# Patient Record
Sex: MALE | Race: BLACK OR AFRICAN AMERICAN | Employment: OTHER | URBAN - METROPOLITAN AREA
[De-identification: names, ages, dates, MRNs, and addresses within clinical notes are randomized per-mention and may not be internally consistent; named-entity substitution may affect disease eponyms.]

---

## 2017-05-11 ENCOUNTER — HOSPITAL ENCOUNTER (OUTPATIENT)
Age: 67
Discharge: HOME | End: 2017-05-11
Attending: INTERNAL MEDICINE
Payer: OTHER GOVERNMENT

## 2017-05-11 DIAGNOSIS — M25.562 PAIN IN LEFT KNEE: ICD-10-CM

## 2017-05-11 PROCEDURE — 73721 MRI JNT OF LWR EXTRE W/O DYE: CPT

## 2017-06-05 ENCOUNTER — HOSPITAL ENCOUNTER (OUTPATIENT)
Dept: PHYSICAL THERAPY | Age: 67
Discharge: HOME | End: 2017-06-05
Payer: OTHER GOVERNMENT

## 2017-06-05 PROCEDURE — 97163 PT EVAL HIGH COMPLEX 45 MIN: CPT

## 2017-06-05 PROCEDURE — 97530 THERAPEUTIC ACTIVITIES: CPT

## 2017-06-05 NOTE — PROGRESS NOTES
PT DAILY TREATMENT NOTE/LUMBAR EVAL 3-16    Patient Name: Angelique Salazar  Date:2017  : 1950  [x]  Patient  Verified  Payor:  / Plan: [facility]  RETIREES AND DEPENDENTS / Product Type:  /    In time:435  Out time:530  Total Treatment Time (min): 55  Total Timed Codes (min): 55  1:1 Treatment Time ( [address] only): 54   Visit #: 1 of 12    Treatment Area: Spondylolisthesis, lumbar region [M43.16]  SUBJECTIVE  Pain Level (0-10 scale): 7  [x]constant []intermittent []improving []worsening []no change since onset    Any medication changes, allergies to medications, adverse drug reactions, diagnosis change, or new procedure performed?: [x] No    [] Yes (see summary sheet for update)  Subjective functional status/changes: iChronic LB and LLE pain > 6 months, almost constant sharp pain going into left LE prema with walking and standing. Less symptoms in sitting position with minimal LE pain. Also reporting intermittent numbness/tingling from knee down to foot. Positive for nocturnal pain prema in left LE. Has been on Gabapentin since CS fusion 2010. Symptoms in LE are associated with subjective weakness around knee and lower leg. Reports dragging of right foot for years for > 5 years. Hx of fall in the yard in 2010 causing right hip contusion and onset of right LE dragging. Recent MRI of left knee shows small meniscal tear and arthritic changes. PLOF: Computer work.  Full function with occasional LBP, no sharp left LE pain  Limitations to PLOF: limited tolerance to ADL prema cutting grass, prolonged standing/walking  Mechanism of Injury: unsure about cause of injury  Current symptoms/Complaints: LB, left LE pain/weakness/paresthesia  Previous Treatment/Compliance: good, 2013 for LBP  PMHx/Surgical Hx: CS fusion   Work Hx: computer work  Living Situation: 2 level home, bedroom downstairs  Pt Goals: Get rid of the pain  Barriers: []pain []financial []time []transportation []other  Motivation: good  Substance use: []Alcohol []Tobacco []other:   FABQ Score: []low []elevate  Cognition: A & O x 3    Other:    OBJECTIVE/EXAMINATION  Domestic Life: WNL  Activity/Recreational Limitations: unable to do, played golf until 2010  Mobility: limited  Self Care:  WNL        Modality rationale: Pain control   Min Type Additional Details    [] Estim:  []Unatt       []IFC  []Premod                        []Other:  []w/ice   []w/heat  Position:  Location:    [] Estim: []Att    []TENS instruct  []NMES                    []Other:  []w/US   []w/ice   []w/heat  Position:  Location:    []  Traction: [] Cervical       []Lumbar                       [] Prone          []Supine                       []Intermittent   []Continuous Lbs:  [] before manual  [] after manual    []  Ultrasound: []Continuous   [] Pulsed                           []1MHz   []3MHz Location:  W/cm2:    []  Iontophoresis with dexamethasone         Location: [] Take home patch   [] In clinic    []  Ice     []  heat  []  Ice massage  []  Laser   []  Anodyne Position:  Location:    []  Laser with stim  []  Other: Position:  Location:    []  Vasopneumatic Device Pressure:       [] lo [] med [] hi   Temperature: [] lo [] med [] hi   [] Skin assessment post-treatment:  []intact []redness- no adverse reaction    []redness - adverse reaction:     15 min [x]Eval                  []Re-Eval           40 min Therapeutic Activity:  [x]  See flow sheet :instruction in HEP, POC, postural alignment activities, stretching and initiation of TERRELL for ES inhibition   Rationale: improved postural alignment                   With   [x] TE   [x] TA   [] neuro   [] other: Patient Education: [x] Review HEP    [] Progressed/Changed HEP based on:   [] positioning   [] body mechanics   [] transfers   [] heat/ice application    [] other:      Other Objective/Functional Measures: as per evaluation    Physical Therapy Evaluation - Lumbar Spine ([facility])    SUBJECTIVE  Chief Complaint:LB and left LE pain    Mechanism of injury:unsure, previous fall in yard in 2010    Symptoms:  Aggravated by:   [] Bending [] Sitting [x] Standing [x] Walking   [] Moving [] Cough [] Sneeze [] Valsalva   [x] AM  [] PM  Lying:  [x] sup   [] pro   [] sidelying   [] Other:stiffness     Eased by:    [] Bending [] Sitting [] Standing [] Walking   [] Moving [] AM  [] PM  Lying: [] sup  [] pro  [x] sidelying   [] Other:using stretching strap     General Health:  Red Flags Indicated? [] Yes    [x] No  [] Yes [] No Recent weight change (If yes, due to dieting?  [] Yes  [] No)   [] Yes [] No Weakness in legs during walking  [] Yes [] No Unremitting pain at night  [] Yes [] No Abdominal pain or problems  [] Yes [] No Rectal bleeding  [] Yes [] No Feet more cold or painful in cold weather  [] Yes [] No Menstrual irregularities  [] Yes [] No Blood or pain with urination  [] Yes [] No Dysfunction of bowel or bladder  [] Yes [] No Recent illness within past 3 weeks (i.e, cold, flu)  [] Yes [] No Numbness/tingling in buttock/genitalia region    Past History/Treatments:     Diagnostic Tests: [] Lab work [] X-rays    [] CT [x] MRI     [] Other:  Results:neg LB, pos left knee     Functional Status  Prior level of function:full function with intermittent LBP  Present functional limitations:limited standing/walking tolerance  What position do you sleep in?:SL     OBJECTIVE  Posture:scoliosis, right lower posterior rib cage prominent  Lateral Shift: [] R    [x] L     [x] +  [] -  Kyphosis: [] Increased [x] Decreased   []  WNL  Lordosis:  [] Increased [x] Decreased   [] WNL  Pelvic symmetry: [] WNL    [x] Other:marked asymmetries throughout trunk, spine, right pelvis elevated    Gait:  [] Normal     [] Abnormal:    Active Movements: [] N/A   [] Too acute   [] Other:  ROM % AROM % PROM Comments:pain, area   Forward flexion 40-60 8\"     Extension 20-30 limited  pain   SB right 20-30 19\"  pain   SB left 20-30 18\"  pain   Rotation right 5-10 Rotation left 5-10        Repeated Movements   Effects on present pain: produces (KY), abolishes (A), increases (incr), decreases (decr), centralizes (C), peripheral (PH), no effect (NE)   Pre-Test Sx Flexion Repeated Flexion Extension Repeated Extension Repeated SBL Repeated SBR   Sitting          Standing          Lying      N/A N/A   Comments:  Side Glide:  Sustained passive positioning test:    Neuro Screen [x] WNL  Myotome/Dermatome/Reflexes:  Comments:    Dural Mobility:  SLR Sitting: [x] R    [x] L    [] +    [x] -  @ (degrees):           Supine: [x] R    [x] L    [] +    [x] -  @ (degrees): limited SLR left 40, right 30 due to immense tightness  Slump Test: [] R    [] L    [] +    [] -  @ (degrees):   Prone Knee Bend: [x] R    [] L    [x] +    [] - , left 90, right 60  Palpation  [] Min  [x] Mod  [] Severe    Location:5/S1L  [] Min  [] Mod  [] Severe    Location:  [] Min  [] Mod  [] Severe    Location:    Strength   L(0-5) R (0-5) N/T   Hip Flexion (L1,2)   []   Knee Extension (L3,4)   []   Ankle Dorsiflexion (L4)   []   Great Toe Extension (L5)   []   Ankle Plantarflexion (S1)   []   Knee Flexion (S1,2)   []   Upper Abdominals   []   Lower Abdominals   []   Paraspinals   []   Back Rotators   []   Gluteus Robbi 3 2 []   Other   []     Special Tests  Lumbar:  Lumb.  Compression: [] Pos  [] Neg               Lumbar Distraction:   [] Pos  [] Neg    Quadrant:  [] Pos  [] Neg   [] Flex  [] Ext    Sacroilliac:  Gaenslen's: [] R    [] L    [] +    [] -     Compression: [] +    [] -     Gapping:  [] +    [] -     Thigh Thrust: [] R    [] L    [] +    [] -     Leg Length: [] +    [] -   Position:    Crests:    ASIS:    PSIS:    Sacral Sulcus:    Mobility: Standing flex:     Sitting flex:     Supine to sit:     Prone knee bend:         Hip: Huel Jen:  [] R    [] L    [] +    [] -     Scour:  [] R    [] L    [] +    [] -     Piriformis: [x] R    [x] L    [x] +    [] -          Deficits: Edis's: [x] R    [x] L    [x] + [] -     [place]: [x] R    [x] L    [x] +    [] -     Hamstrings 90/90: right 40, left 50    Gastrocsoleus (to neutral): Right: Left:       Global Muscular Weakness:  Abdominals:4/5  Quadratus Lumborum:nt  Paraspinals:hypertonicity  Other:    Other tests/comments:       Pain Level (0-10 scale) post treatment: 0/10 in 90/90 position, 2/10 stance    ASSESSMENT/Changes in Function: relaxation in proper position    Patient will continue to benefit from skilled PT services to address ROM deficits, address strength deficits, analyze and address soft tissue restrictions, analyze and cue movement patterns, analyze and modify body mechanics/ergonomics and assess and modify postural abnormalities to attain remaining goals.      [x]  See Plan of Care  []  See progress note/recertification  []  See Discharge Summary         Progress towards goals / Updated goals:  Good understanding of initial HEP    PLAN  []  Upgrade activities as tolerated     [x]  Continue plan of care  []  Update interventions per flow sheet       []  Discharge due to:_  []  Other:_      Jordan Santiago PT 6/5/2017  4:25 PM

## 2017-06-05 NOTE — PROGRESS NOTES
[facility] in [facility]. Courtney Sanchez, [address] [place]  Phone: [phone]      Fax:  [phone] of Care/ Statement of Necessity for Physical Therapy Services    Patient name: Rosalind Woodall Start of Care: 2017   Referral source: Cary Clemens : 1950    Medical Diagnosis: Spondylolisthesis, lumbar region [M43.16]   Onset Date:chronic with recent exacerbation    Treatment Diagnosis: LBP, scoliosis, postural alignment deficit   Prior Hospitalization: see medical history Provider#: [id number]   Medications: Verified on Patient summary List    Comorbidities: CS fusion, right hip dysfunction   Prior Level of Function: Full function with intermittent pain      The Plan of Care and following information is based on the information from the initial evaluation. Assessment/ key information: 61 YOM with reports of recent progressive LB and  LE pain affecting his walking ability with pain ranging from 5-10/10 depending on activity. Objective findings include marked deficit in right hip, left shoulder and spinal mobility, flexibility deficits prema in hamstring musculature,spinal scoliosis and alignment deficits including elevated right pelvis/LLD right >left, muscle imbalances and strength deficit in proximal hip prema on right, altered gait pattern with increased LLE WB and slight dragging of right LE due to hip hypomobility. Patient is highly motivated and a good candidate for skilled PT. Evaluation Complexity History HIGH Complexity :3+ comorbidities / personal factors will impact the outcome/ POC ; Examination HIGH Complexity : 4+ Standardized tests and measures addressing body structure, function, activity limitation and / or participation in recreation  ;Presentation MEDIUM Complexity : Evolving with changing characteristics  ; Clinical Decision Making MEDIUM Complexity : FOTO score of 26-74  Overall Complexity Rating: MEDIUM  Problem List: pain affecting function, decrease ROM, decrease strength, impaired gait/ balance, decrease activity tolerance and decrease flexibility/ joint mobility   Treatment Plan may include any combination of the following: Therapeutic exercise, Therapeutic activities, Neuromuscular re-education, Physical agent/modality, Manual therapy and Patient education  Patient / Family readiness to learn indicated by: asking questions and trying to perform skills  Persons(s) to be included in education: patient (P)  Barriers to Learning/Limitations: None  Patient Goal (s): Be able to function better, less pain  Patient Self Reported Health Status: good  Rehabilitation Potential: good    Short Term Goals: To be accomplished in 3 weeks:   1. Improved postural alignment and awareness to allow self correction during ADL  Status at [facility]: postural training initiated    2. Patient reports reduction in pain to <or= 5/10 with ADL and ambulatory activities  Status at [facility]: pain ranging from 5-10/10    Long Term Goals: To be accomplished in 6 weeks:   1. Improved FOTO score to >or= 65/100 as evidence of improved function with less pain  Status at [facility]: FOTO 56/100    2. Improved proximal hip strength bilaterally to >or= 4-/5 for improved gait pattern and increased ease with dynamic ADL  Status at [facility]: Glut max MMT right 2/5, left 3/5    3. Improved SLR to >or= 60 deg bilaterally and Left =Right flexibility for improved trunk neutrality and increased ease with stooping and dynamic activities  Status at [facility]: SLR left 40, right 30 deg    4. Improved spinal mobility and ability to reach past mid shin for increased ease with all ADL  Status at [facility]: marked HS flexibility deficit and FFD 8 inches with spinal FLex in standing    5. Patient reports reduction in LB and LE pain to <or= 3/10 with ADL and community ambulation  Status at [facility]: pain ranging from 5-10/10    Frequency / Duration: Patient to be seen 2 times per week for 6 weeks.     Patient/ Caregiver education and instruction: Diagnosis, prognosis, activity modification and exercises   [x]  Plan of care has been reviewed with PTA          The severity rating is based on clinical judgment and the FOTO score. Certification Period: 6/5/17-8/4/17  Jessica Chin, PT 6/5/2017 4:24 PM  _____________________________________________________________________  I certify that the above Therapy Services are being furnished while the patient is under my care. I agree with the treatment plan and certify that this therapy is necessary. Physician's Signature:____________________  Date:__________Time:______    Please sign and return to   [facility] in [address]  [place] [address]     Phone: [phone]      Fax:  [phone]

## 2017-06-08 ENCOUNTER — HOSPITAL ENCOUNTER (OUTPATIENT)
Dept: PHYSICAL THERAPY | Age: 67
Discharge: HOME | End: 2017-06-08
Payer: OTHER GOVERNMENT

## 2017-06-08 PROCEDURE — 97110 THERAPEUTIC EXERCISES: CPT

## 2017-06-08 PROCEDURE — 97530 THERAPEUTIC ACTIVITIES: CPT

## 2017-06-08 NOTE — PROGRESS NOTES
PT DAILY TREATMENT NOTE     Patient Name: Luis Peng  Date:2017  : 1950  [x]  Patient  Verified  Payor:  / Plan: [facility]  RETIREES AND DEPENDENTS / Product Type: [employer] /    In time:355  Out time:435  Total Treatment Time (min): 40  Visit #: 2 of 12    Treatment Area: Spondylolisthesis, lumbar region [M43.16]    SUBJECTIVE  Pain Level (0-10 scale): left knee pain 6/10, LB stiffness with ambulation 6-7/10  Any medication changes, allergies to medications, adverse drug reactions, diagnosis change, or new procedure performed?: [x] No    [] Yes (see summary sheet for update)  Subjective functional status/changes:   [] No changes reported  When I am sitting down the pain is gone    OBJECTIVE    Modality rationale: Reduction of muscle guarding, pain   Min Type Additional Details    [] Estim:  []Unatt       []IFC  []Premod                        []Other:  []w/ice   []w/heat  Position:  Location:    [] Estim: []Att    []TENS instruct  []NMES                    []Other:  []w/US   []w/ice   []w/heat  Position:  Location:    []  Traction: [] Cervical       []Lumbar                       [] Prone          []Supine                       []Intermittent   []Continuous Lbs:  [] before manual  [] after manual    []  Ultrasound: []Continuous   [] Pulsed                           []1MHz   []3MHz W/cm2:  Location:    []  Iontophoresis with dexamethasone         Location: [] Take home patch   [] In clinic   10 []  Ice     [x]  heat  []  Ice massage  []  Laser   []  Anodyne Position:90/90  Location:LB    []  Laser with stim  []  Other:  Position:  Location:    []  Vasopneumatic Device Pressure:       [] lo [] med [] hi   Temperature: [] lo [] med [] hi   [] Skin assessment post-treatment:  []intact []redness- no adverse reaction    []redness - adverse reaction:      min []Eval                  []Re-Eval       25 min Therapeutic Exercise:  [x] See flow sheet :stretching, ROM   Rationale: increase ROM, increase strength, improve coordination and improve balance     15 min Therapeutic Activity:  [x]  See flow sheet :TERRELL for better postural alignment, more effective breathing pattern and trunk neutrality   Rationale: increase ROM and increase strength  to improve the patients ability to function with more neutral alignment      min Neuromuscular Re-education:  []  See flow sheet :        min Manual Therapy:                   With   [x] TE   [x] TA   [] neuro   [] other: Patient Education: [x] Review HEP    [] Progressed/Changed HEP based on:   [] positioning   [] body mechanics   [] transfers   [] heat/ice application    [] other:      Other Objective/Functional Measures:   Difficulty initially with balloon breathing  Needs frequent verbal cues for proper form with TE     Pain Level (0-10 scale) post treatment: 0 no pain during TE/TA    ASSESSMENT/Changes in Function: shallow breathing, overcompensation of CS     Patient will continue to benefit from skilled PT services to address functional mobility deficits, address ROM deficits, address strength deficits, analyze and address soft tissue restrictions, analyze and cue movement patterns and assess and modify postural abnormalities to attain remaining goals.      [x]  See Plan of Care  []  See progress note/recertification  []  See Discharge Summary         Progress towards goals / Updated goals:  Challenged with TE/TA    PLAN  []  Upgrade activities as tolerated     [x]  Continue plan of care  []  Update interventions per flow sheet       []  Discharge due to:_  []  Other:_      Raheem Sweet PT 6/8/2017  3:55 PM    Future Appointments  Date Time Provider Shilo Anderson   6/8/2017 4:00 PM GRAYSON VerasHPRHONDA THE [facility]   6/12/2017 4:30 PM Kanwal PONCEHPISHAAND THE [facility]   6/15/2017 4:30 PM Heather Gottlieb PTA MI[facility]PTD THE [facility]   6/19/2017 4:00 PM GRAYSON WaiteHPRHONDA THE [facility]   6/21/2017 4:00 PM Anette Altamease Goodpasture, PT [facility]   6/26/2017 4:00 PM GRAYSON WaiteHPRHONDA THE [facility]   6/28/2017 2:45 PM MD Jacky Aaron   6/29/2017 4:00 PM GRAYSON Bajwa THE [facility]

## 2017-06-12 ENCOUNTER — HOSPITAL ENCOUNTER (OUTPATIENT)
Dept: PHYSICAL THERAPY | Age: 67
Discharge: HOME | End: 2017-06-12
Payer: OTHER GOVERNMENT

## 2017-06-12 PROCEDURE — 97112 NEUROMUSCULAR REEDUCATION: CPT

## 2017-06-12 PROCEDURE — 97110 THERAPEUTIC EXERCISES: CPT

## 2017-06-12 PROCEDURE — 97530 THERAPEUTIC ACTIVITIES: CPT

## 2017-06-12 NOTE — PROGRESS NOTES
PT DAILY TREATMENT NOTE     Patient Name: Darrell Vilchis  Date:2017  : 1950  [x]  Patient  Verified  Payor:  / Plan: [facility]  RETIREES AND DEPENDENTS / Product Type: [employer] /    In time:425  Out time:515  Total Treatment Time (min): 50  Visit #: 3 of 12    Treatment Area: Spondylolisthesis, lumbar region [M43.16]    SUBJECTIVE  Pain Level (0-10 scale): LB 4/10  Any medication changes, allergies to medications, adverse drug reactions, diagnosis change, or new procedure performed?: [x] No    [] Yes (see summary sheet for update)  Subjective functional status/changes:   [] No changes reported  Less tightness and pain after last session    OBJECTIVE    Modality rationale: Reduction of muscle guarding, pain   Min Type Additional Details    [] Estim:  []Unatt       []IFC  []Premod                        []Other:  []w/ice   []w/heat  Position:  Location:    [] Estim: []Att    []TENS instruct  []NMES                    []Other:  []w/US   []w/ice   []w/heat  Position:  Location:    []  Traction: [] Cervical       []Lumbar                       [] Prone          []Supine                       []Intermittent   []Continuous Lbs:  [] before manual  [] after manual    []  Ultrasound: []Continuous   [] Pulsed                           []1MHz   []3MHz W/cm2:  Location:    []  Iontophoresis with dexamethasone         Location: [] Take home patch   [] In clinic   10 []  Ice     [x]  heat  []  Ice massage  []  Laser   []  Anodyne Position:90/90  Location:LB    []  Laser with stim  []  Other:  Position:  Location:    []  Vasopneumatic Device Pressure:       [] lo [] med [] hi   Temperature: [] lo [] med [] hi   [] Skin assessment post-treatment:  []intact []redness- no adverse reaction    []redness - adverse reaction:      min []Eval                  []Re-Eval       15 min Therapeutic Exercise:  [x] See flow sheet :stretching, ROM   Rationale: increase ROM, increase strength, improve coordination and improve balance     25 min Therapeutic Activity:  [x]  See flow sheet :TERRELL for better postural alignment, more effective breathing pattern and trunk neutrality, review of balloon breathing, focus on hip shift   Rationale: increase ROM and increase strength  to improve the patients ability to function with more neutral alignment      min Neuromuscular Re-education:  []  See flow sheet :        min Manual Therapy:                   With   [x] TE   [x] TA   [] neuro   [] other: Patient Education: [x] Review HEP    [] Progressed/Changed HEP based on:   [] positioning   [] body mechanics   [] transfers   [] heat/ice application    [] other:      Other Objective/Functional Measures:   Improved performance with TERRELL ex     Pain Level (0-10 scale) post treatment: 0 no pain during TE/TA    ASSESSMENT/Changes in Function: shallow breathing, overcompensation of CS     Patient will continue to benefit from skilled PT services to address functional mobility deficits, address ROM deficits, address strength deficits, analyze and address soft tissue restrictions, analyze and cue movement patterns and assess and modify postural abnormalities to attain remaining goals. [x]  See Plan of Care  []  See progress note/recertification  []  See Discharge Summary         Progress towards goals / Updated goals:  Challenged with TE/TA  Short Term Goals: To be accomplished in 3 weeks:  1. Improved postural alignment and awareness to allow self correction during ADL  Status at Eval: postural training initiated     2. Patient reports reduction in pain to <or= 5/10 with ADL and ambulatory activities  Status at Eval: pain ranging from 5-10/10     Long Term Goals: To be accomplished in 6 weeks:  1. Improved FOTO score to >or= 65/100 as evidence of improved function with less pain  Status at Eval: FOTO 56/100     2.  Improved proximal hip strength bilaterally to >or= 4-/5 for improved gait pattern and increased ease with dynamic ADL  Status at Eval: Glut max MMT right 2/5, left 3/5     3. Improved SLR to >or= 60 deg bilaterally and Left =Right flexibility for improved trunk neutrality and increased ease with stooping and dynamic activities  Status at Eval: SLR left 40, right 30 deg     4. Improved spinal mobility and ability to reach past mid shin for increased ease with all ADL  Status at Eval: marked HS flexibility deficit and FFD 8 inches with spinal FLex in standing     5.  Patient reports reduction in LB and LE pain to <or= 3/10 with ADL and community ambulation  Status at Eval: pain ranging from 5-10/10  PLAN  []  Upgrade activities as tolerated     [x]  Continue plan of care  []  Update interventions per flow sheet       []  Discharge due to:_  []  Other:_      Raheem Sweet PT 6/12/2017  3:55 PM    Future Appointments  Date Time Provider Shilo Anderson   6/15/2017 4:30 PM Heather Gottlieb PTA [facility]   6/19/2017 4:00 PM Elizabet Roe, GRAYSON [facility]   6/21/2017 4:00 PM Anette Altamease Goodpasture, PT MIHPTNAIMA [facility]   6/26/2017 4:00 PM Elizabet Roe, GRAYSON [facility]   6/28/2017 2:45 PM MD Jacky Hodge   6/29/2017 4:00 PM GRAYSON VerasHPRHONDA THE [facility]

## 2017-06-15 ENCOUNTER — HOSPITAL ENCOUNTER (OUTPATIENT)
Dept: PHYSICAL THERAPY | Age: 67
Discharge: HOME | End: 2017-06-15
Payer: OTHER GOVERNMENT

## 2017-06-15 NOTE — PROGRESS NOTES
PT DAILY TREATMENT NOTE - [facility]     Patient Name: Kyra Smallwood  Date:6/15/2017  : 1950  [x]  Patient  Verified  Payor: ONEIL / Plan: [facility]  RETIREES AND DEPENDENTS / Product Type: Larri Creed /    In time:420  Out time:500  Total Treatment Time (min): 40  Total Timed Codes (min): 40  1:1 Treatment Time ([address] only): na   Visit #: 4 of 12    Treatment Area: Spondylolisthesis, lumbar region [M43.16]    SUBJECTIVE  Pain Level (0-10 scale): 5-6  Any medication changes, allergies to medications, adverse drug reactions, diagnosis change, or new procedure performed?: [x] No    [] Yes (see summary sheet for update)  Subjective functional status/changes:     [x] No changes reported      OBJECTIVE    40 min Therapeutic Exercise:  [] See flow sheet :   Rationale: increase ROM, increase strength and improve coordination              With   [] TE   [] TA   [] neuro   [] other: Patient Education: [x] Review HEP    [] Progressed/Changed HEP based on:   [] positioning   [] body mechanics   [] transfers   [] heat/ice application    [] other:      Other Objective/Functional Measures:   none     Pain Level (0-10 scale) post treatment: 0    ASSESSMENT/Changes in Function:     Slightly limited with QP activities due to deficits in UE strength    Patient will continue to benefit from skilled PT services to modify and progress therapeutic interventions, address functional mobility deficits, address ROM deficits and address strength deficits to attain remaining goals. [x]  See Plan of Care  []  See progress note/recertification  []  See Discharge Summary         Progress towards goals / Updated goals:  Challenged with TE/TA  Short Term Goals: To be accomplished in 3 weeks:  1. Improved postural alignment and awareness to allow self correction during ADL  Status at [facility]: postural training initiated      2.  Patient reports reduction in pain to <or= 5/10 with ADL and ambulatory activities  Status at [facility]: pain ranging from 5-10/10      Long Term Goals: To be accomplished in 6 weeks:  1. Improved FOTO score to >or= 65/100 as evidence of improved function with less pain  Status at Eval: FOTO 56/100      2. Improved proximal hip strength bilaterally to >or= 4-/5 for improved gait pattern and increased ease with dynamic ADL  Status at Eval: Glut max MMT right 2/5, left 3/5      3. Improved SLR to >or= 60 deg bilaterally and Left =Right flexibility for improved trunk neutrality and increased ease with stooping and dynamic activities  Status at Eval: SLR left 40, right 30 deg      4. Improved spinal mobility and ability to reach past mid shin for increased ease with all ADL  Status at Eval: marked HS flexibility deficit and FFD 8 inches with spinal FLex in standing      5.  Patient reports reduction in LB and LE pain to <or= 3/10 with ADL and community ambulation  Status at Eval: pain ranging from 5-10/10    PLAN  [x]  Upgrade activities as tolerated     [x]  Continue plan of care  []  Update interventions per flow sheet       []  Discharge due to:_  []  Other:_      Moe Palacios, ADEN 6/15/2017  5:32 PM    Future Appointments  Date Time Provider Shilo Anderson   6/19/2017 4:00 PM GRAYSON Adams [facility]   6/21/2017 4:00 PM Kanwal CLARK [facility]   6/26/2017 4:00 PM GRAYSON Adams [facility]   6/28/2017 2:45 PM MD Jacky Dorado   6/29/2017 4:00 PM GRAYSON Chan THE [facility]

## 2017-06-19 ENCOUNTER — HOSPITAL ENCOUNTER (OUTPATIENT)
Dept: PHYSICAL THERAPY | Age: 67
Discharge: HOME | End: 2017-06-19
Payer: OTHER GOVERNMENT

## 2017-06-19 PROCEDURE — 97140 MANUAL THERAPY 1/> REGIONS: CPT

## 2017-06-19 PROCEDURE — 97112 NEUROMUSCULAR REEDUCATION: CPT

## 2017-06-19 NOTE — PROGRESS NOTES
PT DAILY TREATMENT NOTE - [facility]     Patient Name: Naomie Sloan  Date:2017  : 1950  [x]  Patient  Verified  Payor:  / Plan: [facility]  RETIREES AND DEPENDENTS / Product Type:  /    In time:4:00  Out time:5:00  Total Treatment Time (min): 60  Visit #: 5 of 12    Treatment Area: Spondylolisthesis, lumbar region [M43.16]    SUBJECTIVE  Pain Level (0-10 scale): 6/10  Any medication changes, allergies to medications, adverse drug reactions, diagnosis change, or new procedure performed?: [x] No    [] Yes (see summary sheet for update)  Subjective functional status/changes:   [x] No changes reported    OBJECTIVE    Modality rationale:    Min Type Additional Details    [] Estim:  []Unatt       []IFC  []Premod                        []Other:  []w/ice   []w/heat  Position:  Location:    [] Estim: []Att    []TENS instruct  []NMES                    []Other:  []w/US   []w/ice   []w/heat  Position:  Location:    []  Traction: [] Cervical       []Lumbar                       [] Prone          []Supine                       []Intermittent   []Continuous Lbs:  [] before manual  [] after manual    []  Ultrasound: []Continuous   [] Pulsed                           []1MHz   []3MHz W/cm2:  Location:    []  Iontophoresis with dexamethasone         Location: [] Take home patch   [] In clinic    []  Ice     []  heat  []  Ice massage  []  Laser   []  Anodyne Position:  Location:    []  Laser with stim  []  Other:  Position:  Location:    []  Vasopneumatic Device Pressure:       [] lo [] med [] hi   Temperature: [] lo [] med [] hi   [] Skin assessment post-treatment:  []intact []redness- no adverse reaction    []redness - adverse reaction:      min []Eval                  []Re-Eval        min Therapeutic Exercise:  [] See flow sheet :        min Therapeutic Activity:  []  See flow sheet :        45 min Neuromuscular Re-education:  [x]  See flow sheet :  Added bridges, glutes set, prone heel presses, transverse abdominus (TA) bracing with Swissball #1, #2, #3   Rationale: increase strength, improve coordination and increase proprioception  to improve the patients ability to tolerate positions and maintain innominate alignment. 15 min Manual Therapy:    METs to correct posteriorly rotated right innominate   Rationale: decrease pain and correct joint alignment and joint mechanics to tolerate positions and improve gait. min Gait Training:  ___ feet with ___ device on level surfaces with ___ level of assist   Rationale: With   [] TE   [] TA   [] neuro   [] other: Patient Education: [x] Review HEP    [] Progressed/Changed HEP based on:   [] positioning   [] body mechanics   [] transfers   [] heat/ice application    [] other:      Other Objective/Functional Measures:   right LE shorter in supine then right LE lengthens in sitting- posteriorly rotated right innominate  right standing flexion test    Pain Level (0-10 scale) post treatment: 3/10    ASSESSMENT/Changes in Function:    Posteriorly rotated right innominate was successfully corrected today using METs resulting in sensation of symmetric gait and reduction of hip pain. Pt able to maintain good TA contraction for several repetitions. Patient will continue to benefit from skilled PT services to modify and progress therapeutic interventions, address functional mobility deficits, address ROM deficits and address strength deficits to attain remaining goals. []  See Plan of Care  []  See progress note/recertification  []  See Discharge Summary         Progress towards goals / Updated goals:  Short Term Goals: To be accomplished in 3 weeks:  1. Improved postural alignment and awareness to allow self correction during ADL  Status at Eval: postural training initiated  Current status: not reassessed      2.  Patient reports reduction in pain to <or= 5/10 with ADL and ambulatory activities  Status at [facility]: pain ranging from 5-10/10  Current status: not reassessed      Long Term Goals: To be accomplished in 6 weeks:  1. Improved FOTO score to >or= 65/100 as evidence of improved function with less pain  Status at Eval: FOTO 56/100  Current status: not reassessed      2. Improved proximal hip strength bilaterally to >or= 4-/5 for improved gait pattern and increased ease with dynamic ADL  Status at Eval: Glut max MMT right 2/5, left 3/5  Current status: not reassessed      3. Improved SLR to >or= 60 deg bilaterally and Left =Right flexibility for improved trunk neutrality and increased ease with stooping and dynamic activities  Status at Eval: SLR left 40, right 30 deg  Current status: not reassessed      4. Improved spinal mobility and ability to reach past mid shin for increased ease with all ADL  Status at Eval: marked HS flexibility deficit and FFD 8 inches with spinal FLex in standing  Current status: not reassessed      5.  Patient reports reduction in LB and LE pain to <or= 3/10 with ADL and community ambulation  Status at Eval: pain ranging from 5-10/10  Current status: not reassessed    PLAN  []  Upgrade activities as tolerated     [x]  Continue plan of care  []  Update interventions per flow sheet       []  Discharge due to:_  []  Other:_      Ines Iyer PT 6/19/2017  4:06 PM    Future Appointments  Date Time Provider Shilo Anderson   6/21/2017 4:00 PM Caron Bergeron [place] AMBER [facility]   6/26/2017 4:00 PM GRAYSON Ovalles [facility]   6/28/2017 2:45 PM MD Jacky Ricardo   6/29/2017 4:00 PM GRAYSON Samayoa [facility]

## 2017-06-21 ENCOUNTER — HOSPITAL ENCOUNTER (OUTPATIENT)
Dept: PHYSICAL THERAPY | Age: 67
Discharge: HOME | End: 2017-06-21
Payer: OTHER GOVERNMENT

## 2017-06-21 PROCEDURE — 97112 NEUROMUSCULAR REEDUCATION: CPT

## 2017-06-21 PROCEDURE — 97140 MANUAL THERAPY 1/> REGIONS: CPT

## 2017-06-23 NOTE — PROGRESS NOTES
PT DAILY TREATMENT NOTE - [facility]     Patient Name: Cassius Valenzuela  Date:2017  : 1950  [x]  Patient  Verified  Payor:  / Plan: [facility]  RETIREES AND DEPENDENTS / Product Type:  /    In time:4:00  Out time:440  Total Treatment Time (min): 40  Visit #: 6 of 12    Treatment Area: Spondylolisthesis, lumbar region [M43.16]    SUBJECTIVE  Pain Level (0-10 scale): 6/10  Any medication changes, allergies to medications, adverse drug reactions, diagnosis change, or new procedure performed?: [x] No    [] Yes (see summary sheet for update)  Subjective functional status/changes:   [x] No changes reported  I am feeling better but of course there is still some pain    OBJECTIVE    Modality rationale:    Min Type Additional Details    [] Estim:  []Unatt       []IFC  []Premod                        []Other:  []w/ice   []w/heat  Position:  Location:    [] Estim: []Att    []TENS instruct  []NMES                    []Other:  []w/US   []w/ice   []w/heat  Position:  Location:    []  Traction: [] Cervical       []Lumbar                       [] Prone          []Supine                       []Intermittent   []Continuous Lbs:  [] before manual  [] after manual    []  Ultrasound: []Continuous   [] Pulsed                           []1MHz   []3MHz W/cm2:  Location:    []  Iontophoresis with dexamethasone         Location: [] Take home patch   [] In clinic    []  Ice     []  heat  []  Ice massage  []  Laser   []  Anodyne Position:  Location:    []  Laser with stim  []  Other:  Position:  Location:    []  Vasopneumatic Device Pressure:       [] lo [] med [] hi   Temperature: [] lo [] med [] hi   [] Skin assessment post-treatment:  []intact []redness- no adverse reaction    []redness - adverse reaction:      min []Eval                  []Re-Eval        min Therapeutic Exercise:  [] See flow sheet :        min Therapeutic Activity:  []  See flow sheet :        25 min Neuromuscular Re-education:  [x]  See flow sheet :  Postural alignment, TERRELL activities, core activities   Rationale: increase strength, improve coordination and increase proprioception  to improve the patients ability to tolerate positions and maintain innominate alignment. 15 min Manual Therapy:    Mobilization right hip, manual longitudinal distraction with belt   Rationale: decrease pain and correct joint alignment and joint mechanics to tolerate positions and improve gait. min Gait Training:  ___ feet with ___ device on level surfaces with ___ level of assist   Rationale: With   [] TE   [] TA   [] neuro   [] other: Patient Education: [x] Review HEP    [] Progressed/Changed HEP based on:   [] positioning   [] body mechanics   [] transfers   [] heat/ice application    [] other:      Other Objective/Functional Measures:   Right hip mobility improving    Pain Level (0-10 scale) post treatment: LS 0/10, left knee 4/10    ASSESSMENT/Changes in Function:    Posteriorly rotated right innominate was successfully corrected today using METs resulting in sensation of symmetric gait and reduction of hip pain. Pt able to maintain good TA contraction for several repetitions. Patient will continue to benefit from skilled PT services to modify and progress therapeutic interventions, address functional mobility deficits, address ROM deficits and address strength deficits to attain remaining goals. [x]  See Plan of Care  []  See progress note/recertification  []  See Discharge Summary         Progress towards goals / Updated goals:  Short Term Goals: To be accomplished in 3 weeks:  1. Improved postural alignment and awareness to allow self correction during ADL  Status at [facility]: postural training initiated  Current status: not reassessed      2.  Patient reports reduction in pain to <or= 5/10 with ADL and ambulatory activities  Status at [facility]: pain ranging from 5-10/10  Current status:overall less subjective pain, progressing      Long Term Goals: To be accomplished in 6 weeks:  1. Improved FOTO score to >or= 65/100 as evidence of improved function with less pain  Status at Eval: FOTO 56/100  Current status: 50/100, regressed      2. Improved proximal hip strength bilaterally to >or= 4-/5 for improved gait pattern and increased ease with dynamic ADL  Status at Eval: Glut max MMT right 2/5, left 3/5  Current status: not reassessed      3. Improved SLR to >or= 60 deg bilaterally and Left =Right flexibility for improved trunk neutrality and increased ease with stooping and dynamic activities  Status at Eval: SLR left 40, right 30 deg  Current status: not reassessed      4. Improved spinal mobility and ability to reach past mid shin for increased ease with all ADL  Status at Eval: marked HS flexibility deficit and FFD 8 inches with spinal FLex in standing  Current status: not reassessed      5.  Patient reports reduction in LB and LE pain to <or= 3/10 with ADL and community ambulation  Status at Eval: pain ranging from 5-10/10  Current status: not reassessed    PLAN  []  Upgrade activities as tolerated     [x]  Continue plan of care  []  Update interventions per flow sheet       []  Discharge due to:_  []  Other:_      Holli Greene PT 6/23/2017  4:06 PM    Future Appointments  Date Time Provider Shilo Anderson   6/26/2017 4:00 PM Simona Luke PT [facility]   6/28/2017 2:45 PM MD Jacky Aaron   6/29/2017 4:00 PM Holli Greene PT [facility]   7/5/2017 4:00 PM Holli Actraffy PT [facility]   7/6/2017 4:00 PM Holli Actraffy PT [facility]   7/12/2017 4:00 PM Holli Greene PT [facility]   7/13/2017 4:00 PM Holli Actraffy PT [facility]   7/18/2017 4:00 PM Simona Luke PT [facility]   7/20/2017 4:00 PM Kanwal Sim [facility]   7/25/2017 4:00 PM GRAYSON Contreras [facility]   7/27/2017 4:00 PM Kanwal Sim, PT [facility]

## 2017-06-26 ENCOUNTER — HOSPITAL ENCOUNTER (OUTPATIENT)
Dept: PHYSICAL THERAPY | Age: 67
Discharge: HOME | End: 2017-06-26
Payer: OTHER GOVERNMENT

## 2017-06-26 PROCEDURE — 97164 PT RE-EVAL EST PLAN CARE: CPT

## 2017-06-26 PROCEDURE — 97112 NEUROMUSCULAR REEDUCATION: CPT

## 2017-06-26 PROCEDURE — 97140 MANUAL THERAPY 1/> REGIONS: CPT

## 2017-06-26 NOTE — PROGRESS NOTES
PT DAILY TREATMENT NOTE     Patient Name: Doris Patino  Date:2017  : 1950  [x]  Patient  Verified  Payor:  / Plan: [facility]  RETIREES AND DEPENDENTS / Product Type: Annrommel Jj /    In time:3:54  Out time:4:43  Total Treatment Time (min): 49  Visit #: 7 of 12    Treatment Area: Spondylolisthesis, lumbar region [M43.16]    SUBJECTIVE  Pain Level (0-10 scale): 5/10  Left LE, 2/10 back  Any medication changes, allergies to medications, adverse drug reactions, diagnosis change, or new procedure performed?: [x] No    [] Yes (see summary sheet for update)  Subjective functional status/changes:   [] No changes reported  \"Pain 7/10 at worst usually in the morning. \"     OBJECTIVE    Modality rationale:    Min Type Additional Details    [] Estim:  []Unatt       []IFC  []Premod                        []Other:  []w/ice   []w/heat  Position:  Location:    [] Estim: []Att    []TENS instruct  []NMES                    []Other:  []w/US   []w/ice   []w/heat  Position:  Location:    []  Traction: [] Cervical       []Lumbar                       [] Prone          []Supine                       []Intermittent   []Continuous Lbs:  [] before manual  [] after manual    []  Ultrasound: []Continuous   [] Pulsed                           []1MHz   []3MHz W/cm2:  Location:    []  Iontophoresis with dexamethasone         Location: [] Take home patch   [] In clinic    []  Ice     []  heat  []  Ice massage  []  Laser   []  Anodyne Position:  Location:    []  Laser with stim  []  Other:  Position:  Location:    []  Vasopneumatic Device Pressure:       [] lo [] med [] hi   Temperature: [] lo [] med [] hi   [] Skin assessment post-treatment:  []intact []redness- no adverse reaction    []redness - adverse reaction:     15 min []Eval                  [x]Re-Eval        min Therapeutic Exercise:  [] See flow sheet :        min Therapeutic Activity:  []  See flow sheet :        19 min Neuromuscular Re-education:  [] See flow sheet :   Rationale: increase strength, improve coordination and increase proprioception  to improve the patients ability to tolerate positions and maintain innominate alignment. 15 min Manual Therapy:    METs to coirrect anteriorly rotated right innominate and right innominate upslip   Rationale: decrease pain and correct joint alignment and joint mechanics to tolerate positions and improve gait. min Gait Training:  ___ feet with ___ device on level surfaces with ___ level of assist   Rationale: With   [] TE   [] TA   [] neuro   [] other: Patient Education: [x] Review HEP    [] Progressed/Changed HEP based on:   [] positioning   [] body mechanics   [] transfers   [] heat/ice application    [] other:      Other Objective/Functional Measures:   right LE shorter by 1 mm then shortens by 3-4 mm in sitting- anteriorly rotated right innominate  right standing flexion  right LE shorter in supine and sitting after METs to correct anteriorly rotated right innominate- right innominate upslip  Strength hip flexors: 4/5 right, 4+/5 left  Hip extensors: 5/5 bilaterally  Hip adductors: 5/5 bilaterally  Hip abductors: 4+/5 bilaterally  Knee extensors: 4+/5 right, 5/5 left  Knee flexors: 4+/5 right, 5/5 left  Ankle DF: 4/5 right, 4-/5 left  SLR: 61 degrees right, 71 degrees left  Hamstring flexibility: 30 degrees right, (improved 10 degrees) 32 degrees left (improved 18 degrees)    Pain Level (0-10 scale) post treatment: 5/10 left LE, 0/10 back    ASSESSMENT/Changes in Function:    Pt reports improvement in function, flexibility, and ease of movement, but left LE pain continues to be persistent. Pt displayed anteriorly rotated right innominate-and right innominate upslip that were correctable using METs, but pt displays unstable innominates and required a subsequent correction today. Both corrections resulted in an immediate reduction of LBP and a sensation of symmetry of gait.   Pt displayed improved hamstring flexibility and improving strength of bilateral LEs (LEs' strength grossly 4/5 to 5/5 right LE and 4-/5 t 5/5 left LE). Pt would benefit from additional skilled PT to address pelvis instability and improve strength of back and LEs. Patient will continue to benefit from skilled PT services to modify and progress therapeutic interventions, address functional mobility deficits, address ROM deficits and address strength deficits to attain remaining goals. []  See Plan of Care  []  See progress note/recertification  []  See Discharge Summary         Progress towards goals / Updated goals:  Short Term Goals: To be accomplished in 3 weeks:  1. Improved postural alignment and awareness to allow self correction during ADL  Status at [facility]: postural training initiated  Current status: progressing (pt requires verbal cues to stand up straight during ambulation) 6/26/17      2. Patient reports reduction in pain to <or= 5/10 with ADL and ambulatory activities  Status at [facility]: pain ranging from 5-10/10  Current status: progressing (pain 7/10 at worst) 6/26/17      Long Term Goals: To be accomplished in 6 weeks:  1. Improved FOTO score to >or= 65/100 as evidence of improved function with less pain  Status at [facility]: FOTO 56/100  Current status: not met (FOTO: 50/100) 6/21/17      2. Improved proximal hip strength bilaterally to >or= 4-/5 for improved gait pattern and increased ease with dynamic ADL  Status at [facility]: Glut max MMT right 2/5, left 3/5  Current status: met (Strength hip flexors: 4/5 right, 4+/5 left, Hip extensors (sitting): 5/5 bilaterally, Hip adductors: 5/5 bilaterally, Hip abductors: 4+/5 bilaterally, Knee extensors: 4+/5 right, 5/5 left, Knee flexors: 4+/5 right, 5/5 left, Ankle DF: 4/5 right, 4-/5 left) 6/26/17      3.  Improved SLR to >or= 60 deg bilaterally and Left =Right flexibility for improved trunk neutrality and increased ease with stooping and dynamic activities  Status at [facility]: SLR left 40, right 30 deg  Current status: met (SLR: 61 degrees right, 71 degrees left) 6/26/17      4. Improved spinal mobility and ability to reach past mid shin for increased ease with all ADL  Status at Eval: marked HS flexibility deficit and FFD 8 inches with spinal FLex in standing  Current status: met (finger tip to floor reach 14 cm) 6/26/17      5.  Patient reports reduction in LB and LE pain to <or= 3/10 with ADL and community ambulation  Status at Eval: pain ranging from 5-10/10  Current status: progressing (pain 7/10 at worst) 6/26/17    PLAN  []  Upgrade activities as tolerated     [x]  Continue plan of care  []  Update interventions per flow sheet       []  Discharge due to:_  []  Other:_      Graydon Lesches, PT 6/26/2017  3:52 PM    Future Appointments  Date Time Provider Shilo Anderson   6/26/2017 4:00 PM Graydon Lesches, PT [facility]   6/28/2017 2:45 PM MD Jacky Roy   6/29/2017 4:00 PM Flory Stable, PT [facility]   7/5/2017 4:00 PM [facility] Stable, [facility]   7/6/2017 4:00 PM [facility] Stable, PT [facility] [place] [facility]   7/12/2017 4:00 PM [facility] Stable, PT [facility]   7/13/2017 4:00 PM [facility] Stable, PT [facility]   7/18/2017 4:00 PM Graydon Lesches, PT [facility]   7/20/2017 4:00 PM Kanwal Whittington, PT [facility]   7/25/2017 4:00 PM Graydon Lesches[facility]   7/27/2017 4:00 PM Kanwal Whittington, PT [facility]

## 2017-06-26 NOTE — PROGRESS NOTES
[facility] in [address] [place], [facility] [address]  Phone: [phone]      Fax:  [phone]    Physical Therapy Progress Note  Patient name: Glenys Wheeler Start of Care: 17   Referral source: Elvira Machado : 1950   Medical/Treatment Diagnosis: Spondylolisthesis, lumbar region [M43.16] Onset Date: chronic with recent exacerbation   Prior Hospitalization: see medical history Provider#: [id number]   Medications: Verified on Patient Summary List    Comorbidities: CS fusion, right hip dysfunction   Prior Level of Function: Full function with intermittent pain  Visits from Start of Care: 7     Missed Visits: 0    Established Goals:         Excellent           Good         Limited           None  [x] Increased ROM   []  [x]  []  []  [x] Increased Strength  []  [x]  [x]  []  [x] Increased Mobility  []  [x]  [x]  []   [x] Decreased Pain   []  [x]  [x]  []  [] Decreased Swelling  []  []  []  []    Key Functional Changes:    Pt reports improvement in function, flexibility, and ease of movement, but left LE pain continues to be persistent. Pt displayed anteriorly rotated right innominate-and right innominate upslip that were correctable using METs, but pt displays unstable innominates and required a subsequent correction today. Both corrections resulted in an immediate reduction of LBP and a sensation of symmetry of gait. Pt displayed improved hamstring flexibility and improving strength of bilateral LEs (LEs' strength grossly 4/5 to 5/5 right LE and 4-/5 t 5/5 left LE). Pt would benefit from additional skilled PT to address pelvis instability and improve strength of back and LEs. Goals addressed this period:  Short Term Goals: To be accomplished in 3 weeks:  1.  Improved postural alignment and awareness to allow self correction during ADL  Status at [facility]: postural training initiated  Current status: progressing (pt requires verbal cues to stand up straight during ambulation)      2. Patient reports reduction in pain to <or= 5/10 with ADL and ambulatory activities  Status at Eval: pain ranging from 5-10/10  Current status: progressing (pain 7/10 at worst)      Long Term Goals: To be accomplished in 6 weeks:  1. Improved FOTO score to >or= 65/100 as evidence of improved function with less pain  Status at Eval: FOTO 56/100  Current status: not met (FOTO: 50/100)      2. Improved proximal hip strength bilaterally to >or= 4-/5 for improved gait pattern and increased ease with dynamic ADL  Status at Eval: Glut max MMT right 2/5, left 3/5  Current status: met (Strength hip flexors: 4/5 right, 4+/5 left, Hip extensors (sitting): 5/5 bilaterally, Hip adductors: 5/5 bilaterally, Hip abductors: 4+/5 bilaterally, Knee extensors: 4+/5 right, 5/5 left, Knee flexors: 4+/5 right, 5/5 left, Ankle DF: 4/5 right, 4-/5 left)      3. Improved SLR to >or= 60 deg bilaterally and Left =Right flexibility for improved trunk neutrality and increased ease with stooping and dynamic activities  Status at Eval: SLR left 40, right 30 deg  Current status: met (SLR: 61 degrees right, 71 degrees left)      4. Improved spinal mobility and ability to reach past mid shin for increased ease with all ADL  Status at Eval: marked HS flexibility deficit and FFD 8 inches with spinal FLex in standing  Current status: met (finger tip to floor reach 14 cm)      5. Patient reports reduction in LB and LE pain to <or= 3/10 with ADL and community ambulation  Status at Eval: pain ranging from 5-10/10  Current status: progressing (pain 7/10 at worst)    Updated Goals: to be achieved in 4 weeks:   1) Continue with unmet goals above.     ASSESSMENT/RECOMMENDATIONS:  [x]Continue therapy per initial plan/protocol at a frequency of  2 x per week for 4 more weeks  []Continue therapy with the following recommended changes:_____________________ _____________________________________________________________________  []Discontinue therapy progressing towards or have reached established goals  []Discontinue therapy due to lack of appreciable progress towards goals  []Discontinue therapy due to lack of attendance or compliance  []Await Physician's recommendations/decisions regarding therapy  []Other:________________________________________________________________    Thank you for this referral.   Gela Odell, PT 6/26/2017 5:21 PM    NOTE TO PHYSICIAN:  [address]   FAX TO [facility]: [phone]  If you are unable to process this request in 24 hours please contact our office: [phone]      ____ I have read the above report and request that my patient continue therapy with the following changes/special instructions:  ____ I have read the above report and request that my patient be discharged from therapy    Physician's Signature:_____________________ Date:___________Time:__________

## 2017-06-28 ENCOUNTER — OFFICE VISIT (OUTPATIENT)
Dept: ORTHOPEDIC SURGERY | Age: 67
End: 2017-06-28

## 2017-06-28 VITALS
RESPIRATION RATE: 16 BRPM | HEART RATE: 69 BPM | BODY MASS INDEX: 25.17 KG/M2 | WEIGHT: 179.8 LBS | SYSTOLIC BLOOD PRESSURE: 93 MMHG | HEIGHT: 71 IN | DIASTOLIC BLOOD PRESSURE: 64 MMHG

## 2017-06-28 DIAGNOSIS — M51.36 OTHER INTERVERTEBRAL DISC DEGENERATION, LUMBAR REGION: ICD-10-CM

## 2017-06-28 DIAGNOSIS — S83.207A TEAR OF MENISCUS OF LEFT KNEE, UNSPECIFIED MENISCUS, UNSPECIFIED TEAR TYPE, UNSPECIFIED WHETHER OLD OR CURRENT TEAR, INITIAL ENCOUNTER: Primary | ICD-10-CM

## 2017-06-28 DIAGNOSIS — M51.36 DDD (DEGENERATIVE DISC DISEASE), LUMBAR: ICD-10-CM

## 2017-06-28 DIAGNOSIS — M51.26 OTHER INTERVERTEBRAL DISC DISPLACEMENT, LUMBAR REGION: ICD-10-CM

## 2017-06-28 DIAGNOSIS — M48.061 LUMBAR SPINAL STENOSIS: ICD-10-CM

## 2017-06-28 DIAGNOSIS — M96.1 CERVICAL POSTLAMINECTOMY SYNDROME: ICD-10-CM

## 2017-06-28 RX ORDER — HYDROCODONE BITARTRATE AND ACETAMINOPHEN 5; 300 MG/1; MG/1
TABLET ORAL
Status: ON HOLD | COMMUNITY
End: 2017-09-07

## 2017-06-28 RX ORDER — PHENOL/SODIUM PHENOLATE
20 AEROSOL, SPRAY (ML) MUCOUS MEMBRANE
COMMUNITY
Start: 2016-12-19

## 2017-06-28 RX ORDER — TIAGABINE HYDROCHLORIDE 4 MG/1
4 TABLET, FILM COATED ORAL 2 TIMES DAILY WITH MEALS
Qty: 60 TAB | Refills: 1 | Status: SHIPPED | OUTPATIENT
Start: 2017-06-28 | End: 2017-08-23 | Stop reason: SDUPTHER

## 2017-06-28 RX ORDER — LISINOPRIL 20 MG/1
20 TABLET ORAL
COMMUNITY
Start: 2016-12-19 | End: 2017-06-28 | Stop reason: SDUPTHER

## 2017-06-28 RX ORDER — ETODOLAC 400 MG/1
400 TABLET, FILM COATED ORAL
Status: ON HOLD | COMMUNITY
Start: 2016-12-19 | End: 2017-09-07

## 2017-06-28 NOTE — MR AVS SNAPSHOT
Visit Information Date & Time Provider Department Dept. Phone Encounter #  
 6/28/2017  2:45 PM Khalida Pickett MD [address] [place] [address], [address] and [facility] [phone] [id number] Follow-up Instructions Return for following block. Upcoming Health Maintenance Date Due Hepatitis C Screening 1950 Pneumococcal 19-64 Medium Risk (1 of 1 - PPSV23) 10/18/1971 DTaP/Tdap/Td series (1 - Tdap) 10/18/1973 FOBT Q 1 YEAR AGE 50-75 10/18/2002 ZOSTER VACCINE AGE 60> 10/18/2012 INFLUENZA AGE 9 TO ADULT 8/1/2017 Allergies as of 6/28/2017  Review Complete On: 6/28/2017 By: Khalida Pickett MD  
 No Known Allergies Current Immunizations  Never Reviewed No immunizations on file. Not reviewed this visit You Were Diagnosed With   
  
 Codes Comments Tear of meniscus of left knee, unspecified meniscus, unspecified tear type, unspecified whether old or current tear, initial encounter    -  Primary ICD-10-CM: R12.246O ICD-9-CM: 836.2 Other intervertebral disc degeneration, lumbar region     ICD-10-CM: M51.36 
ICD-9-CM: 722.52 Other intervertebral disc displacement, lumbar region     ICD-10-CM: M51.26 
ICD-9-CM: 722.10 Lumbar spinal stenosis     ICD-10-CM: M48.06 
ICD-9-CM: 724.02   
 DDD (degenerative disc disease), lumbar     ICD-10-CM: M51.36 
ICD-9-CM: 722.52 Cervical postlaminectomy syndrome     ICD-10-CM: M96.1 ICD-9-CM: 722.81 Vitals BP Pulse Resp Height(growth percentile) Weight(growth percentile) BMI  
 93/64 69 16 5' 11\" (1.803 m) 179 lb 12.8 oz (81.6 kg) 25.08 kg/m2 Smoking Status Current Every Day Smoker BMI and BSA Data Body Mass Index Body Surface Area 25.08 kg/m 2 2.02 m 2 Preferred Pharmacy Pharmacy Name Phone [phone] [facility] [phone] Your Updated Medication List  
  
   
 This list is accurate as of: 6/28/17  3:44 PM.  Always use your most recent med list.  
  
  
  
  
 aspirin delayed-release 81 mg tablet Take  by mouth daily. baclofen 10 mg tablet Commonly known as:  LIORESAL Take 20 mg by mouth three (3) times daily. etodolac 400 mg tablet Commonly known as:  LODINE  
400 mg.  
  
 gabapentin 800 mg tablet Commonly known as:  NEURONTIN Take 300 mg by mouth three (3) times daily. lisinopril-hydroCHLOROthiazide 20-12.5 mg per tablet Commonly known as:  Abdirahman  Take  by mouth daily. Omeprazole delayed release 20 mg tablet Commonly known as:  PRILOSEC D/R  
20 mg.  
  
 tiaGABine 4 mg tablet Commonly known as:  GABITRIL Take 1 Tab by mouth two (2) times daily (with meals). VICODIN 5-300 mg tablet Generic drug:  HYDROcodone-acetaminophen Take  by mouth. Prescriptions Sent to Pharmacy Refills  
 tiaGABine (GABITRIL) 4 mg tablet 1 Sig: Take 1 Tab by mouth two (2) times daily (with meals). Class: Normal  
 Pharmacy: [facility], [address] [facility] #: [phone] Route: Oral  
  
Follow-up Instructions Return for following block. To-Do List   
 06/29/2017 4:00 PM  
  Appointment with Kandy Gardiner PT at THE [facility] ([phone])  
  
 07/05/2017 4:00 PM  
  Appointment with Kandy Gardiner PT at THE [facility] ([phone]) 07/06/2017 4:00 PM  
  Appointment with Kandy Gardiner PT at THE [facility] ([phone])  
  
 07/12/2017 4:00 PM  
  Appointment with Kandy Gardiner PT at THE [facility] ([phone])  
  
 07/13/2017 4:00 PM  
  Appointment with Kandy Gardiner PT at THE [facility] ([phone])  
  
 07/18/2017 4:00 PM  
  Appointment with Edelmira Fung PT at THE [facility] ([phone])  
  
 07/20/2017 4:00 PM  
  Appointment with Kandy Gardiner PT at THE [facility] ([phone]) 07/25/2017 4:00 PM  
  Appointment with Cynthia Da Silva PT at THE [facility] ([phone])  
  
 07/27/2017 4:00 PM  
  Appointment with May Lou PT at THE [facility] ([phone]) Introducing [facility] SERVICES! Harrison Reynolds introduces [employer] patient portal. Now you can access parts of your medical record, email your doctor's office, and request medication refills online. 1. In your internet browser, go to [email or web address] 2. Click on the First Time User? Click Here link in the Sign In box. You will see the New Member Sign Up page. 3. Enter your [employer] Access Code exactly as it appears below. You will not need to use this code after youve completed the sign-up process. If you do not sign up before the expiration date, you must request a new code. · [employer] Access Code: [email or web address] Expires: 8/1/2017 12:01 PM 
 
4. Enter the last four digits of your Social Security Number (xxxx) and Date of Birth (mm/dd/yyyy) as indicated and click Submit. You will be taken to the next sign-up page. 5. Create a [employer] ID. This will be your [employer] login ID and cannot be changed, so think of one that is secure and easy to remember. 6. Create a [employer] password. You can change your password at any time. 7. Enter your Password Reset Question and Answer. This can be used at a later time if you forget your password. 8. Enter your e-mail address. You will receive e-mail notification when new information is available in [address] 9. Click Sign Up. You can now view and download portions of your medical record. 10. Click the Download Summary menu link to download a portable copy of your medical information. If you have questions, please visit the Frequently Asked Questions section of the [employer] website. Remember, [employer] is NOT to be used for urgent needs. For medical emergencies, dial 911. Now available from your iPhone and Android! Please provide this summary of care documentation to your next provider. Your primary care clinician is listed as Lorenzo Wooten. If you have any questions after today's visit, please call [phone].

## 2017-06-28 NOTE — PROGRESS NOTES
[facility]  [address]  [address], [address]   Phone: [phone]  Fax: [phone]        PROGRESS NOTE      HISTORY OF PRESENT ILLNESS:  The patient is a 61 y.o. male and was seen today for follow up of low back pain to the BLE, as well as neck pain extending into the RUE with a diagnoses of cervical postlaminectomy syndrome. His primary complaint is of low back pain extending posteriorly into the BLE (L>R) to the feet. Pt has complaints of limitations in standing and walking tolerance and describes symptoms consistent with stenosis. Patient has hx of cervical spine surgery performed by Dr. Liss Maldonado in 2010. He is followed by Dr. Nasir Dee for his shoulder. Noted, I previously had him on LYRICA which he states caused GI upset. Upon review of my note, he had failed higher doses of NEURONTIN. Pt has failed CYMBALTA as it has provided no relief of his symptoms. He reports intolerance to TOPAMAX 75 mg qhs secondary to GI upset. The patient reports significant temporary relief with previous shoulder injections. An EMG from 12/8/15 revealed a study consistent with a chronic appearing cervical radiculopathy superimposed on mild carpal tunnel syndrome per report. 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 The size and extent of the abnormality appears to be stable to slightly increased in a caudal direction compared with the previous study. There is focal enhancement involving the anterolateral cord at the C3-C4 level. Otherwise, stable lower cervical degenerative disc disease changes with right greater than left foraminal stenoses. Right upper extremity MRI from 7/2/11 per report, abnormal appearance to the glenoid, which appears slightly dysplastic and flattened, with some mild signal abnormality.  Additionally, the coracoid process appears elongated. Cystic change in the adjacent humeral head, suggesting the possibility of the tentative injury. Question if findings are due to congenital dysplasia or (remote) posttraumatic deformity. Mild tendinosis of the supraspinatus and infraspinatus tendons, without tear. The labrum is degenerative in appearance, without definite tear. Moderate cartilage loss in the glenohumeral joint, particularly along the glenoid. Trace glenohumeral joint effusion, with fluid loculated in the subcoracoid recess. Approximately 5x9x5 mm focus of low T2 signal within the subcoracoid recess, which may represent and intra-articular body. Minimal edema within the subacromial, subdeltoid bursa. Moderate acromioclavicular joint osteoarthrosis, with mild impingement. Mild biceps tenosynovitis. Preliminary reading of cervical plain films revealed ACDF C3-4 hardware intact. No acute pathology identified. Degenerative disc at C5/C6 and C6/C7. Preliminary reading of lumbar plain films revealed grade 1 anterolisthesis of L4 on L5. Pan lumbar degenerative disc disease. No acute pathology identified. Lumbar spine MRI from 4/24/16 was reviewed. Per report, there is spondylolisthesis at L4-L5, with disc disease and facet arthropathy causing central/foraminal stenosis with exiting L4 nerve root compression. There is severe central stenosis at L3-L4 from disc disease and facet hypertrophy. Posterior epidural fat also present. Suspect transitional vertebra. Prior to intervention, level confirmation with additional radiographs recommended. At his last clinical appointment, there was evidence of degenerative changes noted on his lumbar spine MRI. He did not think he could tolerate a stronger dose of Topamax. I weaned him of Topamax and started him on Gabitril. I ordered a L2-L3 lumbar epidural.     I last saw patient on 4/29/16 and was scheduled for an L2/3 epidural and was switched from Topamax to Gabitril. He failed to f/u until today.  Apparently, patient was seen by [facility] who referred patient back to me. The patient returns today with low back pain as well as distal LLE pain. His chief complaint is that of left knee pain. He rates pain 4-8/10, elevated since his last visit (5/10). His pain is exacerbated with prolonged standing and describes symptoms consistent for stenosis. Pt underwent L2/L3 epidural on 6/2/16 without relief. ER note from Dr. Xavier Arias dated 2/28/17 indicating patient presented for left knee pain. Pt recalls tolerating Gabitril but was not relieving his pain. Pt is currently taking NEURONTIN 300 mg BID prn, which according to my previous records he failed. He reports urinary frequency but denies incontinence. He denies h/o DM. Lumbar spine MRI dated 5/5/17 reviewed. Per report, multiple level degenerative changes, which resulted in neuroforaminal narrowing and central canal stenosis. At L2-L3, there was a large anterior disc osteophyte complex. At L3-L4, moderate bilateral neuroforaminal narrowing. At L4-L5, there is a 1.2 mm of anterolisthesis of L4 on L5 with no definitive pars defects. Moderate sized broad-based anterior disc osteophyte complex. Mild-to-moderate central canal stenosis. At L5-S1, mild to moderate central canal stenosis. Moderate bilateral neuroforaminal narrowing. LT knee MRI dated 5/11/17 per report revealed edema and fluid within ACL tendon fibers, likely ganglion cyst versus low-grade partial tear. Other major ligaments appear intact. Lateral meniscus possible posterior horn tear. No definite medial meniscus tear. Patchy high-grade cartilage loss in the patellofemoral and medial compartments, grade 3-4. edema in the suprapatellar fat pad may be seen with quadriceps fat pad impingement. trace joint effusion. Minimal Hoffa's fat pad edema. Small amount of fluid surrounding the semimembranosus tendon, possibly bursitis or tracking popliteal cyst.  reviewed.        Past Medical History:   Diagnosis Date    Arthritis     Cervical postlaminectomy syndrome     Chronic neck pain     Fall     Hypertension     OA (osteoarthritis) of knee     left knee        Social History     Social History    Marital status:      Spouse name: N/A    Number of children: N/A    Years of education: N/A     Occupational History    Retired      Social History Main Topics    Smoking status: Current Every Day Smoker     Years: 13.00     Types: Cigarettes    Smokeless tobacco: Never Used      Comment: 9 cigarettes per day    Alcohol use Yes    Drug use: Not on file    Sexual activity: Not Currently     Partners: Female     Other Topics Concern    Not on file     Social History Narrative       Current Outpatient Prescriptions   Medication Sig Dispense Refill    Omeprazole delayed release (PRILOSEC D/R) 20 mg tablet 20 mg.      HYDROcodone-acetaminophen (VICODIN) 5-300 mg tablet Take  by mouth.  tiaGABine (GABITRIL) 4 mg tablet Take 1 Tab by mouth two (2) times daily (with meals). 60 Tab 1    gabapentin (NEURONTIN) 800 mg tablet Take 300 mg by mouth three (3) times daily.  baclofen (LIORESAL) 10 mg tablet Take 20 mg by mouth three (3) times daily.  aspirin delayed-release 81 mg tablet Take  by mouth daily.  lisinopril-hydrochlorothiazide (PRINZIDE, ZESTORETIC) 20-12.5 mg per tablet Take  by mouth daily.  etodolac (LODINE) 400 mg tablet 400 mg. No Known Allergies       PHYSICAL EXAMINATION    Visit Vitals    BP 93/64    Pulse 69    Resp 16    Ht 5' 11\" (1.803 m)    Wt 179 lb 12.8 oz (81.6 kg)    BMI 25.08 kg/m2       CONSTITUTIONAL: NAD, A&O x 3  SENSATION: Intact to light touch throughout  RANGE OF MOTION: The patient has full passive range of motion in all four extremities.   MOTOR:  Straight Leg Raise: Negative, bilateral               Hip Flex Knee Ext Knee Flex Ankle DF GTE Ankle PF Tone   Right +4/5 +4/5 +4/5 +4/5 +4/5 +4/5 +4/5   Left +4/5 +4/5 +4/5 +4/5 +4/5 +4/5 +4/5 ASSESSMENT   Maxwell was seen today for back pain, hip pain and knee pain. Diagnoses and all orders for this visit:    Tear of meniscus of left knee, unspecified meniscus, unspecified tear type, unspecified whether old or current tear, initial encounter    Other intervertebral disc degeneration, lumbar region    Other intervertebral disc displacement, lumbar region    Lumbar spinal stenosis    DDD (degenerative disc disease), lumbar    Cervical postlaminectomy syndrome    Other orders  -     tiaGABine (GABITRIL) 4 mg tablet; Take 1 Tab by mouth two (2) times daily (with meals). -     SCHEDULE SURGERY        IMPRESSION AND PLAN:  The patient has complaints of low back and distal RLE pain. His primary complaint is that of left knee pain. I will refer him to ortho for further evaluation and treatment of his left knee. For his low back pain, patient is interested in proceeding with lumbar blocks. I will order an L4/5 epidural.  I will restart him on Gabitril 2 mg BID. Patient advised to call the office if intolerant to medication. I will see the patient back following block. Written by Tamiko Meyer, as dictated by Maren Mi MD  I examined the patient, reviewed and agree with the note.

## 2017-06-29 ENCOUNTER — HOSPITAL ENCOUNTER (OUTPATIENT)
Dept: PHYSICAL THERAPY | Age: 67
Discharge: HOME | End: 2017-06-29
Payer: OTHER GOVERNMENT

## 2017-06-29 NOTE — PROGRESS NOTES
PT DAILY TREATMENT NOTE     Patient Name: Tish Gusman  Date:2017  : 1950  [x]  Patient  Verified  Payor:  / Plan: [facility]  RETIREES AND DEPENDENTS / Product Type:  /    In time:350  Out time:435  Total Treatment Time (min): 45  Visit #: 8 of 12    Treatment Area: Spondylolisthesis, lumbar region [M43.16]    SUBJECTIVE  Pain Level (0-10 scale): 5-6/10  Left LE, 3/10 back  Any medication changes, allergies to medications, adverse drug reactions, diagnosis change, or new procedure performed?: [x] No    [] Yes (see summary sheet for update)  Subjective functional status/changes:   [] No changes reported  \"I just saw my doctor yesterday. My epidural in my back in April did not work. \"     OBJECTIVE    Modality rationale:    Min Type Additional Details    [] Estim:  []Unatt       []IFC  []Premod                        []Other:  []w/ice   []w/heat  Position:  Location:    [] Estim: []Att    []TENS instruct  []NMES                    []Other:  []w/US   []w/ice   []w/heat  Position:  Location:    []  Traction: [] Cervical       []Lumbar                       [] Prone          []Supine                       []Intermittent   []Continuous Lbs:  [] before manual  [] after manual    []  Ultrasound: []Continuous   [] Pulsed                           []1MHz   []3MHz W/cm2:  Location:    []  Iontophoresis with dexamethasone         Location: [] Take home patch   [] In clinic    []  Ice     []  heat  []  Ice massage  []  Laser   []  Anodyne Position:  Location:    []  Laser with stim  []  Other:  Position:  Location:    []  Vasopneumatic Device Pressure:       [] lo [] med [] hi   Temperature: [] lo [] med [] hi   [] Skin assessment post-treatment:  []intact []redness- no adverse reaction    []redness - adverse reaction:      min []Eval                  [x]Re-Eval       15 min Therapeutic Exercise:  [x] See flow sheet :        min Therapeutic Activity:  []  See flow sheet :        15 min Neuromuscular Re-education:  [x]  See flow sheet :   Rationale: increase strength, improve coordination and increase proprioception  to improve the patients ability to tolerate positions and maintain innominate alignment. 15 min Manual Therapy:distraction and mobilization right hip, passive stretching right hip Flex and quad in prone and supine       Rationale: decrease pain and correct joint alignment and joint mechanics to tolerate positions and improve gait. min Gait Training:  ___ feet with ___ device on level surfaces with ___ level of assist   Rationale: With   [] TE   [] TA   [] neuro   [] other: Patient Education: [x] Review HEP    [] Progressed/Changed HEP based on:   [] positioning   [] body mechanics   [] transfers   [] heat/ice application    [] other:      Other Objective/Functional Measures:   Primary c/o stiffness prema right LE and right shoulder  Marked pelvic shift causing asymmetry in hip/trunk/trunk alignment    Pain Level (0-10 scale) post treatment: 3-4/10 left LE, 0/10 back    ASSESSMENT/Changes in Function:    Pt reports improvement in function, flexibility, and ease of movement, but left LE pain continues to be persistent. Pt displayed anteriorly rotated right innominate-and right innominate upslip that were correctable using METs, but pt displays unstable innominates and required a subsequent correction today. Both corrections resulted in an immediate reduction of LBP and a sensation of symmetry of gait. Pt displayed improved hamstring flexibility and improving strength of bilateral LEs (LEs' strength grossly 4/5 to 5/5 right LE and 4-/5 t 5/5 left LE). Pt would benefit from additional skilled PT to address pelvis instability and improve strength of back and LEs.     Patient will continue to benefit from skilled PT services to modify and progress therapeutic interventions, address functional mobility deficits, address ROM deficits and address strength deficits to attain remaining goals. [x]  See Plan of Care  []  See progress note/recertification  []  See Discharge Summary         Progress towards goals / Updated goals:  Short Term Goals: To be accomplished in 3 weeks:  1. Improved postural alignment and awareness to allow self correction during ADL  Status at [facility]: postural training initiated  Current status: progressing (pt requires verbal cues to stand up straight during ambulation) 6/26/17      2. Patient reports reduction in pain to <or= 5/10 with ADL and ambulatory activities  Status at [facility]: pain ranging from 5-10/10  Current status: progressing (pain 7/10 at worst) 6/26/17      Long Term Goals: To be accomplished in 6 weeks:  1. Improved FOTO score to >or= 65/100 as evidence of improved function with less pain  Status at [facility]: FOTO 56/100  Current status: not met (FOTO: 50/100) 6/21/17      2. Improved proximal hip strength bilaterally to >or= 4-/5 for improved gait pattern and increased ease with dynamic ADL  Status at [facility]: Glut max MMT right 2/5, left 3/5  Current status: met (Strength hip flexors: 4/5 right, 4+/5 left, Hip extensors (sitting): 5/5 bilaterally, Hip adductors: 5/5 bilaterally, Hip abductors: 4+/5 bilaterally, Knee extensors: 4+/5 right, 5/5 left, Knee flexors: 4+/5 right, 5/5 left, Ankle DF: 4/5 right, 4-/5 left) 6/26/17      3. Improved SLR to >or= 60 deg bilaterally and Left =Right flexibility for improved trunk neutrality and increased ease with stooping and dynamic activities  Status at [facility]: SLR left 40, right 30 deg  Current status: met (SLR: 61 degrees right, 71 degrees left) 6/26/17      4. Improved spinal mobility and ability to reach past mid shin for increased ease with all ADL  Status at [facility]: marked HS flexibility deficit and FFD 8 inches with spinal FLex in standing  Current status: met (finger tip to floor reach 14 cm) 6/26/17      5.  Patient reports reduction in LB and LE pain to <or= 3/10 with ADL and community ambulation  Status at [facility]: pain ranging from 5-10/10  Current status: progressing (pain 7/10 at worst) 6/26/17    PLAN  []  Upgrade activities as tolerated     [x]  Continue plan of care  []  Update interventions per flow sheet       []  Discharge due to:_  []  Other:_      Cj Level, PT 6/29/2017  3:52 PM    Future Appointments  Date Time Provider Shilo Monica   6/29/2017 4:00 PM Katherleen Level, PT [facility]   7/5/2017 4:00 PM Katherleen Level, PT [facility]   7/6/2017 4:00 PM Katherleen Level, PT [facility]   7/12/2017 4:00 PM Katherleen Level, PT [facility]   7/18/2017 3:00 PM Salvatore Narvaez MD [facility]   7/18/2017 4:00 PM Lo Manifold V, PT [facility]   7/20/2017 4:00 PM Kanwal Hartley bridge, PT [facility]   7/25/2017 4:00 PM Lo Manifold V, PT [facility]   7/27/2017 4:00 PM Kanwalsaleem Hartley bridge, PT [facility]   8/23/2017 4:00 PM [facility]

## 2017-07-05 ENCOUNTER — HOSPITAL ENCOUNTER (OUTPATIENT)
Dept: PHYSICAL THERAPY | Age: 67
Discharge: HOME | End: 2017-07-05
Payer: OTHER GOVERNMENT

## 2017-07-05 PROCEDURE — 97112 NEUROMUSCULAR REEDUCATION: CPT

## 2017-07-05 PROCEDURE — 97110 THERAPEUTIC EXERCISES: CPT

## 2017-07-05 NOTE — PROGRESS NOTES
PT DAILY TREATMENT NOTE     Patient Name: Gerard Conti  Date:2017  : 1950  [x]  Patient  Verified  Payor:  / Plan: [facility]  RETIREES AND DEPENDENTS / Product Type:  /    In time:355  Out time:446  Total Treatment Time (min): 51  Visit #: 9 of 12    Treatment Area: Spondylolisthesis, lumbar region [M43.16]    SUBJECTIVE  Pain Level (0-10 scale): 5/10  Left knee, 3/10 back  Any medication changes, allergies to medications, adverse drug reactions, diagnosis change, or new procedure performed?: [x] No    [] Yes (see summary sheet for update)  Subjective functional status/changes:   [] No changes reported  Reports fluctuating pain levels    OBJECTIVE    Modality rationale:    Min Type Additional Details    [] Estim:  []Unatt       []IFC  []Premod                        []Other:  []w/ice   []w/heat  Position:  Location:    [] Estim: []Att    []TENS instruct  []NMES                    []Other:  []w/US   []w/ice   []w/heat  Position:  Location:    []  Traction: [] Cervical       []Lumbar                       [] Prone          []Supine                       []Intermittent   []Continuous Lbs:  [] before manual  [] after manual    []  Ultrasound: []Continuous   [] Pulsed                           []1MHz   []3MHz W/cm2:  Location:    []  Iontophoresis with dexamethasone         Location: [] Take home patch   [] In clinic    []  Ice     []  heat  []  Ice massage  []  Laser   []  Anodyne Position:  Location:    []  Laser with stim  []  Other:  Position:  Location:    []  Vasopneumatic Device Pressure:       [] lo [] med [] hi   Temperature: [] lo [] med [] hi   [] Skin assessment post-treatment:  []intact []redness- no adverse reaction    []redness - adverse reaction:      min []Eval                  [x]Re-Eval       30 min Therapeutic Exercise:  [x] See flow sheet :flexibility, Right LE strengthening        min Therapeutic Activity:  []  See flow sheet :        21 min Neuromuscular Re-education:  [x]  See flow sheet :TERRELL for postural alignment, core strengthening   Rationale: increase strength, improve coordination and increase proprioception  to improve the patients ability to tolerate positions and maintain innominate alignment. min Manual Therapy:          min Gait Training:  ___ feet with ___ device on level surfaces with ___ level of assist   Rationale: With   [] TE   [] TA   [] neuro   [] other: Patient Education: [x] Review HEP    [] Progressed/Changed HEP based on:   [] positioning   [] body mechanics   [] transfers   [] heat/ice application    [] other:      Other Objective/Functional Measures:   Difficulty with right shoulder stability during advanced QP activities  Marked right scapula alata    Pain Level (0-10 scale) post treatment: 5/10 left LE, 3/10 back    ASSESSMENT/Changes in Function:    Pt reports improvement in function, flexibility, and ease of movement, but left LE pain continues to be persistent. Pt displayed anteriorly rotated right innominate-and right innominate upslip that were correctable using METs, but pt displays unstable innominates and required a subsequent correction today. Both corrections resulted in an immediate reduction of LBP and a sensation of symmetry of gait. Pt displayed improved hamstring flexibility and improving strength of bilateral LEs (LEs' strength grossly 4/5 to 5/5 right LE and 4-/5 t 5/5 left LE). Pt would benefit from additional skilled PT to address pelvis instability and improve strength of back and LEs. Patient will continue to benefit from skilled PT services to modify and progress therapeutic interventions, address functional mobility deficits, address ROM deficits and address strength deficits to attain remaining goals. [x]  See Plan of Care  []  See progress note/recertification  []  See Discharge Summary         Progress towards goals / Updated goals:  Short Term Goals: To be accomplished in 3 weeks:  1. Improved postural alignment and awareness to allow self correction during ADL  Status at Eval: postural training initiated  Current status: progressing (pt requires verbal cues to stand up straight during ambulation) 6/26/17      2. Patient reports reduction in pain to <or= 5/10 with ADL and ambulatory activities  Status at [facility]: pain ranging from 5-10/10  Current status: progressing (pain 7/10 at worst) 6/26/17      Long Term Goals: To be accomplished in 6 weeks:  1. Improved FOTO score to >or= 65/100 as evidence of improved function with less pain  Status at [facility]: FOTO 56/100  Current status: not met (FOTO: 50/100) 6/21/17      2. Improved proximal hip strength bilaterally to >or= 4-/5 for improved gait pattern and increased ease with dynamic ADL  Status at [facility]: Glut max MMT right 2/5, left 3/5  Current status: met (Strength hip flexors: 4/5 right, 4+/5 left, Hip extensors (sitting): 5/5 bilaterally, Hip adductors: 5/5 bilaterally, Hip abductors: 4+/5 bilaterally, Knee extensors: 4+/5 right, 5/5 left, Knee flexors: 4+/5 right, 5/5 left, Ankle DF: 4/5 right, 4-/5 left) 6/26/17      3. Improved SLR to >or= 60 deg bilaterally and Left =Right flexibility for improved trunk neutrality and increased ease with stooping and dynamic activities  Status at [facility]: SLR left 40, right 30 deg  Current status: met (SLR: 61 degrees right, 71 degrees left) 6/26/17      4. Improved spinal mobility and ability to reach past mid shin for increased ease with all ADL  Status at Eval: marked HS flexibility deficit and FFD 8 inches with spinal FLex in standing  Current status: met (finger tip to floor reach 14 cm) 6/26/17      5.  Patient reports reduction in LB and LE pain to <or= 3/10 with ADL and community ambulation  Status at [facility]: pain ranging from 5-10/10  Current status: progressing (pain 7/10 at worst) 6/26/17    PLAN  []  Upgrade activities as tolerated     [x]  Continue plan of care  []  Update interventions per flow sheet []  Discharge due to:_  []  Other:_      Skyler Brandon PT 7/5/2017  3:52 PM    Future Appointments  Date Time Provider Shilo Monica   7/6/2017 4:00 PM Juvenal Hartley [facility]   7/12/2017 4:00 PM Kanwal Castro [facility]   7/18/2017 4:00 PM Elio DAVIS PT [facility]   7/20/2017 4:00 PM Kanwal Castro [facility]   7/25/2017 4:00 PM Elio DAVIS PT [facility]   7/26/2017 3:40 PM Heide Nathan MD [place] [id number]   7/26/2017 4:00 PM Kanwal dupont PT [facility]   8/23/2017 4:00 PM Uvaldo Bowen MD [facility]

## 2017-07-06 ENCOUNTER — HOSPITAL ENCOUNTER (OUTPATIENT)
Dept: PHYSICAL THERAPY | Age: 67
Discharge: HOME | End: 2017-07-06
Payer: OTHER GOVERNMENT

## 2017-07-06 PROCEDURE — 97110 THERAPEUTIC EXERCISES: CPT

## 2017-07-06 PROCEDURE — 97140 MANUAL THERAPY 1/> REGIONS: CPT

## 2017-07-06 PROCEDURE — 97112 NEUROMUSCULAR REEDUCATION: CPT

## 2017-07-12 ENCOUNTER — HOSPITAL ENCOUNTER (OUTPATIENT)
Dept: PHYSICAL THERAPY | Age: 67
Discharge: HOME | End: 2017-07-12
Payer: OTHER GOVERNMENT

## 2017-07-12 PROCEDURE — 97112 NEUROMUSCULAR REEDUCATION: CPT

## 2017-07-12 PROCEDURE — 97110 THERAPEUTIC EXERCISES: CPT

## 2017-07-13 ENCOUNTER — APPOINTMENT (OUTPATIENT)
Dept: PHYSICAL THERAPY | Age: 67
End: 2017-07-13
Payer: OTHER GOVERNMENT

## 2017-07-18 ENCOUNTER — HOSPITAL ENCOUNTER (OUTPATIENT)
Dept: PHYSICAL THERAPY | Age: 67
Discharge: HOME | End: 2017-07-18
Payer: OTHER GOVERNMENT

## 2017-07-18 PROCEDURE — 97112 NEUROMUSCULAR REEDUCATION: CPT

## 2017-07-18 PROCEDURE — 97110 THERAPEUTIC EXERCISES: CPT

## 2017-07-18 NOTE — PROGRESS NOTES
PT DAILY TREATMENT NOTE     Patient Name: Vijay Erp  Date:2017  : 1950  [x]  Patient  Verified  Payor:  / Plan: [facility]  RETIREES AND DEPENDENTS / Product Type:  /    In time:357  Out time:455  Total Treatment Time (min): 62  Visit #: 9 of 12    Treatment Area: Spondylolisthesis, lumbar region [M43.16]    SUBJECTIVE  Pain Level (0-10 scale):  5/10 back  Any medication changes, allergies to medications, adverse drug reactions, diagnosis change, or new procedure performed?: [x] No    [] Yes (see summary sheet for update)  Subjective functional status/changes:   [] No changes reported  The pain fluctuates    OBJECTIVE    Modality rationale:    Min Type Additional Details    [] Estim:  []Unatt       []IFC  []Premod                        []Other:  []w/ice   []w/heat  Position:  Location:    [] Estim: []Att    []TENS instruct  []NMES                    []Other:  []w/US   []w/ice   []w/heat  Position:  Location:    []  Traction: [] Cervical       []Lumbar                       [] Prone          []Supine                       []Intermittent   []Continuous Lbs:  [] before manual  [] after manual    []  Ultrasound: []Continuous   [] Pulsed                           []1MHz   []3MHz W/cm2:  Location:    []  Iontophoresis with dexamethasone         Location: [] Take home patch   [] In clinic    []  Ice     []  heat  []  Ice massage  []  Laser   []  Anodyne Position:  Location:    []  Laser with stim  []  Other:  Position:  Location:    []  Vasopneumatic Device Pressure:       [] lo [] med [] hi   Temperature: [] lo [] med [] hi   [] Skin assessment post-treatment:  []intact []redness- no adverse reaction    []redness - adverse reaction:      min []Eval                  [x]Re-Eval       28 min Therapeutic Exercise:  [x] See flow sheet :flexibility, Right LE strengthening        min Therapeutic Activity:  []  See flow sheet :        30 min Neuromuscular Re-education:  [x]  See flow sheet :TERRELL for postural alignment, core strengthening   Rationale: increase strength, improve coordination and increase proprioception  to improve the patients ability to tolerate positions and maintain innominate alignment. min Manual Therapy:          min Gait Training:  ___ feet with ___ device on level surfaces with ___ level of assist   Rationale: With   [] TE   [] TA   [] neuro   [] other: Patient Education: [x] Review HEP    [] Progressed/Changed HEP based on:   [] positioning   [] body mechanics   [] transfers   [] heat/ice application    [] other:      Other Objective/Functional Measures:   Difficulty/fatigue with right shoulder stability during advanced QP activities  Marked right scapula alata    Pain Level (0-10 scale) post treatment: 4/10 back    ASSESSMENT/Changes in Function:    Pt reports improvement in function, flexibility, and ease of movement, but left LE pain continues to be persistent. Pt displayed anteriorly rotated right innominate-and right innominate upslip that were correctable using METs, but pt displays unstable innominates and required a subsequent correction today. Both corrections resulted in an immediate reduction of LBP and a sensation of symmetry of gait. Pt displayed improved hamstring flexibility and improving strength of bilateral LEs (LEs' strength grossly 4/5 to 5/5 right LE and 4-/5 t 5/5 left LE). Pt would benefit from additional skilled PT to address pelvis instability and improve strength of back and LEs. Patient will continue to benefit from skilled PT services to modify and progress therapeutic interventions, address functional mobility deficits, address ROM deficits and address strength deficits to attain remaining goals. [x]  See Plan of Care  []  See progress note/recertification  []  See Discharge Summary         Progress towards goals / Updated goals:  Short Term Goals: To be accomplished in 3 weeks:  1.  Improved postural alignment and awareness to allow self correction during ADL  Status at [facility]: postural training initiated  Current status: progressing (pt requires verbal cues to stand up straight during ambulation) 6/26/17      2. Patient reports reduction in pain to <or= 5/10 with ADL and ambulatory activities  Status at [facility]: pain ranging from 5-10/10  Current status: progressing (pain 7/10 at worst) 6/26/17      Long Term Goals: To be accomplished in 6 weeks:  1. Improved FOTO score to >or= 65/100 as evidence of improved function with less pain  Status at [facility]: FOTO 56/100  Current status: not met (FOTO: 50/100) 6/21/17      2. Improved proximal hip strength bilaterally to >or= 4-/5 for improved gait pattern and increased ease with dynamic ADL  Status at [facility]: Glut max MMT right 2/5, left 3/5  Current status: met (Strength hip flexors: 4/5 right, 4+/5 left, Hip extensors (sitting): 5/5 bilaterally, Hip adductors: 5/5 bilaterally, Hip abductors: 4+/5 bilaterally, Knee extensors: 4+/5 right, 5/5 left, Knee flexors: 4+/5 right, 5/5 left, Ankle DF: 4/5 right, 4-/5 left) 6/26/17      3. Improved SLR to >or= 60 deg bilaterally and Left =Right flexibility for improved trunk neutrality and increased ease with stooping and dynamic activities  Status at [facility]: SLR left 40, right 30 deg  Current status: met (SLR: 61 degrees right, 71 degrees left) 6/26/17      4. Improved spinal mobility and ability to reach past mid shin for increased ease with all ADL  Status at [facility]: marked HS flexibility deficit and FFD 8 inches with spinal FLex in standing  Current status: met (finger tip to floor reach 14 cm) 6/26/17      5.  Patient reports reduction in LB and LE pain to <or= 3/10 with ADL and community ambulation  Status at [facility]: pain ranging from 5-10/10  Current status: progressing (pain 7/10 at worst) 6/26/17    PLAN  []  Upgrade activities as tolerated     [x]  Continue plan of care  []  Update interventions per flow sheet       []  Discharge due to:_  [] Other:_      Arcenio Erm, PT 7/17/2017  3:52 PM    Future Appointments  Date Time Provider Shilo Monica   7/18/2017 4:00 PM Nohemy Napier, GRAYSON [facility]   7/20/2017 4:00 PM Kanwal Catalan [facility]   7/25/2017 4:00 PM Wilfrido DAVIS PT [facility]   7/26/2017 4:00 PM Kanwal Vides PT [facility]   8/23/2017 4:00 PM Jennifer Jerry MD [facility]

## 2017-07-18 NOTE — PROGRESS NOTES
PT DAILY TREATMENT NOTE     Patient Name: Med Serna  Date:2017  : 1950  [x]  Patient  Verified  Payor:  / Plan: [facility]  RETIREES AND DEPENDENTS / Product Type:  /    In time:355  Out time:450  Total Treatment Time (min): 54  Visit #: 10 of 12    Treatment Area: Spondylolisthesis, lumbar region [M43.16]    SUBJECTIVE  Pain Level (0-10 scale): 5/10  Left knee, 3/10 back  Any medication changes, allergies to medications, adverse drug reactions, diagnosis change, or new procedure performed?: [x] No    [] Yes (see summary sheet for update)  Subjective functional status/changes:   [] No changes reported  Reports overall feeling better    OBJECTIVE    Modality rationale:    Min Type Additional Details    [] Estim:  []Unatt       []IFC  []Premod                        []Other:  []w/ice   []w/heat  Position:  Location:    [] Estim: []Att    []TENS instruct  []NMES                    []Other:  []w/US   []w/ice   []w/heat  Position:  Location:    []  Traction: [] Cervical       []Lumbar                       [] Prone          []Supine                       []Intermittent   []Continuous Lbs:  [] before manual  [] after manual    []  Ultrasound: []Continuous   [] Pulsed                           []1MHz   []3MHz W/cm2:  Location:    []  Iontophoresis with dexamethasone         Location: [] Take home patch   [] In clinic    []  Ice     []  heat  []  Ice massage  []  Laser   []  Anodyne Position:  Location:    []  Laser with stim  []  Other:  Position:  Location:    []  Vasopneumatic Device Pressure:       [] lo [] med [] hi   Temperature: [] lo [] med [] hi   [] Skin assessment post-treatment:  []intact []redness- no adverse reaction    []redness - adverse reaction:      min []Eval                  [x]Re-Eval       30 min Therapeutic Exercise:  [x] See flow sheet :flexibility, Right LE strengthening        min Therapeutic Activity:  []  See flow sheet :        15 min Neuromuscular Re-education:  [x]  See flow sheet :TERRELL for postural alignment, core strengthening   Rationale: increase strength, improve coordination and increase proprioception  to improve the patients ability to tolerate positions and maintain innominate alignment. 10 min Manual Therapy:manual mobs/distraction right hip, MWM          min Gait Training:  ___ feet with ___ device on level surfaces with ___ level of assist   Rationale: With   [] TE   [] TA   [] neuro   [] other: Patient Education: [x] Review HEP    [] Progressed/Changed HEP based on:   [] positioning   [] body mechanics   [] transfers   [] heat/ice application    [] other:      Other Objective/Functional Measures:   Improved hip mobility right but continues with small stride/step length due to hypomobility    Pain Level (0-10 scale) post treatment: 5/10 left LE, 3/10 back    ASSESSMENT/Changes in Function:    Pt reports improvement in function, flexibility, and ease of movement, but left LE pain continues to be persistent. Pt displayed anteriorly rotated right innominate-and right innominate upslip that were correctable using METs, but pt displays unstable innominates and required a subsequent correction today. Both corrections resulted in an immediate reduction of LBP and a sensation of symmetry of gait. Pt displayed improved hamstring flexibility and improving strength of bilateral LEs (LEs' strength grossly 4/5 to 5/5 right LE and 4-/5 t 5/5 left LE). Pt would benefit from additional skilled PT to address pelvis instability and improve strength of back and LEs. Patient will continue to benefit from skilled PT services to modify and progress therapeutic interventions, address functional mobility deficits, address ROM deficits and address strength deficits to attain remaining goals.      [x]  See Plan of Care  []  See progress note/recertification  []  See Discharge Summary         Progress towards goals / Updated goals:  Short Term Goals: To be accomplished in 3 weeks:  1. Improved postural alignment and awareness to allow self correction during ADL  Status at Eval: postural training initiated  Current status: progressing (pt requires verbal cues to stand up straight during ambulation) 6/26/17      2. Patient reports reduction in pain to <or= 5/10 with ADL and ambulatory activities  Status at Eval: pain ranging from 5-10/10  Current status: progressing (pain 7/10 at worst) 6/26/17      Long Term Goals: To be accomplished in 6 weeks:  1. Improved FOTO score to >or= 65/100 as evidence of improved function with less pain  Status at Eval: FOTO 56/100  Current status: not met (FOTO: 50/100) 6/21/17      2. Improved proximal hip strength bilaterally to >or= 4-/5 for improved gait pattern and increased ease with dynamic ADL  Status at Eval: Glut max MMT right 2/5, left 3/5  Current status: met (Strength hip flexors: 4/5 right, 4+/5 left, Hip extensors (sitting): 5/5 bilaterally, Hip adductors: 5/5 bilaterally, Hip abductors: 4+/5 bilaterally, Knee extensors: 4+/5 right, 5/5 left, Knee flexors: 4+/5 right, 5/5 left, Ankle DF: 4/5 right, 4-/5 left) 6/26/17      3. Improved SLR to >or= 60 deg bilaterally and Left =Right flexibility for improved trunk neutrality and increased ease with stooping and dynamic activities  Status at Eval: SLR left 40, right 30 deg  Current status: met (SLR: 61 degrees right, 71 degrees left) 6/26/17      4. Improved spinal mobility and ability to reach past mid shin for increased ease with all ADL  Status at Eval: marked HS flexibility deficit and FFD 8 inches with spinal FLex in standing  Current status: met (finger tip to floor reach 14 cm) 6/26/17      5.  Patient reports reduction in LB and LE pain to <or= 3/10 with ADL and community ambulation  Status at Eval: pain ranging from 5-10/10  Current status: progressing (pain 7/10 at worst) 6/26/17    PLAN  []  Upgrade activities as tolerated     [x]  Continue plan of care  []  Update interventions per flow sheet       []  Discharge due to:_  []  Other:_      Jarrett Michael, PT 7/17/2017  3:52 PM    Future Appointments  Date Time Provider Shilo Anderson   7/18/2017 4:00 PM Alberta Denver, GRAYSON [facility]   7/20/2017 4:00 PM Kanwal Massey [facility]   7/25/2017 4:00 PM Johnson Gold V PT [facility]   7/26/2017 4:00 PM Kanwal dupont PT [facility]   8/23/2017 4:00 PM Christian Presley MD [facility]

## 2017-07-18 NOTE — PROGRESS NOTES
PT DAILY TREATMENT NOTE     Patient Name: Bola Solis  Date:2017  : 1950  [x]  Patient  Verified  Payor:  / Plan: [facility]  RETIREES AND DEPENDENTS / Product Type:  /    In time:3:55  Out time:4:55  Total Treatment Time (min): 60   Visit #: 10 of 12    Treatment Area: Spondylolisthesis, lumbar region [M43.16]    SUBJECTIVE  Pain Level (0-10 scale): 6/10  Any medication changes, allergies to medications, adverse drug reactions, diagnosis change, or new procedure performed?: [x] No    [] Yes (see summary sheet for update)  Subjective functional status/changes:   [] No changes reported  \"I'm doing 60-70% of my normal activities. Pain 7-8/10 at worst since last treatment. \"    OBJECTIVE    Modality rationale:    Min Type Additional Details    [] Estim:  []Unatt       []IFC  []Premod                        []Other:  []w/ice   []w/heat  Position:  Location:    [] Estim: []Att    []TENS instruct  []NMES                    []Other:  []w/US   []w/ice   []w/heat  Position:  Location:    []  Traction: [] Cervical       []Lumbar                       [] Prone          []Supine                       []Intermittent   []Continuous Lbs:  [] before manual  [] after manual    []  Ultrasound: []Continuous   [] Pulsed                           []1MHz   []3MHz W/cm2:  Location:    []  Iontophoresis with dexamethasone         Location: [] Take home patch   [] In clinic    []  Ice     []  heat  []  Ice massage  []  Laser   []  Anodyne Position:  Location:    []  Laser with stim  []  Other:  Position:  Location:    []  Vasopneumatic Device Pressure:       [] lo [] med [] hi   Temperature: [] lo [] med [] hi   [] Skin assessment post-treatment:  []intact []redness- no adverse reaction    []redness - adverse reaction:      min []Eval                  []Re-Eval       36 min Therapeutic Exercise:  [x] See flow sheet :  Added SKTC, discussed positioning and modification of activities; assessed strength and goals   Rationale: increase ROM, increase strength and decrease pain to improve the patients ability to tolerate positions and ADLs. min Therapeutic Activity:  []  See flow sheet :        24 min Neuromuscular Re-education:  []  See flow sheet :   Rationale: increase strength, improve coordination and increase proprioception  to improve the patients ability to tolerate positions and maintain innominate alignment. min Manual Therapy:          min Gait Training:  ___ feet with ___ device on level surfaces with ___ level of assist   Rationale: With   [] TE   [] TA   [] neuro   [] other: Patient Education: [x] Review HEP    [] Progressed/Changed HEP based on:   [] positioning   [] body mechanics   [] transfers   [] heat/ice application    [] other:      Other Objective/Functional Measures:   Hamstring flexibility: 39 degrees right, 38 degrees left  Strength hip flexors: 4+/5 right, 5/5 left (improved 1/3 grade bilaterally), Hip extensors (sitting): 5/5 bilaterally, Hip adductors: 5/5 bilaterally, Hip abductors: 5/5 bilaterally (improved 1/3 grade bilaterally), Knee extensors: 5/5 right (improved 1/3 grade), 5/5 left, Knee flexors: 4+/5 right, 5/5 left, Ankle DF: 4/5 right, 4/5 left (improved 1/3 grade)  Innominates aligned    Pain Level (0-10 scale) post treatment: 4/10    ASSESSMENT/Changes in Function:    Pt progressing well towards all goals, but he continues to display tight hamstrings and no significant changes in pain reports. Pain can elevate to as high as 7-8/10 at worst during ADLs. Patient will continue to benefit from skilled PT services to modify and progress therapeutic interventions, address functional mobility deficits, address ROM deficits and address strength deficits to attain remaining goals.      []  See Plan of Care  []  See progress note/recertification  []  See Discharge Summary         Progress towards goals / Updated goals:  Short Term Goals: To be accomplished in 3 weeks:  1. Improved postural alignment and awareness to allow self correction during ADL  Status at Eval: postural training initiated  Current status: progressing (pt requires verbal cues to stand up straight during ambulation) 6/26/17      2. Patient reports reduction in pain to <or= 5/10 with ADL and ambulatory activities  Status at Eval: pain ranging from 5-10/10  Current status: progressing (pain 7/10 at worst) 6/26/17  Current status: no change (pain 7-8/10 at worst) 7/18/17      Long Term Goals: To be accomplished in 6 weeks:  1. Improved FOTO score to >or= 65/100 as evidence of improved function with less pain  Status at Eval: FOTO 56/100  Status at last progress note (6/26/17): not met (FOTO: 50/100)  Current status: not met (FOTO: 54/100) 7/18/17      2. Improved proximal hip strength bilaterally to >or= 4-/5 for improved gait pattern and increased ease with dynamic ADL  Status at Eval: Glut max MMT right 2/5, left 3/5  Status at last progress note (6/26/17): met (Strength hip flexors: 4/5 right, 4+/5 left, Hip extensors (sitting): 5/5 bilaterally, Hip adductors: 5/5 bilaterally, Hip abductors: 4+/5 bilaterally, Knee extensors: 4+/5 right, 5/5 left, Knee flexors: 4+/5 right, 5/5 left, Ankle DF: 4/5 right, 4-/5 left) 6/26/17  Current status: met (Strength hip flexors: 4+/5 right, 5/5 left (improved 1/3 grade bilaterally), Hip extensors (sitting): 5/5 bilaterally, Hip adductors: 5/5 bilaterally, Hip abductors: 5/5 bilaterally (improved 1/3 grade bilaterally), Knee extensors: 5/5 right (improved 1/3 grade), 5/5 left, Knee flexors: 4+/5 right, 5/5 left, Ankle DF: 4/5 right, 4/5 left (improved 1/3 grade) 7/18/17      3.  Improved SLR to >or= 60 deg bilaterally and Left =Right flexibility for improved trunk neutrality and increased ease with stooping and dynamic activities  Status at Eval: SLR left 40, right 30 deg  Status at last progress note (6/26/17): met (SLR: 61 degrees right, 71 degrees left)  Current status: met (SLR: 65 degrees. right, 70 degrees left) 7/18/17      4. Improved spinal mobility and ability to reach past mid shin for increased ease with all ADL  Status at Eval: marked HS flexibility deficit and FFD 8 inches with spinal FLex in standing  Status at last progress note (6/26/17): met (finger tip to floor reach 14 cm)  Current status: met (finger tip to floor reach:16.5 cm) 7/18/17      5.  Patient reports reduction in LB and LE pain to <or= 3/10 with ADL and community ambulation  Status at Eval: pain ranging from 5-10/10  Status at last progress note (6/26/17): progressing (pain 7/10 at worst)  Current status: no change (pain 7-8/10 at worst) 7/18/17    PLAN  []  Upgrade activities as tolerated     [x]  Continue plan of care  []  Update interventions per flow sheet       []  Discharge due to:_  []  Other:_      Manish Smith, GRAYSON 7/18/2017  3:56 PM    Future Appointments  Date Time Provider Shilo Anderson   7/18/2017 4:00 PM Joan Hunt V, PT [facility]   7/20/2017 4:00 PM Viridiana Whitaker, PT [facility]   7/25/2017 4:00 PM Joan Hunt V, PT [facility]   7/26/2017 4:00 PM Kanwal dupont, PT [facility]   8/23/2017 4:00 PM Cherie Bhakta MD [facility]

## 2017-07-20 ENCOUNTER — HOSPITAL ENCOUNTER (OUTPATIENT)
Dept: PHYSICAL THERAPY | Age: 67
Discharge: HOME | End: 2017-07-20
Payer: OTHER GOVERNMENT

## 2017-07-20 PROCEDURE — 97110 THERAPEUTIC EXERCISES: CPT

## 2017-07-20 PROCEDURE — 97530 THERAPEUTIC ACTIVITIES: CPT

## 2017-07-20 PROCEDURE — 97112 NEUROMUSCULAR REEDUCATION: CPT

## 2017-07-20 NOTE — PROGRESS NOTES
PT DAILY TREATMENT NOTE     Patient Name: Bola Solis  Date:2017  : 1950  [x]  Patient  Verified  Payor:  / Plan: [facility]  RETIREES AND DEPENDENTS / Product Type:  /    In time:3:55  Out time:4:55  Total Treatment Time (min): 60   Visit #: 13 of 20    Treatment Area: Spondylolisthesis, lumbar region [M43.16]    SUBJECTIVE  Pain Level (0-10 scale): 6/10  Any medication changes, allergies to medications, adverse drug reactions, diagnosis change, or new procedure performed?: [x] No    [] Yes (see summary sheet for update)  Subjective functional status/changes:   [] No changes reported  \"I have improved a lot. Less back pain and feeling stronger. I am able to stand longer without looking for a wall to find support. Pain ranging from 0/10 at night/sitting up to 6-7/10 with walking/standing. Pain in LB, left knee and occasionally in right hip.   Epidural next Thursday  OBJECTIVE    Modality rationale:    Min Type Additional Details    [] Estim:  []Unatt       []IFC  []Premod                        []Other:  []w/ice   []w/heat  Position:  Location:    [] Estim: []Att    []TENS instruct  []NMES                    []Other:  []w/US   []w/ice   []w/heat  Position:  Location:    []  Traction: [] Cervical       []Lumbar                       [] Prone          []Supine                       []Intermittent   []Continuous Lbs:  [] before manual  [] after manual    []  Ultrasound: []Continuous   [] Pulsed                           []1MHz   []3MHz W/cm2:  Location:    []  Iontophoresis with dexamethasone         Location: [] Take home patch   [] In clinic    []  Ice     []  heat  []  Ice massage  []  Laser   []  Anodyne Position:  Location:    []  Laser with stim  []  Other:  Position:  Location:    []  Vasopneumatic Device Pressure:       [] lo [] med [] hi   Temperature: [] lo [] med [] hi   [] Skin assessment post-treatment:  []intact []redness- no adverse reaction    []redness - adverse reaction:      min []Eval                  []Re-Eval       15 min Therapeutic Exercise:  [x] See flow sheet :Glut max strength     Rationale: increase ROM, increase strength and decrease pain to improve the patients ability to tolerate positions and ADLs. 15 min Therapeutic Activity:  [x]  See flow sheet :Reevaluation        15 min Neuromuscular Re-education:  []  See flow sheet :   Rationale: increase strength, improve coordination and increase proprioception  to improve the patients ability to tolerate positions and maintain innominate alignment. min Manual Therapy:          min Gait Training:  ___ feet with ___ device on level surfaces with ___ level of assist   Rationale: With   [] TE   [] TA   [] neuro   [] other: Patient Education: [x] Review HEP    [] Progressed/Changed HEP based on:   [] positioning   [] body mechanics   [] transfers   [] heat/ice application    [] other:      Other Objective/Functional Measures:   Glut max tested functionally in prone: weakness at end range Hip Ext prema right LE, right 3-/5, left 4/5  Tends to overcompensate with LS whenever training hip Ext    Pain Level (0-10 scale) post treatment: 4/10    ASSESSMENT/Changes in Function:    Pt progressing well towards all goals, but he continues to display tight hamstrings and no significant changes in pain reports. Pain can elevate to as high as 7-8/10 at worst during ADLs. Patient will continue to benefit from skilled PT services to modify and progress therapeutic interventions, address functional mobility deficits, address ROM deficits and address strength deficits to attain remaining goals. [x]  See Plan of Care  [x]  See progress note/recertification  []  See Discharge Summary         Progress towards goals / Updated goals:Mr. Jennifer Osullivan has been showing some improvement in regards to strength and postural alignment but continues with marked deficits in flexibility, pain up to 7/10 and altered gait pattern. He has met 3 of 7 goals . Recommend continuation of therapy for 4 weeks to address remaining goals. Short Term Goals: To be accomplished in 3 weeks:  1. Improved postural alignment and awareness to allow self correction during ADL  Status at [facility]: postural training initiated  Current status: progressing (pt requires verbal cues to stand up straight during ambulation) 6/26/17  Current status: occasionally wearing back brace, able to self correct posture for pain control, goal met      2. Patient reports reduction in pain to <or= 5/10 with ADL and ambulatory activities  Status at [facility]: pain ranging from 5-10/10  Current status: no change (pain 7-8/10 at worst) 7/18/17      Long Term Goals: To be accomplished in 6 weeks:  1. Improved FOTO score to >or= 65/100 as evidence of improved function with less pain  Status at [facility]: FOTO 56/100  Status at last progress note (6/26/17): not met (FOTO: 50/100)  Current status: not met (FOTO: 54/100) 7/18/17      2.  Improved proximal hip strength bilaterally to >or= 4-/5 for improved gait pattern and increased ease with dynamic ADL  Status at [facility]: Glut max MMT right 2/5, left 3/5  Status at last progress note (6/26/17): met (Strength hip flexors: 4/5 right, 4+/5 left, Hip extensors (sitting): 5/5 bilaterally, Hip adductors: 5/5 bilaterally, Hip abductors: 4+/5 bilaterally, Knee extensors: 4+/5 right, 5/5 left, Knee flexors: 4+/5 right, 5/5 left, Ankle DF: 4/5 right, 4-/5 left) 6/26/17  Current status: met (Strength hip flexors: 4+/5 right, 5/5 left (improved 1/3 grade bilaterally), Hip extensors (sitting): 5/5 bilaterally, Hip adductors: 5/5 bilaterally, Hip abductors: 5/5 bilaterally (improved 1/3 grade bilaterally), Knee extensors: 5/5 right (improved 1/3 grade), 5/5 left, Knee flexors: 4+/5 right, 5/5 left, Ankle DF: 4/5 right, 4/5 left (improved 1/3 grade) 7/18/17  Goal met in regards to isometric strength testing though patient continues with functional strength deficits at end range Ext as follows: right 3-/5, left 4/5, progressing      3. Improved SLR to >or= 60 deg bilaterally and Left =Right flexibility for improved trunk neutrality and increased ease with stooping and dynamic activities  Status at Eval: SLR left 40, right 30 deg  Status at last progress note (6/26/17): met (SLR: 61 degrees right, 71 degrees left)  Current status: met (SLR: 65 degrees. right, 70 degrees left) 7/18/17      4. Improved spinal mobility and ability to reach past mid shin for increased ease with all ADL  Status at Eval: marked HS flexibility deficit and FFD 8 inches with spinal FLex in standing  Status at last progress note (6/26/17): met (finger tip to floor reach 14 cm)  Current status: met (finger tip to floor reach:16.5 cm) 7/18/17      5.  Patient reports reduction in LB and LE pain to <or= 3/10 with ADL and community ambulation  Status at Eval: pain ranging from 5-10/10  Status at last progress note (6/26/17): progressing (pain 7/10 at worst)  Current status: no change (pain 7-8/10 at worst) 7/18/17    PLAN  []  Upgrade activities as tolerated     [x]  Continue plan of care for 4 weeks  []  Update interventions per flow sheet       []  Discharge due to:_  []  Other:_      Khalida Albarran PT 7/20/2017  3:56 PM    Future Appointments  Date Time Provider Shilo Anderson   7/20/2017 4:00 PM Juvenal Duran [facility]   7/25/2017 4:00 PM Benny DAVIS PT [facility]   7/26/2017 4:00 PM GRAYSON Galicia[facility]PRHONDA [facility]   8/23/2017 4:00 PM Renate Osler, MD [place]

## 2017-07-20 NOTE — PROGRESS NOTES
[facility] in [address] [place], [address] [place]  Phone: [phone]      Fax:  [phone]    Progress Note    Patient name: Lacey Rosas Start of Care: 17   Referral source: Yifan Peguero : 1950   Medical/Treatment Diagnosis: Spondylolisthesis, lumbar region [M43.16] Onset Date: chronic with recent exacerbation   Prior Hospitalization: see medical history Provider#: [id number]   Medications: Verified on Patient Summary List     Comorbidities: CS fusion, right hip dysfunction   Prior Level of Function: Full function with intermittent pain    Visits from Start of Care: 13    Missed Visits: 0        Progress towards goals / Updated goals:Mr. Marty Almonte has been showing some improvement in regards to strength and postural alignment but continues with marked deficits in flexibility, pain up to 7/10 and altered gait pattern. He has met 3 of 7 goals . Recommend continuation of therapy for 4 weeks to address remaining goals. Short Term Goals: To be accomplished in 3 weeks:  1. Improved postural alignment and awareness to allow self correction during ADL  Status at [facility]: postural training initiated  Current status: progressing (pt requires verbal cues to stand up straight during ambulation) 17  Current status: occasionally wearing back brace, able to self correct posture for pain control, goal met      2. Patient reports reduction in pain to <or= 5/10 with ADL and ambulatory activities  Status at [facility]: pain ranging from 5-10/10  Current status: no change (pain 7-8/10 at worst) 17      Long Term Goals: To be accomplished in 6 weeks:  1. Improved FOTO score to >or= 65/100 as evidence of improved function with less pain  Status at [facility]: FOTO 56/100  Status at last progress note (17): not met (FOTO: 50/100)  Current status: not met (FOTO: 54/100) 17      2.  Improved proximal hip strength bilaterally to >or= 4-/5 for improved gait pattern and increased ease with dynamic ADL  Status at Eval: Glut max MMT right 2/5, left 3/5  Status at last progress note (6/26/17): met (Strength hip flexors: 4/5 right, 4+/5 left, Hip extensors (sitting): 5/5 bilaterally, Hip adductors: 5/5 bilaterally, Hip abductors: 4+/5 bilaterally, Knee extensors: 4+/5 right, 5/5 left, Knee flexors: 4+/5 right, 5/5 left, Ankle DF: 4/5 right, 4-/5 left) 6/26/17  Current status: met (Strength hip flexors: 4+/5 right, 5/5 left (improved 1/3 grade bilaterally), Hip extensors (sitting): 5/5 bilaterally, Hip adductors: 5/5 bilaterally, Hip abductors: 5/5 bilaterally (improved 1/3 grade bilaterally), Knee extensors: 5/5 right (improved 1/3 grade), 5/5 left, Knee flexors: 4+/5 right, 5/5 left, Ankle DF: 4/5 right, 4/5 left (improved 1/3 grade) 7/18/17  Goal met in regards to isometric strength testing though patient continues with functional strength deficits at end range Ext as follows: right 3-/5, left 4/5, progressing      3. Improved SLR to >or= 60 deg bilaterally and Left =Right flexibility for improved trunk neutrality and increased ease with stooping and dynamic activities  Status at Eval: SLR left 40, right 30 deg  Status at last progress note (6/26/17): met (SLR: 61 degrees right, 71 degrees left)  Current status: met (SLR: 65 degrees. right, 70 degrees left) 7/18/17      4. Improved spinal mobility and ability to reach past mid shin for increased ease with all ADL  Status at Eval: marked HS flexibility deficit and FFD 8 inches with spinal FLex in standing  Status at last progress note (6/26/17): met (finger tip to floor reach 14 cm)  Current status: met (finger tip to floor reach:16.5 cm) 7/18/17      5.  Patient reports reduction in LB and LE pain to <or= 3/10 with ADL and community ambulation  Status at Eval: pain ranging from 5-10/10  Status at last progress note (6/26/17): progressing (pain 7/10 at worst)  Current status: no change (pain 7-8/10 at worst) 7/18/17    Key Functional Changes: improved functional strength    ASSESSMENT/RECOMMENDATIONS:  [x]Continue therapy per initial plan/protocol at a frequency of  2 x per week for 4 weeks  []Continue therapy with the following recommended changes:_____________________      _____________________________________________________________________  []Discontinue therapy progressing towards or have reached established goals  []Discontinue therapy due to lack of appreciable progress towards goals  []Discontinue therapy due to lack of attendance or compliance  []Await Physician's recommendations/decisions regarding therapy  []Other:________________________________________________________________    Thank you for this referral.   Jarred Rosas, PT 7/20/2017 4:28 PM  NOTE TO PHYSICIAN:  PLEASE COMPLETE THE ORDERS BELOW AND   FAX TO [facility]: [phone]  If you are unable to process this request in 24 hours please contact our office:   [phone]  []  I have read the above report and request that my patient continue as recommended. []  I have read the above report and request that my patient continue therapy with the following changes/special instructions:________________________________________  []I have read the above report and request that my patient be discharged from therapy.     Physicians signature: ______________________________Date: ______Time:______

## 2017-07-25 ENCOUNTER — HOSPITAL ENCOUNTER (OUTPATIENT)
Dept: PHYSICAL THERAPY | Age: 67
Discharge: HOME | End: 2017-07-25
Payer: OTHER GOVERNMENT

## 2017-07-25 PROCEDURE — 97140 MANUAL THERAPY 1/> REGIONS: CPT

## 2017-07-25 PROCEDURE — 97112 NEUROMUSCULAR REEDUCATION: CPT

## 2017-07-25 PROCEDURE — 97110 THERAPEUTIC EXERCISES: CPT

## 2017-07-25 NOTE — PROGRESS NOTES
PT DAILY TREATMENT NOTE     Patient Name: Donald Ramos  Date:2017  : 1950  [x]  Patient  Verified  Payor:  / Plan: [facility]  RETIREES AND DEPENDENTS / Product Type:  /    In time:3:55  Out time:5:00  Total Treatment Time (min): 65  Visit #: 14 of 21    Treatment Area: Spondylolisthesis, lumbar region [M43.16]    SUBJECTIVE  Pain Level (0-10 scale): 5/10  Any medication changes, allergies to medications, adverse drug reactions, diagnosis change, or new procedure performed?: [x] No    [] Yes (see summary sheet for update)  Subjective functional status/changes:   [] No changes reported  \"My leg is just not right working right today. I'm dragging it today. \"    OBJECTIVE    Modality rationale:    Min Type Additional Details    [] Estim:  []Unatt       []IFC  []Premod                        []Other:  []w/ice   []w/heat  Position:  Location:    [] Estim: []Att    []TENS instruct  []NMES                    []Other:  []w/US   []w/ice   []w/heat  Position:  Location:    []  Traction: [] Cervical       []Lumbar                       [] Prone          []Supine                       []Intermittent   []Continuous Lbs:  [] before manual  [] after manual    []  Ultrasound: []Continuous   [] Pulsed                           []1MHz   []3MHz W/cm2:  Location:    []  Iontophoresis with dexamethasone         Location: [] Take home patch   [] In clinic    []  Ice     []  heat  []  Ice massage  []  Laser   []  Anodyne Position:  Location:    []  Laser with stim  []  Other:  Position:  Location:    []  Vasopneumatic Device Pressure:       [] lo [] med [] hi   Temperature: [] lo [] med [] hi   [] Skin assessment post-treatment:  []intact []redness- no adverse reaction    []redness - adverse reaction:      min []Eval                  []Re-Eval       13 min Therapeutic Exercise:  [] See flow sheet :   Rationale: increase ROM, increase strength and decrease pain to improve the patients ability to tolerate positions and ADLs. min Therapeutic Activity:  []  See flow sheet :        37 min Neuromuscular Re-education:  [x]  See flow sheet :   Rationale: increase strength, improve coordination and increase proprioception  to improve the patients ability to tolerate positions and maintain innominate alignment. 15 min Manual Therapy:    Self METs to cotrrect anteriorly rotated right innominate (x3) (successful)   Rationale: decrease pain and correct joint alignment and joint mechanics to tolerate positions and improve gait. min Gait Training:  ___ feet with ___ device on level surfaces with ___ level of assist   Rationale: With   [] TE   [] TA   [] neuro   [] other: Patient Education: [x] Review HEP    [] Progressed/Changed HEP based on:   [] positioning   [] body mechanics   [] transfers   [] heat/ice application    [] other:      Other Objective/Functional Measures:   Pt unable to strike heel on right during ambulation on treadmill  right LE shorter in supine and shortens more in sitting-   right standing flexion test      Pain Level (0-10 scale) post treatment: 3/10    ASSESSMENT/Changes in Function:    Pt displayed an anteriorly rotated right innominate that was the same as innominate malalignment on 6/26/17. Pt reported improved ambulation and less foot drag after innominate malalignment was corrected today. Pt reported being challenged in the glutes by half prone donkey kicks today. Patient will continue to benefit from skilled PT services to modify and progress therapeutic interventions, address functional mobility deficits, address ROM deficits and address strength deficits to attain remaining goals. []  See Plan of Care  []  See progress note/recertification  []  See Discharge Summary         Progress towards goals / Updated goals:  Short Term Goals: To be accomplished in 3 weeks:  1.  Improved postural alignment and awareness to allow self correction during ADL  Status at Eval: postural training initiated  Status at last progress note (7/20/17): met (occasionally wearing back brace, able to self correct posture for pain control)  Current status: not reassessed      2. Patient reports reduction in pain to <or= 5/10 with ADL and ambulatory activities  Status at Eval: pain ranging from 5-10/10  Status at last progress note (7/20/17): no change (pain 7-8/10 at worst)  Current status: not reassessed      Long Term Goals: To be accomplished in 6 weeks:  1. Improved FOTO score to >or= 65/100 as evidence of improved function with less pain  Status at Eval: FOTO 56/100  Status at last progress note (7/20/17): not met (FOTO: 54/100)  Current status: not reassessed       2. Improved proximal hip strength bilaterally to >or= 4-/5 for improved gait pattern and increased ease with dynamic ADL  Status at Eval: Glut max MMT right 2/5, left 3/5  Status at last progress note (7/20/17): Goal met in regards to isometric strength testing though patient continues with functional strength deficits at end range Ext as follows: right 3-/5, left 4/5; (isometric strength hip flexors: 4+/5 right, 5/5 left (improved 1/3 grade bilaterally), Hip extensors (sitting): 5/5 bilaterally, Hip adductors: 5/5 bilaterally, Hip abductors: 5/5 bilaterally (improved 1/3 grade bilaterally), Knee extensors: 5/5 right (improved 1/3 grade), 5/5 left, Knee flexors: 4+/5 right, 5/5 left, Ankle DF: 4/5 right, 4/5 left (improved 1/3 grade)  Current status: not reassessed        3. Improved SLR to >or= 60 deg bilaterally and Left =Right flexibility for improved trunk neutrality and increased ease with stooping and dynamic activities  Status at Eval: SLR left 40, right 30 deg  Status at last progress note (7/20/17): met (SLR: 65 degrees. right, 70 degrees left)  Current status: not reassessed       4.  Improved spinal mobility and ability to reach past mid shin for increased ease with all ADL  Status at Eval: marked HS flexibility deficit and FFD 8 inches with spinal FLex in standing  Status at last progress note (7/20/17): met (finger tip to floor reach:16.5 cm)  Current status: not reassessed       5.  Patient reports reduction in LB and LE pain to <or= 3/10 with ADL and community ambulation  Status at Eval: pain ranging from 5-10/10  Status at last progress note (7/20/17): no change (pain 7-8/10 at worst)  Current status: not reassessed    PLAN  [x]  Upgrade activities as tolerated     [x]  Continue plan of care  []  Update interventions per flow sheet       []  Discharge due to:_  []  Other:_      Manjinder Ortiz, PT 7/25/2017  4:01 PM    Future Appointments  Date Time Provider Shilo Anderson   7/26/2017 4:00 PM Juvenal Landin [facility]   7/31/2017 4:00 PM Manjinder Ortiz PT [facility]   8/1/2017 4:00 PM Nelly Negron V PT [facility]   8/7/2017 4:00 PM Nelly Negron V, PT [facility]   8/10/2017 4:00 PM Kanwal Cary [facility]   8/15/2017 4:00 PM Manjinder Ortiz PT [facility]   8/17/2017 4:00 PM [facility]   8/22/2017 4:00 PM Nelly Negron V PT [facility]   8/23/2017 4:00 PM MD Jacky Villa Venu

## 2017-07-26 ENCOUNTER — HOSPITAL ENCOUNTER (OUTPATIENT)
Dept: PHYSICAL THERAPY | Age: 67
Discharge: HOME | End: 2017-07-26
Payer: OTHER GOVERNMENT

## 2017-07-26 PROCEDURE — 97140 MANUAL THERAPY 1/> REGIONS: CPT

## 2017-07-26 PROCEDURE — 97110 THERAPEUTIC EXERCISES: CPT

## 2017-07-26 NOTE — PROGRESS NOTES
PT DAILY TREATMENT NOTE     Patient Name: Salinas Velasco  Date:2017  : 1950  [x]  Patient  Verified  Payor:  / Plan: [facility]  RETIREES AND DEPENDENTS / Product Type:  /    In time:405  Out time:450  Total Treatment Time (min): 45  Visit #: 15 of 21    Treatment Area: Spondylolisthesis, lumbar region [M43.16]    SUBJECTIVE  Pain Level (0-10 scale): 5/10 LB and left lat/ant thigh and shin  Any medication changes, allergies to medications, adverse drug reactions, diagnosis change, or new procedure performed?: [x] No    [] Yes (see summary sheet for update)  Subjective functional status/changes:   [] No changes reported  \"I am having an epidural tomorrow. Hope it helps.  Feeling good after each ex session and especially the morning after \"    OBJECTIVE    Modality rationale:    Min Type Additional Details    [] Estim:  []Unatt       []IFC  []Premod                        []Other:  []w/ice   []w/heat  Position:  Location:    [] Estim: []Att    []TENS instruct  []NMES                    []Other:  []w/US   []w/ice   []w/heat  Position:  Location:    []  Traction: [] Cervical       []Lumbar                       [] Prone          []Supine                       []Intermittent   []Continuous Lbs:  [] before manual  [] after manual    []  Ultrasound: []Continuous   [] Pulsed                           []1MHz   []3MHz W/cm2:  Location:    []  Iontophoresis with dexamethasone         Location: [] Take home patch   [] In clinic    []  Ice     []  heat  []  Ice massage  []  Laser   []  Anodyne Position:  Location:    []  Laser with stim  []  Other:  Position:  Location:    []  Vasopneumatic Device Pressure:       [] lo [] med [] hi   Temperature: [] lo [] med [] hi   [] Skin assessment post-treatment:  []intact []redness- no adverse reaction    []redness - adverse reaction:      min []Eval                  []Re-Eval       30 min Therapeutic Exercise:  [x] See flow sheet :focus on hip flex stretching, Glut activation and neutral spine   Rationale: increase ROM, increase strength and decrease pain to improve the patients ability to tolerate positions and ADLs. min Therapeutic Activity:  []  See flow sheet :         min Neuromuscular Re-education:  [x]  See flow sheet :       15 min Manual Therapy:    LS traction, trial of 1/2 inch heel lift left for better pelvic symmetry and reduction of LS torque, left LE distraction   Rationale: decrease pain and correct joint alignment and joint mechanics to tolerate positions and improve gait. min Gait Training:  ___ feet with ___ device on level surfaces with ___ level of assist   Rationale: With   [] TE   [] TA   [] neuro   [] other: Patient Education: [x] Review HEP    [] Progressed/Changed HEP based on:   [] positioning   [] body mechanics   [] transfers   [] heat/ice application    [] other:      Other Objective/Functional Measures:   Marked elevation of right iliac crest in standing position  1/2\" heel raise for left LE      Pain Level (0-10 scale) post treatment: 2/10 left LE and LB in prone during TE    ASSESSMENT/Changes in Function:   Reduction in burning sciatic left LE pain with traction and heel raise  Patient will continue to benefit from skilled PT services to modify and progress therapeutic interventions, address functional mobility deficits, address ROM deficits and address strength deficits to attain remaining goals. [x]  See Plan of Care  []  See progress note/recertification  []  See Discharge Summary         Progress towards goals / Updated goals:  Short Term Goals: To be accomplished in 3 weeks:  1. Improved postural alignment and awareness to allow self correction during ADL  Status at al: postural training initiated  Status at last progress note (7/20/17): met (occasionally wearing back brace, able to self correct posture for pain control)  Current status: not reassessed      2.  Patient reports reduction in pain to <or= 5/10 with ADL and ambulatory activities  Status at Eval: pain ranging from 5-10/10  Status at last progress note (7/20/17): no change (pain 7-8/10 at worst)  Current status: not reassessed      Long Term Goals: To be accomplished in 6 weeks:  1. Improved FOTO score to >or= 65/100 as evidence of improved function with less pain  Status at Eval: FOTO 56/100  Status at last progress note (7/20/17): not met (FOTO: 54/100)  Current status: not reassessed       2. Improved proximal hip strength bilaterally to >or= 4-/5 for improved gait pattern and increased ease with dynamic ADL  Status at Eval: Glut max MMT right 2/5, left 3/5  Status at last progress note (7/20/17): Goal met in regards to isometric strength testing though patient continues with functional strength deficits at end range Ext as follows: right 3-/5, left 4/5; (isometric strength hip flexors: 4+/5 right, 5/5 left (improved 1/3 grade bilaterally), Hip extensors (sitting): 5/5 bilaterally, Hip adductors: 5/5 bilaterally, Hip abductors: 5/5 bilaterally (improved 1/3 grade bilaterally), Knee extensors: 5/5 right (improved 1/3 grade), 5/5 left, Knee flexors: 4+/5 right, 5/5 left, Ankle DF: 4/5 right, 4/5 left (improved 1/3 grade)  Current status: not reassessed        3. Improved SLR to >or= 60 deg bilaterally and Left =Right flexibility for improved trunk neutrality and increased ease with stooping and dynamic activities  Status at Eval: SLR left 40, right 30 deg  Status at last progress note (7/20/17): met (SLR: 65 degrees. right, 70 degrees left)  Current status: not reassessed       4. Improved spinal mobility and ability to reach past mid shin for increased ease with all ADL  Status at Eval: marked HS flexibility deficit and FFD 8 inches with spinal FLex in standing  Status at last progress note (7/20/17): met (finger tip to floor reach:16.5 cm)  Current status: not reassessed       5.  Patient reports reduction in LB and LE pain to <or= 3/10 with ADL and community ambulation  Status at Eval: pain ranging from 5-10/10  Status at last progress note (7/20/17): no change (pain 7-8/10 at worst)  Current status: not reassessed    PLAN  [x]  Upgrade activities as tolerated     [x]  Continue plan of care, check tolerance to heel lift left LE  []  Update interventions per flow sheet       []  Discharge due to:_  []  Other:_      Trudy Martinez, GRAYSON 7/26/2017  4:01 PM    Future Appointments  Date Time Provider Shilo Anderson   7/31/2017 4:00 PM Javi Schneider, PT [facility]   8/1/2017 4:00 PM Gabrielle Thompson V PT [facility]   8/7/2017 4:00 PM Gabrielle Thompson V, PT [facility]   8/10/2017 4:00 PM Kanwal Reis [facility]   8/15/2017 4:00 PM Javi Schneider PT [facility]   8/17/2017 4:00 PM [facility]   8/22/2017 4:00 PM Gabrielle Thompson V PT [facility]   8/23/2017 4:00 PM Anita Mejia MD [facility]

## 2017-07-27 ENCOUNTER — HOSPITAL ENCOUNTER (OUTPATIENT)
Age: 67
Setting detail: OUTPATIENT SURGERY
Discharge: HOME | End: 2017-07-27
Attending: PHYSICAL MEDICINE & REHABILITATION | Admitting: PHYSICAL MEDICINE & REHABILITATION
Payer: OTHER GOVERNMENT

## 2017-07-27 ENCOUNTER — APPOINTMENT (OUTPATIENT)
Dept: GENERAL RADIOLOGY | Age: 67
End: 2017-07-27
Attending: PHYSICAL MEDICINE & REHABILITATION
Payer: OTHER GOVERNMENT

## 2017-07-27 ENCOUNTER — APPOINTMENT (OUTPATIENT)
Dept: PHYSICAL THERAPY | Age: 67
End: 2017-07-27
Payer: OTHER GOVERNMENT

## 2017-07-27 VITALS
WEIGHT: 179 LBS | OXYGEN SATURATION: 99 % | HEIGHT: 71 IN | RESPIRATION RATE: 18 BRPM | BODY MASS INDEX: 25.06 KG/M2 | SYSTOLIC BLOOD PRESSURE: 94 MMHG | DIASTOLIC BLOOD PRESSURE: 65 MMHG | HEART RATE: 75 BPM

## 2017-07-27 PROCEDURE — 74011000250 HC RX REV CODE- 250

## 2017-07-27 PROCEDURE — 74011250636 HC RX REV CODE- 250/636

## 2017-07-27 PROCEDURE — 77030003543 HC NDL EPDRL BBMI -A: Performed by: PHYSICAL MEDICINE & REHABILITATION

## 2017-07-27 PROCEDURE — 74011250637 HC RX REV CODE- 250/637: Performed by: PHYSICAL MEDICINE & REHABILITATION

## 2017-07-27 PROCEDURE — 76010000009 HC PAIN MGT 0 TO 30 MIN PROC: Performed by: PHYSICAL MEDICINE & REHABILITATION

## 2017-07-27 PROCEDURE — 77030014124 HC TY EPDRL BBMI -A: Performed by: PHYSICAL MEDICINE & REHABILITATION

## 2017-07-27 RX ORDER — DEXTROMETHORPHAN HYDROBROMIDE, GUAIFENESIN 5; 100 MG/5ML; MG/5ML
650 LIQUID ORAL
COMMUNITY

## 2017-07-27 RX ORDER — DEXAMETHASONE SODIUM PHOSPHATE 100 MG/10ML
INJECTION INTRAMUSCULAR; INTRAVENOUS AS NEEDED
Status: DISCONTINUED | OUTPATIENT
Start: 2017-07-27 | End: 2017-07-27 | Stop reason: HOSPADM

## 2017-07-27 RX ORDER — LIDOCAINE HYDROCHLORIDE 10 MG/ML
INJECTION, SOLUTION EPIDURAL; INFILTRATION; INTRACAUDAL; PERINEURAL AS NEEDED
Status: DISCONTINUED | OUTPATIENT
Start: 2017-07-27 | End: 2017-07-27 | Stop reason: HOSPADM

## 2017-07-27 RX ORDER — DIAZEPAM 5 MG/1
5-20 TABLET ORAL ONCE
Status: COMPLETED | OUTPATIENT
Start: 2017-07-27 | End: 2017-07-27

## 2017-07-27 RX ADMIN — DIAZEPAM 5 MG: 5 TABLET ORAL at 12:16

## 2017-07-27 NOTE — DISCHARGE INSTRUCTIONS
[facility]   ([place])  Dr. Michelle Crow, Dr. George Dallas, Dr. Toshia Chapman not drive a car, operate heavy machinery or dangerous equipment for 24 hours. * Activity as tolerated; rest for the remainder of the day. * Resume pre-block medications including those for your family doctor. * Do not drink alcoholic beverages for 24 hours. Alcohol and the medications you have received may interact and cause an adverse reaction. * You may feel better this evening and worse tomorrow, as the numbing medications wears off and the steroid has yet to begin to work. After 48 hrs the steroid should begin to release bringing you relief. * You may shower this evening and remove any bandages. * Avoid hot tubs and heating pads for 24 hours. You may use cold packs on the procedure site as tolerated for the first 24 hours. * If a headache develops, drink plenty of fluids and rest.  Take over the counter medications for headache if needed. If the headache continues longer than 24 hours, call MD at the [address] [place] [address]. [phone]    * Continue taking pain medications as needed. * You may resume your regular diet if tolerated. Otherwise, start with sips of water and advance slowly. * If Diabetic: check your blood sugar three times a day for the next 3 days. If your sugar is greater than 300 call your family doctor. If your sugar is greater than 400, have someone transport you to the nearest Emergency Room. * If you experience any of the following problems, Please Call the [address] [place] [address] at [phone].         * Shortness of Breath    * Fever of 101 or higher    * Nausea / Vomiting    * Severe Headache    * Weakness or numbness in arms or legs that is not      resolving    * Prolonged increase in pain greater than 4 days      DISCHARGE SUMMARY from Nurse      PATIENT INSTRUCTIONS:    After oral sedation, for 24 hours or while taking prescription Narcotics:  · Limit your activities  · Do not drive and operate hazardous machinery  · Do not make important personal or business decisions  · Do  not drink alcoholic beverages  · If you have not urinated within 8 hours after discharge, please contact your surgeon on call. Report the following to your surgeon:  · Excessive pain, swelling, redness or odor of or around the surgical area  · Temperature over 101  · Nausea and vomiting lasting longer than 4 hours or if unable to take medications  · Any signs of decreased circulation or nerve impairment to extremity: change in color, persistent  numbness, tingling, coldness or increase pain  · Any questions            What to do at Home:  Recommended activity: Activity as tolerated, NO DRIVING FOR 12 Hours post injection          *  Please give a list of your current medications to your Primary Care Provider. *  Please update this list whenever your medications are discontinued, doses are      changed, or new medications (including over-the-counter products) are added. *  Please carry medication information at all times in case of emergency situations. These are general instructions for a healthy lifestyle:    No smoking/ No tobacco products/ Avoid exposure to second hand smoke    Surgeon General's Warning:  Quitting smoking now greatly reduces serious risk to your health. Obesity, smoking, and sedentary lifestyle greatly increases your risk for illness    A healthy diet, regular physical exercise & weight monitoring are important for maintaining a healthy lifestyle    You may be retaining fluid if you have a history of heart failure or if you experience any of the following symptoms:  Weight gain of 3 pounds or more overnight or 5 pounds in a week, increased swelling in our hands or feet or shortness of breath while lying flat in bed.   Please call your doctor as soon as you notice any of these symptoms; do not wait until your next office visit. Recognize signs and symptoms of STROKE:    F-face looks uneven    A-arms unable to move or move unevenly    S-speech slurred or non-existent    T-time-call 911 as soon as signs and symptoms begin-DO NOT go       Back to bed or wait to see if you get better-TIME IS BRAIN.

## 2017-07-27 NOTE — PROCEDURES
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 The patient was given the opportunity to ask questions regarding the procedure and its associated risks. In addition to the potential risks associated with the procedure itself, the patient was informed both verbally and in writing of potential side effects of the use glucocorticoids. The patient appeared to comprehend the informed consent and desired to have the procedure performed. The patient was placed in the prone position on the fluoroscopy table and the back prepped and draped in the usual sterile manner. The interlaminar space was identified using fluoroscopy and the skin anesthetized with 1% Lidocaine. A #18 Tuohy Epidural needle was advanced under fluoroscopic control from a paramedian approach to the  epidural space as noted above, using the loss of resistance to fluid technique. Then, 30 mg of preservative free Dexamethasone and 4 cc of Lidocaine was slowly injected. The patient tolerated the procedure well. The injection area was cleaned and band aids applied. No excessive bleeding was noted. Patient dressed and was discharged to home with instructions.

## 2017-07-31 ENCOUNTER — HOSPITAL ENCOUNTER (OUTPATIENT)
Dept: PHYSICAL THERAPY | Age: 67
Discharge: HOME | End: 2017-07-31
Payer: OTHER GOVERNMENT

## 2017-07-31 PROCEDURE — 97112 NEUROMUSCULAR REEDUCATION: CPT

## 2017-07-31 PROCEDURE — 97110 THERAPEUTIC EXERCISES: CPT

## 2017-07-31 NOTE — PROGRESS NOTES
PT DAILY TREATMENT NOTE     Patient Name: Kishor Heredia  Date:2017  : 1950  [x]  Patient  Verified  Payor: [facility] [place] / Plan: [facility]  RETIREES AND DEPENDENTS / Product Type: [place] /    In time:4:02  Out time:4:51  Total Treatment Time (min): 52  Visit #: 16 of 21    Treatment Area: Spondylolisthesis, lumbar region [M43.16]    SUBJECTIVE  Pain Level (0-10 scale): 3/10  Any medication changes, allergies to medications, adverse drug reactions, diagnosis change, or new procedure performed?: [x] No    [] Yes (see summary sheet for update)  Subjective functional status/changes:   [] No changes reported  \"I got my epidural last week and it worked this time. \"    OBJECTIVE    Modality rationale:    Min Type Additional Details    [] Estim:  []Unatt       []IFC  []Premod                        []Other:  []w/ice   []w/heat  Position:  Location:    [] Estim: []Att    []TENS instruct  []NMES                    []Other:  []w/US   []w/ice   []w/heat  Position:  Location:    []  Traction: [] Cervical       []Lumbar                       [] Prone          []Supine                       []Intermittent   []Continuous Lbs:  [] before manual  [] after manual    []  Ultrasound: []Continuous   [] Pulsed                           []1MHz   []3MHz W/cm2:  Location:    []  Iontophoresis with dexamethasone         Location: [] Take home patch   [] In clinic    []  Ice     []  heat  []  Ice massage  []  Laser   []  Anodyne Position:  Location:    []  Laser with stim  []  Other:  Position:  Location:    []  Vasopneumatic Device Pressure:       [] lo [] med [] hi   Temperature: [] lo [] med [] hi   [] Skin assessment post-treatment:  []intact []redness- no adverse reaction    []redness - adverse reaction:      min []Eval                  []Re-Eval       15 min Therapeutic Exercise:  [x] See flow sheet :   Rationale: increase ROM, increase strength and decrease pain to improve the patients ability to tolerate positions and ADLs. min Therapeutic Activity:  []  See flow sheet :        34 min Neuromuscular Re-education:  [x]  See flow sheet :   Rationale: increase strength, improve coordination and increase proprioception  to improve the patients ability to tolerate positions and maintain innominate alignment. min Manual Therapy:          min Gait Training:  ___ feet with ___ device on level surfaces with ___ level of assist   Rationale: With   [] TE   [] TA   [] neuro   [] other: Patient Education: [x] Review HEP    [] Progressed/Changed HEP based on:   [] positioning   [] body mechanics   [] transfers   [] heat/ice application    [] other:      Other Objective/Functional Measures:   Innominates aligned. Pain Level (0-10 scale) post treatment: 3/10    ASSESSMENT/Changes in Function:    Innominates remaining aligned and stable. Pain significantly reduced after epidural 1 week ago. Patient will continue to benefit from skilled PT services to modify and progress therapeutic interventions, address functional mobility deficits, address ROM deficits and address strength deficits to attain remaining goals. []  See Plan of Care  []  See progress note/recertification  []  See Discharge Summary         Progress towards goals / Updated goals:  Short Term Goals: To be accomplished in 3 weeks:  1. Improved postural alignment and awareness to allow self correction during ADL  Status at Eval: postural training initiated  Status at last progress note (7/20/17): met (occasionally wearing back brace, able to self correct posture for pain control)  Current status: not reassessed      2. Patient reports reduction in pain to <or= 5/10 with ADL and ambulatory activities  Status at Eval: pain ranging from 5-10/10  Status at last progress note (7/20/17): no change (pain 7-8/10 at worst)  Current status: not reassessed      Long Term Goals: To be accomplished in 6 weeks:  1.  Improved FOTO score to >or= 65/100 as evidence of improved function with less pain  Status at Eval: FOTO 56/100  Status at last progress note (7/20/17): not met (FOTO: 54/100)  Current status: not reassessed       2. Improved proximal hip strength bilaterally to >or= 4-/5 for improved gait pattern and increased ease with dynamic ADL  Status at Eval: Glut max MMT right 2/5, left 3/5  Status at last progress note (7/20/17): Goal met in regards to isometric strength testing though patient continues with functional strength deficits at end range Ext as follows: right 3-/5, left 4/5; (isometric strength hip flexors: 4+/5 right, 5/5 left (improved 1/3 grade bilaterally), Hip extensors (sitting): 5/5 bilaterally, Hip adductors: 5/5 bilaterally, Hip abductors: 5/5 bilaterally (improved 1/3 grade bilaterally), Knee extensors: 5/5 right (improved 1/3 grade), 5/5 left, Knee flexors: 4+/5 right, 5/5 left, Ankle DF: 4/5 right, 4/5 left (improved 1/3 grade)  Current status: not reassessed        3. Improved SLR to >or= 60 deg bilaterally and Left =Right flexibility for improved trunk neutrality and increased ease with stooping and dynamic activities  Status at Eval: SLR left 40, right 30 deg  Status at last progress note (7/20/17): met (SLR: 65 degrees. right, 70 degrees left)  Current status: not reassessed       4. Improved spinal mobility and ability to reach past mid shin for increased ease with all ADL  Status at Eval: marked HS flexibility deficit and FFD 8 inches with spinal FLex in standing  Status at last progress note (7/20/17): met (finger tip to floor reach:16.5 cm)  Current status: not reassessed       5.  Patient reports reduction in LB and LE pain to <or= 3/10 with ADL and community ambulation  Status at Eval: pain ranging from 5-10/10  Status at last progress note (7/20/17): no change (pain 7-8/10 at worst)  Current status: not reassessed    PLAN  [x]  Upgrade activities as tolerated     [x]  Continue plan of care  []  Update interventions per flow sheet       []  Discharge due to:_  []  Other:_      Clara Neri, PT 7/31/2017  5:07 PM    Future Appointments  Date Time Provider Shilo Anderson   8/1/2017 4:00 PM Clarabernabe Neri, PT [facility]   8/7/2017 4:00 PM Clarabernabe Neri, PT [facility]   8/10/2017 4:00 PM Kanwal Blevins [facility]   8/15/2017 4:00 PM Clara Neri PT [facility]   8/17/2017 4:00 PM [facility]   8/22/2017 4:00 PM Clara Neri PT [facility]   8/23/2017 4:00 PM Lesley Ash MD [facility]

## 2017-08-01 ENCOUNTER — HOSPITAL ENCOUNTER (OUTPATIENT)
Dept: PHYSICAL THERAPY | Age: 67
Discharge: HOME | End: 2017-08-01
Payer: OTHER GOVERNMENT

## 2017-08-01 PROCEDURE — 97112 NEUROMUSCULAR REEDUCATION: CPT

## 2017-08-01 PROCEDURE — 97110 THERAPEUTIC EXERCISES: CPT

## 2017-08-01 NOTE — PROGRESS NOTES
PT DAILY TREATMENT NOTE     Patient Name: Alma Heath  Date:2017  : 1950  [x]  Patient  Verified  Payor:  / Plan: [facility]  RETIREES AND DEPENDENTS / Product Type:  /    In time:3:55  Out time:4:45  Total Treatment Time (min): 50  Visit #: 17 of 21    Treatment Area: Spondylolisthesis, lumbar region [M43.16]    SUBJECTIVE  Pain Level (0-10 scale): 3/10  Any medication changes, allergies to medications, adverse drug reactions, diagnosis change, or new procedure performed?: [x] No    [] Yes (see summary sheet for update)  Subjective functional status/changes:   [] No changes reported  Pain 4/10 at worst since last treatment.     OBJECTIVE    Modality rationale:    Min Type Additional Details    [] Estim:  []Unatt       []IFC  []Premod                        []Other:  []w/ice   []w/heat  Position:  Location:    [] Estim: []Att    []TENS instruct  []NMES                    []Other:  []w/US   []w/ice   []w/heat  Position:  Location:    []  Traction: [] Cervical       []Lumbar                       [] Prone          []Supine                       []Intermittent   []Continuous Lbs:  [] before manual  [] after manual    []  Ultrasound: []Continuous   [] Pulsed                           []1MHz   []3MHz W/cm2:  Location:    []  Iontophoresis with dexamethasone         Location: [] Take home patch   [] In clinic    []  Ice     []  heat  []  Ice massage  []  Laser   []  Anodyne Position:  Location:    []  Laser with stim  []  Other:  Position:  Location:    []  Vasopneumatic Device Pressure:       [] lo [] med [] hi   Temperature: [] lo [] med [] hi   [] Skin assessment post-treatment:  []intact []redness- no adverse reaction    []redness - adverse reaction:      min []Eval                  []Re-Eval       26 min Therapeutic Exercise:  [x] See flow sheet :   Rationale: increase ROM, increase strength and decrease pain to improve the patients ability to tolerate positions and ADLs.     min Therapeutic Activity:  []  See flow sheet :        24 min Neuromuscular Re-education:  [x]  See flow sheet :   Rationale: increase strength, improve coordination and increase proprioception  to improve the patients ability to tolerate positions and maintain innominate alignment. min Manual Therapy:          min Gait Training:  ___ feet with ___ device on level surfaces with ___ level of assist   Rationale: With   [] TE   [] TA   [] neuro   [] other: Patient Education: [x] Review HEP    [] Progressed/Changed HEP based on:   [] positioning   [] body mechanics   [] transfers   [] heat/ice application    [] other:      Other Objective/Functional Measures:   Innominates aligned. Pain Level (0-10 scale) post treatment: 3/10    ASSESSMENT/Changes in Function:    Innominates remaining aligned and stable. Pt's pain remaining low (4/10 at worst), but not decreasing lower than 3/10 at best.    Patient will continue to benefit from skilled PT services to modify and progress therapeutic interventions, address functional mobility deficits, address ROM deficits and address strength deficits to attain remaining goals. []  See Plan of Care  []  See progress note/recertification  []  See Discharge Summary         Progress towards goals / Updated goals:  Short Term Goals: To be accomplished in 3 weeks:  1. Improved postural alignment and awareness to allow self correction during ADL  Status at [facility]: postural training initiated  Status at last progress note (7/20/17): met (occasionally wearing back brace, able to self correct posture for pain control)      2. Patient reports reduction in pain to <or= 5/10 with ADL and ambulatory activities  Status at [facility]: pain ranging from 5-10/10  Status at last progress note (7/20/17): no change (pain 7-8/10 at worst)  Current status: met (pain 4/10 at worst) 8/01/17      Long Term Goals: To be accomplished in 6 weeks:  1.  Improved FOTO score to >or= 65/100 as evidence of improved function with less pain  Status at Eval: FOTO 56/100  Status at last progress note (7/20/17): not met (FOTO: 54/100)  Current status: not reassessed       2. Improved proximal hip strength bilaterally to >or= 4-/5 for improved gait pattern and increased ease with dynamic ADL  Status at Eval: Glut max MMT right 2/5, left 3/5  Status at last progress note (7/20/17): Goal met in regards to isometric strength testing though patient continues with functional strength deficits at end range Ext as follows: right 3-/5, left 4/5; (isometric strength hip flexors: 4+/5 right, 5/5 left (improved 1/3 grade bilaterally), Hip extensors (sitting): 5/5 bilaterally, Hip adductors: 5/5 bilaterally, Hip abductors: 5/5 bilaterally (improved 1/3 grade bilaterally), Knee extensors: 5/5 right (improved 1/3 grade), 5/5 left, Knee flexors: 4+/5 right, 5/5 left, Ankle DF: 4/5 right, 4/5 left (improved 1/3 grade)  Current status: not reassessed      3. Improved SLR to >or= 60 deg bilaterally and Left =Right flexibility for improved trunk neutrality and increased ease with stooping and dynamic activities  Status at Eval: SLR left 40, right 30 deg  Status at last progress note (7/20/17): met (SLR: 65 degrees. right, 70 degrees left)      4. Improved spinal mobility and ability to reach past mid shin for increased ease with all ADL  Status at Eval: marked HS flexibility deficit and FFD 8 inches with spinal FLex in standing  Status at last progress note (7/20/17): met (finger tip to floor reach:16.5 cm)      5.  Patient reports reduction in LB and LE pain to <or= 3/10 with ADL and community ambulation  Status at Eval: pain ranging from 5-10/10  Status at last progress note (7/20/17): no change (pain 7-8/10 at worst)  Current status: nearly met (pain 4/10 at worst) 8/01/17    PLAN  []  Upgrade activities as tolerated     [x]  Continue plan of care  []  Update interventions per flow sheet       []  Discharge due to:_  []  Other:_ Sebas Pritchett V, PT 8/1/2017  4:16 PM    Future Appointments  Date Time Provider Shilo Cruzi   8/7/2017 4:00 PM GRAYSON MattsonHPRHONDA [facility]   8/10/2017 4:00 PM Charly CLARK [facility]   8/15/2017 4:00 PM GRAYSON MattsonHPRHONDA [facility]   8/17/2017 4:00 PM Jacqueline Gibbons PT [facility]   8/22/2017 4:00 PM GRAYSON MattsonHPRHONDA [facility]   8/23/2017 4:00 PM Nabil Bishop MD Located [facility]

## 2017-08-07 ENCOUNTER — HOSPITAL ENCOUNTER (OUTPATIENT)
Dept: PHYSICAL THERAPY | Age: 67
Discharge: HOME | End: 2017-08-07
Payer: OTHER GOVERNMENT

## 2017-08-07 PROCEDURE — 97110 THERAPEUTIC EXERCISES: CPT

## 2017-08-07 PROCEDURE — 97140 MANUAL THERAPY 1/> REGIONS: CPT

## 2017-08-07 PROCEDURE — 97112 NEUROMUSCULAR REEDUCATION: CPT

## 2017-08-07 NOTE — PROGRESS NOTES
PT DAILY TREATMENT NOTE     Patient Name: Vijay Erp  Date:2017  : 1950  [x]  Patient  Verified  Payor:  / Plan: [facility]  RETIREES AND DEPENDENTS / Product Type:  /    In time:4:05  Out time:5:10  Total Treatment Time (min): 72  Visit #: 18 of 21    Treatment Area: Spondylolisthesis, lumbar region [M43.16]    SUBJECTIVE  Pain Level (0-10 scale): 410 left knee and back  Any medication changes, allergies to medications, adverse drug reactions, diagnosis change, or new procedure performed?: [x] No    [] Yes (see summary sheet for update)  Subjective functional status/changes:   [] No changes reported  \"My left leg and knee were acting up so I had to put my brace on to help me walk today. \"    OBJECTIVE    Modality rationale:    Min Type Additional Details    [] Estim:  []Unatt       []IFC  []Premod                        []Other:  []w/ice   []w/heat  Position:  Location:    [] Estim: []Att    []TENS instruct  []NMES                    []Other:  []w/US   []w/ice   []w/heat  Position:  Location:    []  Traction: [] Cervical       []Lumbar                       [] Prone          []Supine                       []Intermittent   []Continuous Lbs:  [] before manual  [] after manual    []  Ultrasound: []Continuous   [] Pulsed                           []1MHz   []3MHz W/cm2:  Location:    []  Iontophoresis with dexamethasone         Location: [] Take home patch   [] In clinic    []  Ice     []  heat  []  Ice massage  []  Laser   []  Anodyne Position:  Location:    []  Laser with stim  []  Other:  Position:  Location:    []  Vasopneumatic Device Pressure:       [] lo [] med [] hi   Temperature: [] lo [] med [] hi   [] Skin assessment post-treatment:  []intact []redness- no adverse reaction    []redness - adverse reaction:      min []Eval                  []Re-Eval       30 min Therapeutic Exercise:  [x] See flow sheet :  Added marching and squats   Rationale: increase ROM, increase strength and decrease pain to improve the patients ability to tolerate positions and ADLs. min Therapeutic Activity:  []  See flow sheet :        25 min Neuromuscular Re-education:  []  See flow sheet :   Rationale: increase strength, improve coordination and increase proprioception  to improve the patients ability to tolerate positions and maintain innominate alignment. 10 min Manual Therapy:    METs to correct right innominate upslip   Rationale: decrease pain and correct joint alignment and joint mechanics to tolerate positions and improve gait. min Gait Training:  ___ feet with ___ device on level surfaces with ___ level of assist   Rationale: With   [] TE   [] TA   [] neuro   [] other: Patient Education: [x] Review HEP    [] Progressed/Changed HEP based on:   [] positioning   [] body mechanics   [] transfers   [] heat/ice application    [] other:      Other Objective/Functional Measures:   right LE shorter in supine and sitting- right innominate upslip     Pain Level (0-10 scale) post treatment: 4/10    ASSESSMENT/Changes in Function:    Pt displayed right innominate upslip today and it was successfully corrected using METs allowing pt to display a more symmetric gait pattern. Patient will continue to benefit from skilled PT services to modify and progress therapeutic interventions, address functional mobility deficits, address ROM deficits and address strength deficits to attain remaining goals.      []  See Plan of Care  []  See progress note/recertification  []  See Discharge Summary      Progress towards goals / Updated goals:  Short Term Goals: To be accomplished in 3 weeks:  1. Improved postural alignment and awareness to allow self correction during ADL  Status at Eval: postural training initiated  Status at last progress note (7/20/17): met (occasionally wearing back brace, able to self correct posture for pain control)      2.  Patient reports reduction in pain to <or= 5/10 with ADL and ambulatory activities  Status at Eval: pain ranging from 5-10/10  Status at last progress note (7/20/17): no change (pain 7-8/10 at worst)  Current status: met (pain 4/10 at worst) 8/01/17      Long Term Goals: To be accomplished in 6 weeks:  1. Improved FOTO score to >or= 65/100 as evidence of improved function with less pain  Status at Eval: FOTO 56/100  Status at last progress note (7/20/17): not met (FOTO: 54/100)  Current status: not reassessed       2. Improved proximal hip strength bilaterally to >or= 4-/5 for improved gait pattern and increased ease with dynamic ADL  Status at Eval: Glut max MMT right 2/5, left 3/5  Status at last progress note (7/20/17): Goal met in regards to isometric strength testing though patient continues with functional strength deficits at end range Ext as follows: right 3-/5, left 4/5; (isometric strength hip flexors: 4+/5 right, 5/5 left (improved 1/3 grade bilaterally), Hip extensors (sitting): 5/5 bilaterally, Hip adductors: 5/5 bilaterally, Hip abductors: 5/5 bilaterally (improved 1/3 grade bilaterally), Knee extensors: 5/5 right (improved 1/3 grade), 5/5 left, Knee flexors: 4+/5 right, 5/5 left, Ankle DF: 4/5 right, 4/5 left (improved 1/3 grade)  Current status: not reassessed      3. Improved SLR to >or= 60 deg bilaterally and Left =Right flexibility for improved trunk neutrality and increased ease with stooping and dynamic activities  Status at Eval: SLR left 40, right 30 deg  Status at last progress note (7/20/17): met (SLR: 65 degrees. right, 70 degrees left)      4. Improved spinal mobility and ability to reach past mid shin for increased ease with all ADL  Status at Eval: marked HS flexibility deficit and FFD 8 inches with spinal FLex in standing  Status at last progress note (7/20/17): met (finger tip to floor reach:16.5 cm)      5.  Patient reports reduction in LB and LE pain to <or= 3/10 with ADL and community ambulation  Status at Eval: pain ranging from 5-10/10  Status at last progress note (7/20/17): no change (pain 7-8/10 at worst)  Current status: nearly met (pain 4/10 at worst) 8/01/17     PLAN  [x]  Upgrade activities as tolerated     [x]  Continue plan of care  []  Update interventions per flow sheet       []  Discharge due to:_  []  Other:_      Latosha Eid, PT 8/7/2017  4:29 PM    Future Appointments  Date Time Provider Shilo Anderson   8/10/2017 4:00 PM Juvenal Villanueva [facility]   8/15/2017 4:00 PM Latosha Eid PT [facility]   8/17/2017 4:00 PM Juventino Pompa, GRAYSON [facility]   8/22/2017 4:00 PM Yarelis Foster V PT [facility]   8/23/2017 4:00 PM Elham Mojica MD [facility]

## 2017-08-10 ENCOUNTER — HOSPITAL ENCOUNTER (OUTPATIENT)
Dept: PHYSICAL THERAPY | Age: 67
Discharge: HOME | End: 2017-08-10
Payer: OTHER GOVERNMENT

## 2017-08-10 PROCEDURE — 97110 THERAPEUTIC EXERCISES: CPT

## 2017-08-11 NOTE — PROGRESS NOTES
PT DAILY TREATMENT NOTE     Patient Name: Edil Benitez  Date:2017  : 1950  [x]  Patient  Verified  Payor:  / Plan: [facility]  RETIREES AND DEPENDENTS / Product Type:  /    In time:4:05  Out time:452  Total Treatment Time (min): 52  Visit #: 23 of 21    Treatment Area: Spondylolisthesis, lumbar region [M43.16]    SUBJECTIVE  Pain Level (0-10 scale): 3/10 left knee, 2/10 back  Any medication changes, allergies to medications, adverse drug reactions, diagnosis change, or new procedure performed?: [x] No    [] Yes (see summary sheet for update)  Subjective functional status/changes:   [] No changes reported  \"My lpain fluctuates\"    OBJECTIVE    Modality rationale:    Min Type Additional Details    [] Estim:  []Unatt       []IFC  []Premod                        []Other:  []w/ice   []w/heat  Position:  Location:    [] Estim: []Att    []TENS instruct  []NMES                    []Other:  []w/US   []w/ice   []w/heat  Position:  Location:    []  Traction: [] Cervical       []Lumbar                       [] Prone          []Supine                       []Intermittent   []Continuous Lbs:  [] before manual  [] after manual    []  Ultrasound: []Continuous   [] Pulsed                           []1MHz   []3MHz W/cm2:  Location:    []  Iontophoresis with dexamethasone         Location: [] Take home patch   [] In clinic    []  Ice     []  heat  []  Ice massage  []  Laser   []  Anodyne Position:  Location:    []  Laser with stim  []  Other:  Position:  Location:    []  Vasopneumatic Device Pressure:       [] lo [] med [] hi   Temperature: [] lo [] med [] hi   [] Skin assessment post-treatment:  []intact []redness- no adverse reaction    []redness - adverse reaction:      min []Eval                  []Re-Eval       47 min Therapeutic Exercise:  [x] See flow sheet :  Added marching and squats   Rationale: increase ROM, increase strength and decrease pain to improve the patients ability to tolerate positions and ADLs. min Therapeutic Activity:  []  See flow sheet :         min Neuromuscular Re-education:  []  See flow sheet :   Rationale: increase strength, improve coordination and increase proprioception  to improve the patients ability to tolerate positions and maintain innominate alignment. min Manual Therapy:    METs to correct right innominate upslip        min Gait Training:  ___ feet with ___ device on level surfaces with ___ level of assist   Rationale: With   [] TE   [] TA   [] neuro   [] other: Patient Education: [x] Review HEP    [] Progressed/Changed HEP based on:   [] positioning   [] body mechanics   [] transfers   [] heat/ice application    [] other:      Other Objective/Functional Measures:   Limited right hip mobility and strength deficit persists       Pain Level (0-10 scale) post treatment: 3/10 knee, 2/10 LB    ASSESSMENT/Changes in Function:        Patient will continue to benefit from skilled PT services to modify and progress therapeutic interventions, address functional mobility deficits, address ROM deficits and address strength deficits to attain remaining goals.      [x]  See Plan of Care  []  See progress note/recertification  []  See Discharge Summary      Progress towards goals / Updated goals:  Short Term Goals: To be accomplished in 3 weeks:  1. Improved postural alignment and awareness to allow self correction during ADL  Status at [facility]: postural training initiated  Status at last progress note (7/20/17): met (occasionally wearing back brace, able to self correct posture for pain control)      2. Patient reports reduction in pain to <or= 5/10 with ADL and ambulatory activities  Status at [facility]: pain ranging from 5-10/10  Status at last progress note (7/20/17): no change (pain 7-8/10 at worst)  Current status: met (pain 4/10 at worst) 8/01/17      Long Term Goals: To be accomplished in 6 weeks:  1.  Improved FOTO score to >or= 65/100 as evidence of improved function with less pain  Status at Eval: FOTO 56/100  Status at last progress note (7/20/17): not met (FOTO: 54/100)  Current status: not reassessed       2. Improved proximal hip strength bilaterally to >or= 4-/5 for improved gait pattern and increased ease with dynamic ADL  Status at Eval: Glut max MMT right 2/5, left 3/5  Status at last progress note (7/20/17): Goal met in regards to isometric strength testing though patient continues with functional strength deficits at end range Ext as follows: right 3-/5, left 4/5; (isometric strength hip flexors: 4+/5 right, 5/5 left (improved 1/3 grade bilaterally), Hip extensors (sitting): 5/5 bilaterally, Hip adductors: 5/5 bilaterally, Hip abductors: 5/5 bilaterally (improved 1/3 grade bilaterally), Knee extensors: 5/5 right (improved 1/3 grade), 5/5 left, Knee flexors: 4+/5 right, 5/5 left, Ankle DF: 4/5 right, 4/5 left (improved 1/3 grade)  Current status: not reassessed      3. Improved SLR to >or= 60 deg bilaterally and Left =Right flexibility for improved trunk neutrality and increased ease with stooping and dynamic activities  Status at Eval: SLR left 40, right 30 deg  Status at last progress note (7/20/17): met (SLR: 65 degrees. right, 70 degrees left)      4. Improved spinal mobility and ability to reach past mid shin for increased ease with all ADL  Status at Eval: marked HS flexibility deficit and FFD 8 inches with spinal FLex in standing  Status at last progress note (7/20/17): met (finger tip to floor reach:16.5 cm)      5.  Patient reports reduction in LB and LE pain to <or= 3/10 with ADL and community ambulation  Status at Eval: pain ranging from 5-10/10  Status at last progress note (7/20/17): no change (pain 7-8/10 at worst)  Current status: nearly met (pain 4/10 at worst) 8/01/17     PLAN  [x]  Upgrade activities as tolerated     [x]  Continue plan of care  []  Update interventions per flow sheet       []  Discharge due to:_  []  Other:_ Chris Real, PT 8/11/2017  4:29 PM    Future Appointments  Date Time Provider Shilo Anderson   8/15/2017 4:00 PM Robert Louie, GRA[facility]PTD [facility]   8/17/2017 4:00 PM Silvestre Garcias, GRAYSON PONCEHPRHONDA [facility]   8/22/2017 4:00 PM Robert Louie, PT [facility]PT[facility]IMA [facility]   8/23/2017 4:00 PM Alex Wylie MD [facility]

## 2017-08-15 ENCOUNTER — HOSPITAL ENCOUNTER (OUTPATIENT)
Dept: PHYSICAL THERAPY | Age: 67
Discharge: HOME | End: 2017-08-15
Payer: OTHER GOVERNMENT

## 2017-08-15 PROCEDURE — 97112 NEUROMUSCULAR REEDUCATION: CPT

## 2017-08-15 PROCEDURE — 97110 THERAPEUTIC EXERCISES: CPT

## 2017-08-15 NOTE — PROGRESS NOTES
PT DAILY TREATMENT NOTE     Patient Name: Bladimir Thomas  Date:8/15/2017  : 1950  [x]  Patient  Verified  Payor:  / Plan: [facility]  RETIREES AND DEPENDENTS / Product Type: Clemon Hope /    In time:4:00  Out time:5:13  Total Treatment Time (min): 68  Visit #: 20 of 21    Treatment Area: Spondylolisthesis, lumbar region [M43.16]    SUBJECTIVE  Pain Level (0-10 scale): 4/10  Any medication changes, allergies to medications, adverse drug reactions, diagnosis change, or new procedure performed?: [x] No    [] Yes (see summary sheet for update)  Subjective functional status/changes:   [] No changes reported  \"I'm about 70% back to normal activities.   Pain 4/10 at worst.\"    OBJECTIVE    Modality rationale:    Min Type Additional Details    [] Estim:  []Unatt       []IFC  []Premod                        []Other:  []w/ice   []w/heat  Position:  Location:    [] Estim: []Att    []TENS instruct  []NMES                    []Other:  []w/US   []w/ice   []w/heat  Position:  Location:    []  Traction: [] Cervical       []Lumbar                       [] Prone          []Supine                       []Intermittent   []Continuous Lbs:  [] before manual  [] after manual    []  Ultrasound: []Continuous   [] Pulsed                           []1MHz   []3MHz W/cm2:  Location:    []  Iontophoresis with dexamethasone         Location: [] Take home patch   [] In clinic    []  Ice     []  heat  []  Ice massage  []  Laser   []  Anodyne Position:  Location:    []  Laser with stim  []  Other:  Position:  Location:    []  Vasopneumatic Device Pressure:       [] lo [] med [] hi   Temperature: [] lo [] med [] hi   [] Skin assessment post-treatment:  []intact []redness- no adverse reaction    []redness - adverse reaction:      min []Eval                  []Re-Eval       23 min Therapeutic Exercise:  [x] See flow sheet :   Rationale: increase ROM, increase strength and decrease pain to improve the patients ability to tolerate positions and ADLs. min Therapeutic Activity:  []  See flow sheet :        50 min Neuromuscular Re-education:  [x]  See flow sheet :   Rationale: increase strength, improve coordination and increase proprioception  to improve the patients ability to tolerate positions and maintain innominate alignment. min Manual Therapy:          min Gait Training:  ___ feet with ___ device on level surfaces with ___ level of assist   Rationale: With   [] TE   [] TA   [] neuro   [] other: Patient Education: [x] Review HEP    [] Progressed/Changed HEP based on:   [] positioning   [] body mechanics   [] transfers   [] heat/ice application    [] other:      Other Objective/Functional Measures:   Strength hip flexors: 5/5 bilaterally  Hip extensors: 5/5 bilaterally  Hip adductors: 5/5 bilaterally  Hip abductors: 5/5 bilaterally  Knee flexors: 4+/5 right, 5/5 left  Knee extensors: 4+/5 right, 4+/5 left  Ankle DF: 4+/5 right, 4+/5 left  Innominates aligned. Pain Level (0-10 scale) post treatment: 2/10    ASSESSMENT/Changes in Function:    Pt's function per FOTO continues to decrease, but he reports improvement in ADLs and return to about 70% of prior level of function. Due to lack of additional significant progress pt will be discharged after next visit to [facility] emphasizing core and LE strengthening exercises. Innominates remaining aligned and stable. Patient will continue to benefit from skilled PT services to modify and progress therapeutic interventions, address functional mobility deficits, address ROM deficits and address strength deficits to attain remaining goals. []  See Plan of Care  []  See progress note/recertification  []  See Discharge Summary         Progress towards goals / Updated goals:  Short Term Goals: To be accomplished in 3 weeks:  1.  Improved postural alignment and awareness to allow self correction during ADL  Status at Eval: postural training initiated  Status at last progress note (7/20/17): met (occasionally wearing back brace, able to self correct posture for pain control)      2. Patient reports reduction in pain to <or= 5/10 with ADL and ambulatory activities  Status at Eval: pain ranging from 5-10/10  Status at last progress note (7/20/17): no change (pain 7-8/10 at worst)  Current status: met (pain 4/10 at worst) 8/15/17      Long Term Goals: To be accomplished in 6 weeks:  1. Improved FOTO score to >or= 65/100 as evidence of improved function with less pain  Status at Eval: FOTO 56/100  Status at last progress note (7/20/17): not met (FOTO: 54/100)  Current status: not met, regressed (FOTO: 49/100) 8/15/17      2. Improved proximal hip strength bilaterally to >or= 4-/5 for improved gait pattern and increased ease with dynamic ADL  Status at Eval: Glut max MMT right 2/5, left 3/5  Status at last progress note (7/20/17): Goal met in regards to isometric strength testing though patient continues with functional strength deficits at end range Ext as follows: right 3-/5, left 4/5; (isometric strength hip flexors: 4+/5 right, 5/5 left (improved 1/3 grade bilaterally), Hip extensors (sitting): 5/5 bilaterally, Hip adductors: 5/5 bilaterally, Hip abductors: 5/5 bilaterally (improved 1/3 grade bilaterally), Knee extensors: 5/5 right (improved 1/3 grade), 5/5 left, Knee flexors: 4+/5 right, 5/5 left, Ankle DF: 4/5 right, 4/5 left (improved 1/3 grade)  Current status: met (Strength hip flexors: 5/5 bilaterally, Hip extensors: 5/5 bilaterally, Hip adductors: 5/5 bilaterally, Hip abductors: 5/5 bilaterally, Knee flexors: 4+/5 right, 5/5 left, Knee extensors: 4+/5 right, 4+/5 left, Ankle DF: 4+/5 right, 4+/5 left) 8/15/17      3.  Improved SLR to >or= 60 deg bilaterally and Left =Right flexibility for improved trunk neutrality and increased ease with stooping and dynamic activities  Status at Eval: SLR left 40, right 30 deg  Status at last progress note (7/20/17): met (SLR: 65 degrees. right, 70 degrees left)      4. Improved spinal mobility and ability to reach past mid shin for increased ease with all ADL  Status at Eval: marked HS flexibility deficit and FFD 8 inches with spinal FLex in standing  Status at last progress note (7/20/17): met (finger tip to floor reach:16.5 cm)      5.  Patient reports reduction in LB and LE pain to <or= 3/10 with ADL and community ambulation  Status at Eval: pain ranging from 5-10/10  Status at last progress note (7/20/17): no change (pain 7-8/10 at worst)  Current status: nearly met (pain 4/10 at worst) 8/15/17    PLAN  []  Upgrade activities as tolerated     []  Continue plan of care  []  Update interventions per flow sheet       []  Discharge due to:_  [x]  Other: D/C after next visit, emphasize back/core stability      Elías Hsieh V PT 8/15/2017  4:16 PM    Future Appointments  Date Time Provider Shilo Anderson   8/17/2017 4:00 PM Juvenal Rosas MIHPTD THE [facility]   8/22/2017 4:00 PM Chintan Morris PT MI[facility]PT[facility]IMA THE [facility]   8/23/2017 4:00 PM Bibiana Way MD [facility]

## 2017-08-17 ENCOUNTER — HOSPITAL ENCOUNTER (OUTPATIENT)
Dept: PHYSICAL THERAPY | Age: 67
Discharge: HOME | End: 2017-08-17
Payer: OTHER GOVERNMENT

## 2017-08-17 PROCEDURE — 97110 THERAPEUTIC EXERCISES: CPT | Performed by: PHYSICAL THERAPIST

## 2017-08-17 NOTE — PROGRESS NOTES
PT DAILY TREATMENT NOTE     Patient Name: Vijay Erp  Date:2017  : 1950  [x]  Patient  Verified  Payor: [employer] / Plan: [facility]  RETIREES AND DEPENDENTS / Product Type: Sergey Harmon /    In time:3:58  Out time:4:47  Total Treatment Time (min): 49  Visit #: 21 of 24    Treatment Area: Spondylolisthesis, lumbar region [M43.16]    SUBJECTIVE  Pain Level (0-10 scale): 3/10  Any medication changes, allergies to medications, adverse drug reactions, diagnosis change, or new procedure performed?: [x] No    [] Yes (see summary sheet for update)  Subjective functional status/changes:   [] No changes reported  Feels okay. Not sure if he wants to be discharged today. Uncertain about how he will manage his pain independently. OBJECTIVE    49 min Therapeutic Exercise:  [x] See flow sheet :   Rationale: increase ROM, increase strength and decrease pain to improve the patients ability to complete ADLs    With   [x] TE   [] TA   [] neuro   [] other: Patient Education: [x] Review HEP    [] Progressed/Changed HEP based on:   [] positioning   [] body mechanics   [] transfers   [] heat/ice application    [] other:        Pain Level (0-10 scale) post treatment: 2    ASSESSMENT/Changes in Function: Patient responds well to treatment session. Is challenged by exercises, especially core strengthening. Will benefit from further follow-up to progress and re-evaluate in 2 weeks time. No adverse effects were noted from today's treatment session    Patient will continue to benefit from skilled PT services to modify and progress therapeutic interventions, address functional mobility deficits, address ROM deficits, address strength deficits, analyze and address soft tissue restrictions and analyze and cue movement patterns to attain remaining goals.      [x]  See Plan of Care  []  See progress note/recertification  []  See Discharge Summary         Progress towards goals / Updated goals:  Short Term Goals: To be accomplished in 3 weeks:  1. Improved postural alignment and awareness to allow self correction during ADL  Status at Eval: postural training initiated  Status at last progress note (7/20/17): met (occasionally wearing back brace, able to self correct posture for pain control)      2. Patient reports reduction in pain to <or= 5/10 with ADL and ambulatory activities  Status at Eval: pain ranging from 5-10/10  Status at last progress note (7/20/17): no change (pain 7-8/10 at worst)  Current status: met (pain 4/10 at worst) 8/15/17      Long Term Goals: To be accomplished in 6 weeks:  1. Improved FOTO score to >or= 65/100 as evidence of improved function with less pain  Status at Eval: FOTO 56/100  Status at last progress note (7/20/17): not met (FOTO: 54/100)  Current status: not met, regressed (FOTO: 49/100) 8/15/17      2. Improved proximal hip strength bilaterally to >or= 4-/5 for improved gait pattern and increased ease with dynamic ADL  Status at Eval: Glut max MMT right 2/5, left 3/5  Status at last progress note (7/20/17): Goal met in regards to isometric strength testing though patient continues with functional strength deficits at end range Ext as follows: right 3-/5, left 4/5; (isometric strength hip flexors: 4+/5 right, 5/5 left (improved 1/3 grade bilaterally), Hip extensors (sitting): 5/5 bilaterally, Hip adductors: 5/5 bilaterally, Hip abductors: 5/5 bilaterally (improved 1/3 grade bilaterally), Knee extensors: 5/5 right (improved 1/3 grade), 5/5 left, Knee flexors: 4+/5 right, 5/5 left, Ankle DF: 4/5 right, 4/5 left (improved 1/3 grade)  Current status: met (Strength hip flexors: 5/5 bilaterally, Hip extensors: 5/5 bilaterally, Hip adductors: 5/5 bilaterally, Hip abductors: 5/5 bilaterally, Knee flexors: 4+/5 right, 5/5 left, Knee extensors: 4+/5 right, 4+/5 left, Ankle DF: 4+/5 right, 4+/5 left) 8/15/17      3.  Improved SLR to >or= 60 deg bilaterally and Left =Right flexibility for improved trunk neutrality and increased ease with stooping and dynamic activities  Status at Eval: SLR left 40, right 30 deg  Status at last progress note (7/20/17): met (SLR: 65 degrees. right, 70 degrees left)      4. Improved spinal mobility and ability to reach past mid shin for increased ease with all ADL  Status at Eval: marked HS flexibility deficit and FFD 8 inches with spinal FLex in standing  Status at last progress note (7/20/17): met (finger tip to floor reach:16.5 cm)      5. Patient reports reduction in LB and LE pain to <or= 3/10 with ADL and community ambulation  Status at Eval: pain ranging from 5-10/10  Status at last progress note (7/20/17): no change (pain 7-8/10 at worst)  Current status: nearly met (pain 4/10 at worst) 8/15/17    PLAN  []  Upgrade activities as tolerated     [x]  Continue plan of care  []  Update interventions per flow sheet       []  Discharge due to:_  [x]  Other:_   F/u in 2 weeks. Progress plan, then likely discharge.      Codi Montanez, PT, DPT 8/17/2017  4:12 PM    Future Appointments  Date Time Provider Shilo Anderson   8/22/2017 4:00 PM Aloma Kanner, [place] MIHPTNAIMA [facility]   8/23/2017 4:00 PM MD Jacky Cruz Venu

## 2017-08-22 ENCOUNTER — APPOINTMENT (OUTPATIENT)
Dept: PHYSICAL THERAPY | Age: 67
End: 2017-08-22
Payer: OTHER GOVERNMENT

## 2017-08-23 ENCOUNTER — OFFICE VISIT (OUTPATIENT)
Dept: ORTHOPEDIC SURGERY | Age: 67
End: 2017-08-23

## 2017-08-23 VITALS
WEIGHT: 179.6 LBS | HEART RATE: 70 BPM | HEIGHT: 71 IN | DIASTOLIC BLOOD PRESSURE: 66 MMHG | SYSTOLIC BLOOD PRESSURE: 101 MMHG | RESPIRATION RATE: 16 BRPM | BODY MASS INDEX: 25.15 KG/M2

## 2017-08-23 DIAGNOSIS — M48.061 LUMBAR SPINAL STENOSIS: ICD-10-CM

## 2017-08-23 DIAGNOSIS — M54.12 CERVICAL NEURITIS: ICD-10-CM

## 2017-08-23 DIAGNOSIS — M51.26 LUMBAR HERNIATED DISC: Primary | ICD-10-CM

## 2017-08-23 DIAGNOSIS — M47.816 SPONDYLOSIS OF LUMBAR REGION WITHOUT MYELOPATHY OR RADICULOPATHY: ICD-10-CM

## 2017-08-23 DIAGNOSIS — M96.1 CERVICAL POSTLAMINECTOMY SYNDROME: ICD-10-CM

## 2017-08-23 RX ORDER — TIAGABINE HYDROCHLORIDE 4 MG/1
4 TABLET, FILM COATED ORAL 2 TIMES DAILY WITH MEALS
Qty: 60 TAB | Refills: 1 | Status: SHIPPED | OUTPATIENT
Start: 2017-08-23 | End: 2017-10-20 | Stop reason: SDUPTHER

## 2017-08-23 NOTE — MR AVS SNAPSHOT
Visit Information Date & Time Provider Department Dept. Phone Encounter #  
 8/23/2017  4:00 PM Christian Presley MD [facility] - [facility] [phone] [id number] Follow-up Instructions Return in about 4 weeks (around 9/20/2017). Upcoming Health Maintenance Date Due Hepatitis C Screening 1950 Pneumococcal 19-64 Medium Risk (1 of 1 - PPSV23) 10/18/1971 DTaP/Tdap/Td series (1 - Tdap) 10/18/1973 FOBT Q 1 YEAR AGE 50-75 10/18/2002 ZOSTER VACCINE AGE 60> 8/18/2012 INFLUENZA AGE 9 TO ADULT 8/1/2017 Allergies as of 8/23/2017  Review Complete On: 8/23/2017 By: Christian Presley MD  
 No Known Allergies Current Immunizations  Never Reviewed No immunizations on file. Not reviewed this visit You Were Diagnosed With   
  
 Codes Comments Lumbar herniated disc    -  Primary ICD-10-CM: M51.26 
ICD-9-CM: 722.10 Lumbar spinal stenosis     ICD-10-CM: M48.06 
ICD-9-CM: 724.02 Spondylosis of lumbar region without myelopathy or radiculopathy     ICD-10-CM: M47.816 ICD-9-CM: 721.3 Cervical postlaminectomy syndrome     ICD-10-CM: M96.1 ICD-9-CM: 722.81 Cervical neuritis     ICD-10-CM: M54.12 
ICD-9-CM: 723.4 Vitals BP Pulse Resp Height(growth percentile) Weight(growth percentile) BMI  
 101/66 70 16 5' 11\" (1.803 m) 179 lb 9.6 oz (81.5 kg) 25.05 kg/m2 Smoking Status Current Every Day Smoker Vitals History BMI and BSA Data Body Mass Index Body Surface Area 25.05 kg/m 2 2.02 m 2 Preferred Pharmacy Pharmacy Name Phone [facility] [address] [phone] Your Updated Medication List  
  
   
This list is accurate as of: 8/23/17  4:46 PM.  Always use your most recent med list.  
  
  
  
  
 aspirin delayed-release 81 mg tablet Take  by mouth daily. baclofen 10 mg tablet Commonly known as:  LIORESAL Take 20 mg by mouth three (3) times daily. etodolac 400 mg tablet Commonly known as:  LODINE  
400 mg.  
  
 gabapentin 800 mg tablet Commonly known as:  NEURONTIN Take 300 mg by mouth three (3) times daily. lisinopril-hydroCHLOROthiazide 20-12.5 mg per tablet Commonly known as:  [place] Take  by mouth daily. Omeprazole delayed release 20 mg tablet Commonly known as:  PRILOSEC D/R  
20 mg.  
  
 tiaGABine 4 mg tablet Commonly known as:  GABITRIL Take 1 Tab by mouth two (2) times daily (with meals). TYLENOL ARTHRITIS PAIN 650 mg CR tablet Generic drug:  acetaminophen Take 650 mg by mouth every six (6) hours as needed for Pain. VICODIN 5-300 mg tablet Generic drug:  HYDROcodone-acetaminophen Take  by mouth. Follow-up Instructions Return in about 4 weeks (around 9/20/2017). To-Do List   
 09/06/2017 4:00 PM  
  Appointment with Yusuf Denny PT at THE [facility] ([phone]) Introducing [facility]! Mathieu Stoddard introduces [employer] patient portal. Now you can access parts of your medical record, email your doctor's office, and request medication refills online. 1. In your internet browser, go to [email or web address] 2. Click on the First Time User? Click Here link in the Sign In box. You will see the New Member Sign Up page. 3. Enter your [employer] Access Code exactly as it appears below. You will not need to use this code after youve completed the sign-up process. If you do not sign up before the expiration date, you must request a new code. · [employer] Access Code: [email or web address] Expires: 11/20/2017  4:31 PM 
 
4. Enter the last four digits of your Social Security Number (xxxx) and Date of Birth (mm/dd/yyyy) as indicated and click Submit. You will be taken to the next sign-up page. 5. Create a [employer] ID. This will be your [employer] login ID and cannot be changed, so think of one that is secure and easy to remember. 6. Create a [employer] password. You can change your password at any time. 7. Enter your Password Reset Question and Answer. This can be used at a later time if you forget your password. 8. Enter your e-mail address. You will receive e-mail notification when new information is available in [address] 9. Click Sign Up. You can now view and download portions of your medical record. 10. Click the Download Summary menu link to download a portable copy of your medical information. If you have questions, please visit the Frequently Asked Questions section of the [employer] website. Remember, [employer] is NOT to be used for urgent needs. For medical emergencies, dial 911. Now available from your iPhone and Android! Please provide this summary of care documentation to your next provider. Your primary care clinician is listed as Richard Fisher. If you have any questions after today's visit, please call [phone].

## 2017-08-23 NOTE — PROGRESS NOTES
[facility]  [address], [place]   Phone: [phone]  Fax: [phone]        PROGRESS NOTE      HISTORY OF PRESENT ILLNESS:  The patient is a 61 y.o. male and was seen today for follow up of low back pain as well as distal LLE pain. Pt has complaints of limitations in standing and walking tolerance and describes symptoms consistent with stenosis. His chief complaint is that of left knee pain. Pt has additional c/o neck pain extending into the RUE with a diagnoses of cervical postlaminectomy syndrome. Pt has hx of cervical spine surgery performed by Dr. Sandro Henriquez in 2010. He is followed by Dr. Fede Mitchell for his shoulder. The patient reports significant temporary relief with previous shoulder injections. Pt underwent L2/L3 epidural on 6/2/16 without relief. Noted, I previously had him on LYRICA which he states caused GI upset. Upon review of my note, he had failed higher doses of NEURONTIN. Pt has failed CYMBALTA as it has provided no relief of his symptoms. He reports intolerance to TOPAMAX 75 mg qhs secondary to GI upset. He reports urinary frequency but denies incontinence. He denies h/o DM. An EMG from 12/8/15 revealed a study consistent with a chronic appearing cervical radiculopathy superimposed on mild carpal tunnel syndrome per report. 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 The size and extent of the abnormality appears to be stable to slightly increased in a caudal direction compared with the previous study. There is focal enhancement involving the anterolateral cord at the C3-C4 level. Otherwise, stable lower cervical degenerative disc disease changes with right greater than left foraminal stenoses.  Right upper extremity MRI from 7/2/11 per report, abnormal appearance to the glenoid, which appears slightly dysplastic and flattened, with some mild signal abnormality. Additionally, the coracoid process appears elongated. Cystic change in the adjacent humeral head, suggesting the possibility of the tentative injury. Question if findings are due to congenital dysplasia or (remote) posttraumatic deformity. Mild tendinosis of the supraspinatus and infraspinatus tendons, without tear. The labrum is degenerative in appearance, without definite tear. Moderate cartilage loss in the glenohumeral joint, particularly along the glenoid. Trace glenohumeral joint effusion, with fluid loculated in the subcoracoid recess. Approximately 5x9x5 mm focus of low T2 signal within the subcoracoid recess, which may represent and intra-articular body. Minimal edema within the subacromial, subdeltoid bursa. Moderate acromioclavicular joint osteoarthrosis, with mild impingement. Mild biceps tenosynovitis. Preliminary reading of cervical plain films revealed ACDF C3-4 hardware intact. No acute pathology identified. Degenerative disc at C5/C6 and C6/C7. Preliminary reading of lumbar plain films revealed grade 1 anterolisthesis of L4 on L5. Pan lumbar degenerative disc disease. No acute pathology identified. Lumbar spine MRI dated 5/5/17 reviewed. Per report, multiple level degenerative changes, which resulted in neuroforaminal narrowing and central canal stenosis. At L2-L3, there was a large anterior disc osteophyte complex. At L3-L4, moderate bilateral neuroforaminal narrowing. At L4-L5, there is a 1.2 mm of anterolisthesis of L4 on L5 with no definitive pars defects. Moderate sized broad-based anterior disc osteophyte complex. Mild-to-moderate central canal stenosis. At L5-S1, mild to moderate central canal stenosis. Moderate bilateral neuroforaminal narrowing. LT knee MRI dated 5/11/17 per report revealed edema and fluid within ACL tendon fibers, likely ganglion cyst versus low-grade partial tear. Other major ligaments appear intact.  Lateral meniscus possible posterior horn tear. No definite medial meniscus tear. Patchy high-grade cartilage loss in the patellofemoral and medial compartments, grade 3-4. edema in the suprapatellar fat pad may be seen with quadriceps fat pad impingement. trace joint effusion. Minimal Hoffa's fat pad edema. Small amount of fluid surrounding the semimembranosus tendon, possibly bursitis or tracking popliteal cyst. At his last clinical appointment, the patient had complaints of low back and distal RLE pain. His primary complaint was that of left knee pain. I referred him to ortho for further evaluation and treatment of his left knee. For his low back pain, patient was interested in proceeding with lumbar blocks. I ordered an L4/5 epidural.  I restarted him on Gabitril 2 mg BID. The patient returns today with pain location and distribution remain unchanged. He rates pain 5/10, consistent with his last visit (4-8/10). Pt has been seeing an orthopaedic specialist for his left knee pain at the [place] and reports he has a left TKR pending. Pt underwent L4/5 epidural on 7/27/17 with temporary relief x 1 week. He is tolerating Gabitril 2 mg BID without relief.  reviewed.        Past Medical History:   Diagnosis Date    Arthritis     Cervical postlaminectomy syndrome     Chronic neck pain     Fall     Hypertension     OA (osteoarthritis) of knee     left knee        Social History     Social History    Marital status:      Spouse name: N/A    Number of children: N/A    Years of education: N/A     Occupational History    Retired      Social History Main Topics    Smoking status: Current Every Day Smoker     Years: 13.00     Types: Cigarettes    Smokeless tobacco: Never Used      Comment: 9 cigarettes per day    Alcohol use Yes    Drug use: Not on file    Sexual activity: Not Currently     Partners: Female     Other Topics Concern    Not on file     Social History Narrative       Current Outpatient Prescriptions   Medication Sig Dispense Refill    tiaGABine (GABITRIL) 4 mg tablet Take 1 Tab by mouth two (2) times daily (with meals). 60 Tab 1    acetaminophen (TYLENOL ARTHRITIS PAIN) 650 mg CR tablet Take 650 mg by mouth every six (6) hours as needed for Pain.  Omeprazole delayed release (PRILOSEC D/R) 20 mg tablet 20 mg.      etodolac (LODINE) 400 mg tablet 400 mg.      baclofen (LIORESAL) 10 mg tablet Take 20 mg by mouth three (3) times daily.  aspirin delayed-release 81 mg tablet Take  by mouth daily.  lisinopril-hydrochlorothiazide (PRINZIDE, ZESTORETIC) 20-12.5 mg per tablet Take  by mouth daily.  HYDROcodone-acetaminophen (VICODIN) 5-300 mg tablet Take  by mouth.  gabapentin (NEURONTIN) 800 mg tablet Take 300 mg by mouth three (3) times daily. No Known Allergies     Ambulates with a single point cane. PHYSICAL EXAMINATION    Visit Vitals    /66    Pulse 70    Resp 16    Ht 5' 11\" (1.803 m)    Wt 179 lb 9.6 oz (81.5 kg)    BMI 25.05 kg/m2     CONSTITUTIONAL: NAD, A&O x 3  SENSATION: Intact to light touch throughout  RANGE OF MOTION: The patient has full passive range of motion in all four extremities. MOTOR:  Straight Leg Raise: Negative, bilateral               Hip Flex Knee Ext Knee Flex Ankle DF GTE Ankle PF Tone   Right +4/5 +4/5 +4/5 +4/5 +4/5 +4/5 +4/5   Left +4/5 +4/5 +4/5 +4/5 +4/5 +4/5 +4/5       ASSESSMENT   Diagnoses and all orders for this visit:    1. Lumbar herniated disc  -     tiaGABine (GABITRIL) 4 mg tablet; Take 1 Tab by mouth two (2) times daily (with meals). -     SCHEDULE SURGERY    2. Lumbar spinal stenosis  -     tiaGABine (GABITRIL) 4 mg tablet; Take 1 Tab by mouth two (2) times daily (with meals). -     SCHEDULE SURGERY    3. Spondylosis of lumbar region without myelopathy or radiculopathy  -     tiaGABine (GABITRIL) 4 mg tablet; Take 1 Tab by mouth two (2) times daily (with meals).   -     SCHEDULE SURGERY    4. Cervical postlaminectomy syndrome  -     tiaGABine (GABITRIL) 4 mg tablet; Take 1 Tab by mouth two (2) times daily (with meals). -     SCHEDULE SURGERY    5. Cervical neuritis  -     tiaGABine (GABITRIL) 4 mg tablet; Take 1 Tab by mouth two (2) times daily (with meals). -     SCHEDULE SURGERY          IMPRESSION AND PLAN:  Patient is s/p L4/5 epidural from 7/25/17 noting temporary relief x 1 week. He elects to proceed with additional lumbar blocks. I will order repeat block. I will increase his Gabitril to 4 mg BID. Patient advised to call the office if intolerant to higher dose. I will see the patient back following block. Written by Arlet Quezada, as dictated by Christian Betancur MD  I examined the patient, reviewed and agree with the note.

## 2017-08-24 ENCOUNTER — TELEPHONE (OUTPATIENT)
Dept: ORTHOPEDIC SURGERY | Age: 67
End: 2017-08-24

## 2017-08-24 NOTE — TELEPHONE ENCOUNTER
Medication has been clarified. Tried to call the patient but was unable to reach to inform him of this. Will try again.

## 2017-08-24 NOTE — TELEPHONE ENCOUNTER
Earma Mcburney at [place] called requesting a call back from Evaristo Cruz regarding the patient's prescription tiaGABine (GABITRIL) 4 mg tablet. Please advise Earma Mcburney at [phone].

## 2017-08-25 NOTE — TELEPHONE ENCOUNTER
Called patient again but did not get an answer. If he calls back he needs to know that he will now be on gabitril 4 mg 2 tabs PO BID.  To increase he will do the following:     AM  PM  Day 1-3 4mg 8mg  Day 4-on  8mg  8mg

## 2017-09-06 ENCOUNTER — HOSPITAL ENCOUNTER (OUTPATIENT)
Dept: PHYSICAL THERAPY | Age: 67
Discharge: HOME | End: 2017-09-06
Payer: OTHER GOVERNMENT

## 2017-09-06 PROCEDURE — 97110 THERAPEUTIC EXERCISES: CPT

## 2017-09-06 PROCEDURE — 97530 THERAPEUTIC ACTIVITIES: CPT

## 2017-09-06 NOTE — PROGRESS NOTES
PT DAILY TREATMENT NOTE     Patient Name: Ijeoma Rodriguez  Date:2017  : 1950  [x]  Patient  Verified  Payor:  / Plan: [facility]  RETIREES AND DEPENDENTS / Product Type: Carvel Dears /    In time:405 Out time:4:50  Total Treatment Time (min): 45  Visit #: 22 of 24    Treatment Area: Spondylolisthesis, lumbar region [M43.16]    SUBJECTIVE  Pain Level (0-10 scale): 6/10  Any medication changes, allergies to medications, adverse drug reactions, diagnosis change, or new procedure performed?: [x] No    [] Yes (see summary sheet for update)  Subjective functional status/changes:   [] No changes reported  I am content with my progress. Considering left knee surgery possibly TKA. My back is feeling better but because of the knee it is affecting it. No questions about HEP     OBJECTIVE    30 min Therapeutic Exercise:  [x] See flow sheet :   Rationale: increase ROM, increase strength and decrease pain to improve the patients ability to complete ADLs    15 min Therapeutic Activities including reevaluation    With   [x] TE   [] TA   [] neuro   [] other: Patient Education: [x] Review HEP    [] Progressed/Changed HEP based on:   [] positioning   [] body mechanics   [] transfers   [] heat/ice application    [] other:      Objective:  FOTO 46/100,further regression indicating self perceived function deficit  Subjective improvement rated at 60%  Pain with ADL 3-4/10  Pain Level (0-10 scale) post treatment: 2       []  See Plan of Care  []  See progress note/recertification  [x]  See Discharge Summary         Progress towards goals / Updated goals:Mr. Diana Quintero has been showing some improvement in strength, pain levels (average 3-4/10) and mobility but continues with marked postural asymmetries causing chronic joint and spinal strain. He has met 5 of 7 goals. Unmet goals are related to pain levels and self perception of functional deficits as per FOTO score. I recommend discharge to [facility] at this time.   Short Term Goals: To be accomplished in 3 weeks:  1. Improved postural alignment and awareness to allow self correction during ADL  Status at Eval: postural training initiated  Status at last progress note (7/20/17): met (occasionally wearing back brace, able to self correct posture for pain control)      2. Patient reports reduction in pain to <or= 5/10 with ADL and ambulatory activities  Status at Eval: pain ranging from 5-10/10  Status at last progress note (7/20/17): no change (pain 7-8/10 at worst)  Current status: met (pain 4/10 at worst) 8/15/17      Long Term Goals: To be accomplished in 6 weeks:  1. Improved FOTO score to >or= 65/100 as evidence of improved function with less pain  Status at Eval: FOTO 56/100  Status at last progress note (7/20/17): not met (FOTO: 54/100)  Current status: not met, regressed (FOTO: 49/100) 8/15/17  Status at D/C: FOTO 46/100, further regression likely related to patient's perceived functional deficit      2.  Improved proximal hip strength bilaterally to >or= 4-/5 for improved gait pattern and increased ease with dynamic ADL  Status at Eval: Glut max MMT right 2/5, left 3/5  Status at last progress note (7/20/17): Goal met in regards to isometric strength testing though patient continues with functional strength deficits at end range Ext as follows: right 3-/5, left 4/5; (isometric strength hip flexors: 4+/5 right, 5/5 left (improved 1/3 grade bilaterally), Hip extensors (sitting): 5/5 bilaterally, Hip adductors: 5/5 bilaterally, Hip abductors: 5/5 bilaterally (improved 1/3 grade bilaterally), Knee extensors: 5/5 right (improved 1/3 grade), 5/5 left, Knee flexors: 4+/5 right, 5/5 left, Ankle DF: 4/5 right, 4/5 left (improved 1/3 grade)  Current status: met (Strength hip flexors: 5/5 bilaterally, Hip extensors: 5/5 bilaterally, Hip adductors: 5/5 bilaterally, Hip abductors: 5/5 bilaterally, Knee flexors: 4+/5 right, 5/5 left, Knee extensors: 4+/5 right, 4+/5 left, Ankle DF: 4+/5 right, 4+/5 left) 8/15/17      3. Improved SLR to >or= 60 deg bilaterally and Left =Right flexibility for improved trunk neutrality and increased ease with stooping and dynamic activities  Status at Eval: SLR left 40, right 30 deg  Status at last progress note (7/20/17): met (SLR: 65 degrees. right, 70 degrees left)      4. Improved spinal mobility and ability to reach past mid shin for increased ease with all ADL  Status at Eval: marked HS flexibility deficit and FFD 8 inches with spinal FLex in standing  Status at last progress note (7/20/17): met (finger tip to floor reach:16.5 cm)      5.  Patient reports reduction in LB and LE pain to <or= 3/10 with ADL and community ambulation  Status at Eval: pain ranging from 5-10/10  Status at last progress note (7/20/17): no change (pain 7-8/10 at worst)  Current status: nearly met (pain 4/10 at worst) 8/15/17  Status at D/C: average pain with ADL 3-4/10, nearly met    PLAN        [x]  Discharge due to:essential goals met, patient reaching [address], PT, DPT 9/6/2017  4:12 PM    Future Appointments  Date Time Provider Shilo Anderson   10/6/2017 8:35 AM Speedy Pineda MD [facility]

## 2017-09-07 ENCOUNTER — APPOINTMENT (OUTPATIENT)
Dept: GENERAL RADIOLOGY | Age: 67
End: 2017-09-07
Attending: PHYSICAL MEDICINE & REHABILITATION
Payer: OTHER GOVERNMENT

## 2017-09-07 ENCOUNTER — HOSPITAL ENCOUNTER (OUTPATIENT)
Age: 67
Setting detail: OUTPATIENT SURGERY
Discharge: HOME | End: 2017-09-07
Attending: PHYSICAL MEDICINE & REHABILITATION | Admitting: PHYSICAL MEDICINE & REHABILITATION
Payer: OTHER GOVERNMENT

## 2017-09-07 VITALS
BODY MASS INDEX: 25.06 KG/M2 | WEIGHT: 179 LBS | RESPIRATION RATE: 16 BRPM | DIASTOLIC BLOOD PRESSURE: 58 MMHG | TEMPERATURE: 98.7 F | HEART RATE: 71 BPM | SYSTOLIC BLOOD PRESSURE: 99 MMHG | HEIGHT: 71 IN | OXYGEN SATURATION: 97 %

## 2017-09-07 PROCEDURE — 77030014124 HC TY EPDRL BBMI -A: Performed by: PHYSICAL MEDICINE & REHABILITATION

## 2017-09-07 PROCEDURE — 77030003543 HC NDL EPDRL BBMI -A: Performed by: PHYSICAL MEDICINE & REHABILITATION

## 2017-09-07 PROCEDURE — 76010000009 HC PAIN MGT 0 TO 30 MIN PROC: Performed by: PHYSICAL MEDICINE & REHABILITATION

## 2017-09-07 PROCEDURE — 74011000250 HC RX REV CODE- 250

## 2017-09-07 PROCEDURE — 74011250636 HC RX REV CODE- 250/636

## 2017-09-07 RX ORDER — LIDOCAINE HYDROCHLORIDE 10 MG/ML
INJECTION, SOLUTION EPIDURAL; INFILTRATION; INTRACAUDAL; PERINEURAL AS NEEDED
Status: DISCONTINUED | OUTPATIENT
Start: 2017-09-07 | End: 2017-09-07 | Stop reason: HOSPADM

## 2017-09-07 RX ORDER — DEXAMETHASONE SODIUM PHOSPHATE 100 MG/10ML
INJECTION INTRAMUSCULAR; INTRAVENOUS AS NEEDED
Status: DISCONTINUED | OUTPATIENT
Start: 2017-09-07 | End: 2017-09-07 | Stop reason: HOSPADM

## 2017-09-07 RX ORDER — DIAZEPAM 5 MG/1
5-20 TABLET ORAL ONCE
Status: DISCONTINUED | OUTPATIENT
Start: 2017-09-07 | End: 2017-09-07 | Stop reason: HOSPADM

## 2017-09-07 NOTE — PROCEDURES
Intralaminar Epidural Steroid Block Procedure Note      Patient Name: Edil Benitez    Date of Procedure: September 7, 2017    Preoperative Diagnosis: Bulge of lumbar disc without myelopathy [M51.26]  Bilateral stenosis of lateral recess of lumbar spine [M48.06]    Post Operative Diagnosis: Bulge of lumbar disc without myelopathy [M51.26]  Bilateral stenosis of lateral recess of lumbar spine [M48.06]    Procedure: L4-L5 Epidural Block     PROCEDURE:  Lumbar Epidural Block    Consent:  Informed  Consent was obtained prior to the procedure. The patient was given the opportunity to ask questions regarding the procedure and its associated risks. In addition to the potential risks associated with the procedure itself, the patient was informed both verbally and in writing of potential side effects of the use glucocorticoids. The patient appeared to comprehend the informed consent and desired to have the procedure performed. The patient was placed in the prone position on the fluoroscopy table and the back prepped and draped in the usual sterile manner. The interlaminar space was identified using fluoroscopy and the skin anesthetized with 1% Lidocaine. A #18 Tuohy Epidural needle was advanced under fluoroscopic control from a paramedian approach to the  epidural space as noted above, using the loss of resistance to fluid technique. Then, 30 mg of preservative free Dexamethasone and 4 cc of Lidocaine was slowly injected. The patient tolerated the procedure well. The injection area was cleaned and band aids applied. No excessive bleeding was noted. Patient dressed and was discharged to home with instructions.

## 2017-09-07 NOTE — PROGRESS NOTES
[facility] in [address] [place], [address] [place]  Phone: [phone]      Fax:  [phone]    Discharge Summary      Patient name: Riki Carson Start of Care: 17   Referral source: Sagrario Camacho : 1950   Medical/Treatment Diagnosis: Spondylolisthesis, lumbar region [M43.16] Onset Date: chronic with recent exacerbation   Prior Hospitalization: see medical history Provider#: [id number]   Medications: Verified on Patient Summary List      Comorbidities: CS fusion, right hip dysfunction   Prior Level of Function: Full function with intermittent pain      Visits from Start of Care: 22    Missed Visits: 0  Reporting Period : 17 to 17    Progress towards goals / Updated goals:Mr. [place] has been showing some improvement in strength, pain levels (average 3-4/10) and mobility but continues with marked postural asymmetries causing chronic joint and spinal strain. He has met 5 of 7 goals. Unmet goals are related to pain levels and self perception of functional deficits as per FOTO score. I recommend discharge to [facility] at this time. Short Term Goals: To be accomplished in 3 weeks:  1. Improved postural alignment and awareness to allow self correction during ADL  Status at Eval: postural training initiated  Status at last progress note (17): met (occasionally wearing back brace, able to self correct posture for pain control)      2. Patient reports reduction in pain to <or= 5/10 with ADL and ambulatory activities  Status at Eval: pain ranging from 5-10/10  Status at last progress note (17): no change (pain 7-8/10 at worst)  Current status: met (pain 4/10 at worst) 8/15/17      Long Term Goals: To be accomplished in 6 weeks:  1.  Improved FOTO score to >or= 65/100 as evidence of improved function with less pain  Status at Eval: FOTO 56/100  Status at last progress note (17): not met (FOTO: 54/100)  Current status: not met, regressed (FOTO: 49/100) 8/15/17  Status at D/C: FOTO 46/100, further regression likely related to patient's perceived functional deficit      2. Improved proximal hip strength bilaterally to >or= 4-/5 for improved gait pattern and increased ease with dynamic ADL  Status at Eval: Glut max MMT right 2/5, left 3/5  Status at last progress note (7/20/17): Goal met in regards to isometric strength testing though patient continues with functional strength deficits at end range Ext as follows: right 3-/5, left 4/5; (isometric strength hip flexors: 4+/5 right, 5/5 left (improved 1/3 grade bilaterally), Hip extensors (sitting): 5/5 bilaterally, Hip adductors: 5/5 bilaterally, Hip abductors: 5/5 bilaterally (improved 1/3 grade bilaterally), Knee extensors: 5/5 right (improved 1/3 grade), 5/5 left, Knee flexors: 4+/5 right, 5/5 left, Ankle DF: 4/5 right, 4/5 left (improved 1/3 grade)  Current status: met (Strength hip flexors: 5/5 bilaterally, Hip extensors: 5/5 bilaterally, Hip adductors: 5/5 bilaterally, Hip abductors: 5/5 bilaterally, Knee flexors: 4+/5 right, 5/5 left, Knee extensors: 4+/5 right, 4+/5 left, Ankle DF: 4+/5 right, 4+/5 left) 8/15/17      3. Improved SLR to >or= 60 deg bilaterally and Left =Right flexibility for improved trunk neutrality and increased ease with stooping and dynamic activities  Status at Eval: SLR left 40, right 30 deg  Status at last progress note (7/20/17): met (SLR: 65 degrees. right, 70 degrees left)      4. Improved spinal mobility and ability to reach past mid shin for increased ease with all ADL  Status at Eval: marked HS flexibility deficit and FFD 8 inches with spinal FLex in standing  Status at last progress note (7/20/17): met (finger tip to floor reach:16.5 cm)      5.  Patient reports reduction in LB and LE pain to <or= 3/10 with ADL and community ambulation  Status at Eval: pain ranging from 5-10/10  Status at last progress note (7/20/17): no change (pain 7-8/10 at worst)  Current status: nearly met (pain 4/10 at worst) 8/15/17  Status at D/C: average pain with ADL 3-4/10, nearly met      Assessment/ Summary of Care: most goals met, reached plateau    RECOMMENDATIONS:  [x]Discontinue therapy: [x]Patient has reached or is progressing toward set goals      []Patient is non-compliant or has abdicated      []Due to lack of appreciable progress towards set goals    Yon Kaur, PT 9/6/2017 10:57 PM

## 2017-09-07 NOTE — DISCHARGE INSTRUCTIONS
[facility]   (AVZQUEZ)  Dr. Denver Harlem, Dr. Mike Gardiner, Dr. David Ross not drive a car, operate heavy machinery or dangerous equipment for 24 hours. * Activity as tolerated; rest for the remainder of the day. * Resume pre-block medications including those for your family doctor. * Do not drink alcoholic beverages for 24 hours. Alcohol and the medications you have received may interact and cause an adverse reaction. * You may feel better this evening and worse tomorrow, as the numbing medications wears off and the steroid has yet to begin to work. After 48 hrs the steroid should begin to release bringing you relief. * You may shower this evening and remove any bandages. * Avoid hot tubs and heating pads for 24 hours. You may use cold packs on the procedure site as tolerated for the first 24 hours. * If a headache develops, drink plenty of fluids and rest.  Take over the counter medications for headache if needed. If the headache continues longer than 24 hours, call MD at the [address] [place] [address]. [phone]    * Continue taking pain medications as needed. * You may resume your regular diet if tolerated. Otherwise, start with sips of water and advance slowly. * If Diabetic: check your blood sugar three times a day for the next 3 days. If your sugar is greater than 300 call your family doctor. If your sugar is greater than 400, have someone transport you to the nearest Emergency Room. * If you experience any of the following problems, Please Call the [address] [place] [address] at [phone].         * Shortness of Breath    * Fever of 101 or higher    * Nausea / Vomiting    * Severe Headache    * Weakness or numbness in arms or legs that is not      resolving    * Prolonged increase in pain greater than 4 days      DISCHARGE SUMMARY from Nurse      PATIENT INSTRUCTIONS:    After oral sedation, for 24 hours or while taking prescription Narcotics:  · Limit your activities  · Do not drive and operate hazardous machinery  · Do not make important personal or business decisions  · Do  not drink alcoholic beverages  · If you have not urinated within 8 hours after discharge, please contact your surgeon on call. Report the following to your surgeon:  · Excessive pain, swelling, redness or odor of or around the surgical area  · Temperature over 101  · Nausea and vomiting lasting longer than 4 hours or if unable to take medications  · Any signs of decreased circulation or nerve impairment to extremity: change in color, persistent  numbness, tingling, coldness or increase pain  · Any questions            What to do at Home:  Recommended activity: Activity as tolerated, NO DRIVING FOR 12 Hours post injection          *  Please give a list of your current medications to your Primary Care Provider. *  Please update this list whenever your medications are discontinued, doses are      changed, or new medications (including over-the-counter products) are added. *  Please carry medication information at all times in case of emergency situations. These are general instructions for a healthy lifestyle:    No smoking/ No tobacco products/ Avoid exposure to second hand smoke    Surgeon General's Warning:  Quitting smoking now greatly reduces serious risk to your health. Obesity, smoking, and sedentary lifestyle greatly increases your risk for illness    A healthy diet, regular physical exercise & weight monitoring are important for maintaining a healthy lifestyle    You may be retaining fluid if you have a history of heart failure or if you experience any of the following symptoms:  Weight gain of 3 pounds or more overnight or 5 pounds in a week, increased swelling in our hands or feet or shortness of breath while lying flat in bed.   Please call your doctor as soon as you notice any of these symptoms; do not wait until your next office visit. Recognize signs and symptoms of STROKE:    F-face looks uneven    A-arms unable to move or move unevenly    S-speech slurred or non-existent    T-time-call 911 as soon as signs and symptoms begin-DO NOT go       Back to bed or wait to see if you get better-TIME IS BRAIN.

## 2017-10-20 ENCOUNTER — OFFICE VISIT (OUTPATIENT)
Dept: ORTHOPEDIC SURGERY | Age: 67
End: 2017-10-20

## 2017-10-20 VITALS
HEART RATE: 69 BPM | BODY MASS INDEX: 23.24 KG/M2 | SYSTOLIC BLOOD PRESSURE: 113 MMHG | DIASTOLIC BLOOD PRESSURE: 70 MMHG | WEIGHT: 171.6 LBS | RESPIRATION RATE: 14 BRPM | HEIGHT: 72 IN

## 2017-10-20 DIAGNOSIS — M96.1 CERVICAL POSTLAMINECTOMY SYNDROME: ICD-10-CM

## 2017-10-20 DIAGNOSIS — M51.36 OTHER INTERVERTEBRAL DISC DEGENERATION, LUMBAR REGION: ICD-10-CM

## 2017-10-20 DIAGNOSIS — M48.062 SPINAL STENOSIS OF LUMBAR REGION WITH NEUROGENIC CLAUDICATION: ICD-10-CM

## 2017-10-20 DIAGNOSIS — M25.562 LEFT KNEE PAIN, UNSPECIFIED CHRONICITY: ICD-10-CM

## 2017-10-20 DIAGNOSIS — M51.26 LUMBAR HERNIATED DISC: ICD-10-CM

## 2017-10-20 DIAGNOSIS — M51.26 OTHER INTERVERTEBRAL DISC DISPLACEMENT, LUMBAR REGION: ICD-10-CM

## 2017-10-20 DIAGNOSIS — M54.12 CERVICAL NEURITIS: ICD-10-CM

## 2017-10-20 DIAGNOSIS — M47.816 SPONDYLOSIS OF LUMBAR REGION WITHOUT MYELOPATHY OR RADICULOPATHY: ICD-10-CM

## 2017-10-20 DIAGNOSIS — M96.1 POSTLAMINECTOMY SYNDROME: Primary | ICD-10-CM

## 2017-10-20 RX ORDER — TIAGABINE HYDROCHLORIDE 4 MG/1
8 TABLET, FILM COATED ORAL 2 TIMES DAILY WITH MEALS
Qty: 120 TAB | Refills: 1 | Status: SHIPPED | OUTPATIENT
Start: 2017-10-20 | End: 2019-09-20 | Stop reason: SDUPTHER

## 2017-10-20 NOTE — PROGRESS NOTES
[facility]  [address], [place]   Phone: [phone]  Fax: [phone]        PROGRESS NOTE      HISTORY OF PRESENT ILLNESS:  The patient is a 74 y.o. male and was seen today for follow up of low back pain as well as distal LLE pain. Pt has complaints of limitations in standing and walking tolerance and describes symptoms consistent with stenosis. His chief complaint is that of left knee pain. Pt has additional c/o neck pain extending into the RUE with a diagnoses of cervical postlaminectomy syndrome. Pt has hx of cervical spine surgery performed by Dr. Tessie Mortimer in 2010. He is followed by Dr. Donovan Hull for his shoulder. The patient reports significant temporary relief with previous shoulder injections. Pt underwent L2/L3 epidural on 6/2/16 without relief. Pt underwent L4/5 epidural on 7/27/17 with temporary relief x 1 week. Noted, I previously had him on LYRICA which he states caused GI upset. Upon review of my note, he had failed higher doses of NEURONTIN. Pt has failed CYMBALTA as it has provided no relief of his symptoms. He reports intolerance to TOPAMAX 75 mg qhs secondary to GI upset. He reports urinary frequency but denies incontinence. He denies h/o DM. An EMG from 12/8/15 revealed a study consistent with a chronic appearing cervical radiculopathy superimposed on mild carpal tunnel syndrome per report. 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 The size and extent of the abnormality appears to be stable to slightly increased in a caudal direction compared with the previous study. There is focal enhancement involving the anterolateral cord at the C3-C4 level. Otherwise, stable lower cervical degenerative disc disease changes with right greater than left foraminal stenoses.  Right upper extremity MRI from 7/2/11 per report, abnormal appearance to the glenoid, which appears slightly dysplastic and flattened, with some mild signal abnormality. Additionally, the coracoid process appears elongated. Cystic change in the adjacent humeral head, suggesting the possibility of the tentative injury. Question if findings are due to congenital dysplasia or (remote) posttraumatic deformity. Mild tendinosis of the supraspinatus and infraspinatus tendons, without tear. The labrum is degenerative in appearance, without definite tear. Moderate cartilage loss in the glenohumeral joint, particularly along the glenoid. Trace glenohumeral joint effusion, with fluid loculated in the subcoracoid recess. Approximately 5x9x5 mm focus of low T2 signal within the subcoracoid recess, which may represent and intra-articular body. Minimal edema within the subacromial, subdeltoid bursa. Moderate acromioclavicular joint osteoarthrosis, with mild impingement. Mild biceps tenosynovitis. Preliminary reading of cervical plain films revealed ACDF C3-4 hardware intact. No acute pathology identified. Degenerative disc at C5/C6 and C6/C7. Preliminary reading of lumbar plain films revealed grade 1 anterolisthesis of L4 on L5. Pan lumbar degenerative disc disease. No acute pathology identified. Lumbar spine MRI dated 5/5/17 reviewed. Per report, multiple level degenerative changes, which resulted in neuroforaminal narrowing and central canal stenosis. At L2-L3, there was a large anterior disc osteophyte complex. At L3-L4, moderate bilateral neuroforaminal narrowing. At L4-L5, there is a 1.2 mm of anterolisthesis of L4 on L5 with no definitive pars defects. Moderate sized broad-based anterior disc osteophyte complex. Mild-to-moderate central canal stenosis. At L5-S1, mild to moderate central canal stenosis. Moderate bilateral neuroforaminal narrowing.  LT knee MRI dated 5/11/17 per report revealed edema and fluid within ACL tendon fibers, likely ganglion cyst versus low-grade partial tear. Other major ligaments appear intact. Lateral meniscus possible posterior horn tear. No definite medial meniscus tear. Patchy high-grade cartilage loss in the patellofemoral and medial compartments, grade 3-4. edema in the suprapatellar fat pad may be seen with quadriceps fat pad impingement. trace joint effusion. Minimal Hoffa's fat pad edema. Small amount of fluid surrounding the semimembranosus tendon, possibly bursitis or tracking popliteal cyst. At his last clinical appointment, patient was s/p L4/5 epidural from 7/25/17 noting temporary relief x 1 week. He elected to proceed with additional lumbar blocks. I ordered repeat block. I increased his Gabitril to 4 mg BID. The patient returns today with pain location and distribution remain unchanged. He rates pain 6/10, a slight increase relative to his prior visit (5/10). Pt underwent L4/5 epidural on 9/7/17 without benefit. He is tolerating Gabitril 4 mg BID without relief. Pt has been seeing an orthopaedic specialist for his left knee pain at the [place] and anticipates left TKR. He has now been scheduled to see Dr. Juventino Hernandez.  reviewed.        Past Medical History:   Diagnosis Date    Arthritis     Cervical postlaminectomy syndrome     Chronic neck pain     Fall     Hypertension     OA (osteoarthritis) of knee     left knee        Social History     Social History    Marital status:      Spouse name: N/A    Number of children: N/A    Years of education: N/A     Occupational History    Retired      Social History Main Topics    Smoking status: Current Every Day Smoker     Packs/day: 0.25     Years: 13.00     Types: Cigarettes    Smokeless tobacco: Never Used      Comment: 9 cigarettes per day    Alcohol use Yes    Drug use: Not on file    Sexual activity: Not Currently     Partners: Female     Other Topics Concern    Not on file     Social History Narrative       Current Outpatient Prescriptions   Medication Sig Dispense Refill    tiaGABine (GABITRIL) 4 mg tablet Take 2 Tabs by mouth two (2) times daily (with meals). 120 Tab 1    acetaminophen (TYLENOL ARTHRITIS PAIN) 650 mg CR tablet Take 650 mg by mouth every six (6) hours as needed for Pain.  Omeprazole delayed release (PRILOSEC D/R) 20 mg tablet 20 mg.      baclofen (LIORESAL) 10 mg tablet Take 20 mg by mouth three (3) times daily.  aspirin delayed-release 81 mg tablet Take  by mouth daily.  lisinopril-hydrochlorothiazide (PRINZIDE, ZESTORETIC) 20-12.5 mg per tablet Take  by mouth daily. No Known Allergies       Ambulates with a single point cane. PHYSICAL EXAMINATION    Visit Vitals    /70    Pulse 69    Resp 14    Ht 6' (1.829 m)    Wt 171 lb 9.6 oz (77.8 kg)    BMI 23.27 kg/m2       CONSTITUTIONAL: NAD, A&O x 3  SENSATION: Intact to light touch throughout  RANGE OF MOTION: The patient has full passive range of motion in all four extremities. MOTOR:  Straight Leg Raise: Negative, bilateral               Hip Flex Knee Ext Knee Flex Ankle DF GTE Ankle PF Tone   Right +4/5 +4/5 +4/5 +4/5 +4/5 +4/5 +4/5   Left +4/5 +4/5 +4/5 +4/5 +4/5 +4/5 +4/5       ASSESSMENT   Diagnoses and all orders for this visit:    1. Postlaminectomy syndrome    2. Lumbar herniated disc  -     tiaGABine (GABITRIL) 4 mg tablet; Take 2 Tabs by mouth two (2) times daily (with meals). 3. Spinal stenosis of lumbar region with neurogenic claudication  -     tiaGABine (GABITRIL) 4 mg tablet; Take 2 Tabs by mouth two (2) times daily (with meals). 4. Other intervertebral disc displacement, lumbar region    5. Other intervertebral disc degeneration, lumbar region    6. Spondylosis of lumbar region without myelopathy or radiculopathy  -     tiaGABine (GABITRIL) 4 mg tablet; Take 2 Tabs by mouth two (2) times daily (with meals). 7. Cervical postlaminectomy syndrome  -     tiaGABine (GABITRIL) 4 mg tablet;  Take 2 Tabs by mouth two (2) times daily (with meals). 8. Cervical neuritis  -     tiaGABine (GABITRIL) 4 mg tablet; Take 2 Tabs by mouth two (2) times daily (with meals). 9. Left knee pain, unspecified chronicity  -     REFERRAL TO ORTHOPEDICS          IMPRESSION AND PLAN:  Proceed with appointment with Dr. Amna Li regarding left knee pain. I will increase his Gabitril to 8 mg BID. Patient advised to call the office if intolerant to higher dose. I will see the patient back in 6 week's time or earlier if needed. Written by Milana Vincent, as dictated by Ladan Garber MD  I examined the patient, reviewed and agree with the note.

## 2017-10-20 NOTE — MR AVS SNAPSHOT
BMI and BSA Data Body Mass Index Body Surface Area  
 23.27 kg/m 2 1.99 m 2 Preferred Pharmacy Pharmacy Name Phone [facility] [address] [phone] Your Updated Medication List  
  
   
This list is accurate as of: 10/20/17 12:51 PM.  Always use your most recent med list.  
  
  
  
  
 aspirin delayed-release 81 mg tablet Take  by mouth daily. baclofen 10 mg tablet Commonly known as:  LIORESAL Take 20 mg by mouth three (3) times daily. lisinopril-hydroCHLOROthiazide 20-12.5 mg per tablet Commonly known as:  Tildon Gloss Take  by mouth daily. Omeprazole delayed release 20 mg tablet Commonly known as:  PRILOSEC D/R  
20 mg.  
  
 tiaGABine 4 mg tablet Commonly known as:  GABITRIL Take 2 Tabs by mouth two (2) times daily (with meals). TYLENOL ARTHRITIS PAIN 650 mg CR tablet Generic drug:  acetaminophen Take 650 mg by mouth every six (6) hours as needed for Pain. Prescriptions Sent to Pharmacy Refills  
 tiaGABine (GABITRIL) 4 mg tablet 1 Sig: Take 2 Tabs by mouth two (2) times daily (with meals). Class: Normal  
 Pharmacy: [facility] [address] [facility] #: [phone] Route: Oral  
  
We Performed the Following REFERRAL TO ORTHOPEDICS [PZF804 Custom] Comments:  
 Eval/Treat Left Knee Pain Follow-up Instructions Return in about 6 weeks (around 12/1/2017). Referral Information Referral ID Referred By Referred To  
  
 [id number] [facility] [facility], [address] [facility] [address] [facility] [place], [address] Phone: [phone] Fax: [phone] Visits Status Start Date End Date 1 New Request 10/20/17 10/20/18  If your referral has a status of pending review or denied, additional information will be sent to support the outcome of this decision. Introducing [facility]! [facility] introduces QuVIS patient portal. Now you can access parts of your medical record, email your doctor's office, and request medication refills online. 1. In your internet browser, go to https://[employer]. Populr/[employer] 2. Click on the First Time User? Click Here link in the Sign In box. You will see the New Member Sign Up page. 3. Enter your QuVIS Access Code exactly as it appears below. You will not need to use this code after youve completed the sign-up process. If you do not sign up before the expiration date, you must request a new code. · QuVIS Access Code: [email or web address] Expires: 11/20/2017  4:31 PM 
 
4. Enter the last four digits of your Social Security Number (xxxx) and Date of Birth (mm/dd/yyyy) as indicated and click Submit. You will be taken to the next sign-up page. 5. Create a QuVIS ID. This will be your QuVIS login ID and cannot be changed, so think of one that is secure and easy to remember. 6. Create a QuVIS password. You can change your password at any time. 7. Enter your Password Reset Question and Answer. This can be used at a later time if you forget your password. 8. Enter your e-mail address. You will receive e-mail notification when new information is available in [address] 9. Click Sign Up. You can now view and download portions of your medical record. 10. Click the Download Summary menu link to download a portable copy of your medical information. If you have questions, please visit the Frequently Asked Questions section of the QuVIS website. Remember, QuVIS is NOT to be used for urgent needs. For medical emergencies, dial 911. Now available from your iPhone and Android! Please provide this summary of care documentation to your next provider. Your primary care clinician is listed as Arianne Milan.  If you have any questions after today's visit, please call [phone].

## 2017-11-03 ENCOUNTER — OFFICE VISIT (OUTPATIENT)
Dept: ORTHOPEDIC SURGERY | Facility: CLINIC | Age: 67
End: 2017-11-03

## 2017-11-03 VITALS
DIASTOLIC BLOOD PRESSURE: 75 MMHG | WEIGHT: 173 LBS | HEIGHT: 72 IN | SYSTOLIC BLOOD PRESSURE: 120 MMHG | HEART RATE: 65 BPM | OXYGEN SATURATION: 99 % | BODY MASS INDEX: 23.43 KG/M2

## 2017-11-03 DIAGNOSIS — G89.29 CHRONIC LEFT-SIDED LOW BACK PAIN WITH LEFT-SIDED SCIATICA: ICD-10-CM

## 2017-11-03 DIAGNOSIS — M17.12 PRIMARY OSTEOARTHRITIS OF LEFT KNEE: ICD-10-CM

## 2017-11-03 DIAGNOSIS — G89.29 CHRONIC PAIN OF LEFT KNEE: Primary | ICD-10-CM

## 2017-11-03 DIAGNOSIS — M25.562 CHRONIC PAIN OF LEFT KNEE: Primary | ICD-10-CM

## 2017-11-03 DIAGNOSIS — M54.42 CHRONIC LEFT-SIDED LOW BACK PAIN WITH LEFT-SIDED SCIATICA: ICD-10-CM

## 2017-11-03 DIAGNOSIS — M17.11 PRIMARY OSTEOARTHRITIS OF RIGHT KNEE: ICD-10-CM

## 2017-11-03 DIAGNOSIS — M22.42 CHONDROMALACIA PATELLAE, LEFT: ICD-10-CM

## 2017-11-03 DIAGNOSIS — M23.352 LATERAL MENISCUS, POSTERIOR HORN DERANGEMENT, LEFT: ICD-10-CM

## 2017-11-03 RX ORDER — BETAMETHASONE SODIUM PHOSPHATE AND BETAMETHASONE ACETATE 3; 3 MG/ML; MG/ML
3 INJECTION, SUSPENSION INTRA-ARTICULAR; INTRALESIONAL; INTRAMUSCULAR; SOFT TISSUE ONCE
Qty: 0.5 ML | Refills: 0
Start: 2017-11-03 | End: 2017-11-03

## 2017-11-03 RX ORDER — BUPIVACAINE HYDROCHLORIDE 2.5 MG/ML
4 INJECTION, SOLUTION EPIDURAL; INFILTRATION; INTRACAUDAL ONCE
Qty: 4 ML | Refills: 0
Start: 2017-11-03 | End: 2017-11-03

## 2017-11-03 NOTE — PATIENT INSTRUCTIONS
Knee Arthritis: Exercises  Your Care Instructions  Here are some examples of exercises for knee arthritis. Start each exercise slowly. Ease off the exercise if you start to have pain. Your doctor or physical therapist will tell you when you can start these exercises and which ones will work best for you. How to do the exercises  Knee flexion with heel slide    1. Lie on your back with your knees bent. 2. Slide your heel back by bending your affected knee as far as you can. Then hook your other foot around your ankle to help pull your heel even farther back. 3. Hold for about 6 seconds, then rest for up to 10 seconds. 4. Repeat 8 to 12 times. 5. Switch legs and repeat steps 1 through 4, even if only one knee is sore. Quad sets    1. Sit with your affected leg straight and supported on the floor or a firm bed. Place a small, rolled-up towel under your knee. Your other leg should be bent, with that foot flat on the floor. 2. Tighten the thigh muscles of your affected leg by pressing the back of your knee down into the towel. 3. Hold for about 6 seconds, then rest for up to 10 seconds. 4. Repeat 8 to 12 times. 5. Switch legs and repeat steps 1 through 4, even if only one knee is sore. Straight-leg raises to the front    1. Lie on your back with your good knee bent so that your foot rests flat on the floor. Your affected leg should be straight. Make sure that your low back has a normal curve. You should be able to slip your hand in between the floor and the small of your back, with your palm touching the floor and your back touching the back of your hand. 2. Tighten the thigh muscles in your affected leg by pressing the back of your knee flat down to the floor. Hold your knee straight. 3. Keeping the thigh muscles tight and your leg straight, lift your affected leg up so that your heel is about 12 inches off the floor. Hold for about 6 seconds, then lower slowly.   4. Relax for up to 10 seconds between repetitions. 5. Repeat 8 to 12 times. 6. Switch legs and repeat steps 1 through 5, even if only one knee is sore. Active knee flexion    1. Lie on your stomach with your knees straight. If your kneecap is uncomfortable, roll up a washcloth and put it under your leg just above your kneecap. 2. Lift the foot of your affected leg by bending the knee so that you bring the foot up toward your buttock. If this motion hurts, try it without bending your knee quite as far. This may help you avoid any painful motion. 3. Slowly move your leg up and down. 4. Repeat 8 to 12 times. 5. Switch legs and repeat steps 1 through 4, even if only one knee is sore. Quadriceps stretch (facedown)    1. Lie flat on your stomach, and rest your face on the floor. 2. Wrap a towel or belt strap around the lower part of your affected leg. Then use the towel or belt strap to slowly pull your heel toward your buttock until you feel a stretch. 3. Hold for about 15 to 30 seconds, then relax your leg against the towel or belt strap. 4. Repeat 2 to 4 times. 5. Switch legs and repeat steps 1 through 4, even if only one knee is sore. Stationary exercise bike    1. If you do not have a stationary exercise bike at home, you can find one to ride at your local health club or community center. 2. Adjust the height of the bike seat so that your knee is slightly bent when your leg is extended downward. If your knee hurts when the pedal reaches the top, you can raise the seat so that your knee does not bend as much. 3. Start slowly. At first, try to do 5 to 10 minutes of cycling with little to no resistance. Then increase your time and the resistance bit by bit until you can do 20 to 30 minutes without pain. 4. If you start to have pain, rest your knee until your pain gets back to the level that is normal for you. Or cycle for less time or with less effort. Follow-up care is a key part of your treatment and safety.  Be sure to make and go to all appointments, and call your doctor if you are having problems. It's also a good idea to know your test results and keep a list of the medicines you take. Where can you learn more? Go to http://raúl-leisa.info/. Enter C159 in the search box to learn more about \"Knee Arthritis: Exercises. \"  Current as of: March 21, 2017  Content Version: 11.4  © 2006-2017 [employer]. Care instructions adapted under license by [employer] (which disclaims liability or warranty for this information). If you have questions about a medical condition or this instruction, always ask your healthcare professional. [place] [address] any warranty or liability for your use of this information. Joint Injections: Care Instructions  Your Care Instructions  Joint injections are shots into a joint, such as the knee. They may be used to put in medicines, such as pain relievers. Or they can be used to take out fluid. Sometimes the fluid is tested in a lab. This can help find the cause of a joint problem. A corticosteroid, or steroid, shot is used to reduce inflammation in tendons or joints. It is often used to treat problems such as arthritis, tendinitis, and bursitis. Steroids can be injected directly into a painful, inflamed joint. They can also help reduce inflammation of a bursa. A bursa is a sac of fluid. It cushions and lubricates areas where tendons, ligaments, skin, muscles, or bones rub against each other. A steroid shot can sometimes help with short-term pain relief when other treatments haven't worked. If steroid shots help, pain may improve for weeks or months. Follow-up care is a key part of your treatment and safety. Be sure to make and go to all appointments, and call your doctor if you are having problems. It's also a good idea to know your test results and keep a list of the medicines you take. How can you care for yourself at home?   · Put ice or a cold pack on the area for 10 to 20 minutes at a time. Put a thin cloth between the ice and your skin. · Take anti-inflammatory medicines to reduce pain, swelling, or inflammation. These include ibuprofen (Advil, Motrin) and naproxen (Aleve). Read and follow all instructions on the label. · Avoid strenuous activities for several days, especially those that put stress on the area where you got the shot. · If you have dressings over the area, keep them clean and dry. You may remove them when your doctor tells you to. When should you call for help? Call your doctor now or seek immediate medical care if:  ? · You have signs of infection, such as:  ¨ Increased pain, swelling, warmth, or redness. ¨ Red streaks leading from the site. ¨ Pus draining from the site. ¨ A fever. ? Watch closely for changes in your health, and be sure to contact your doctor if you have any problems. Where can you learn more? Go to http://raúl-leisa.info/. Enter N616 in the search box to learn more about \"Joint Injections: Care Instructions. \"  Current as of: March 21, 2017  Content Version: 11.4  © 0308-1676 [employer]. Care instructions adapted under license by [employer] (which disclaims liability or warranty for this information). If you have questions about a medical condition or this instruction, always ask your healthcare professional. [place] [address] any warranty or liability for your use of this information.

## 2017-11-03 NOTE — MR AVS SNAPSHOT
Visit Information Date & Time Provider Department Dept. Phone Encounter #  
 11/3/2017  9:45 AM Jono Maloney MD [address] [facility] [phone] Follow-up Instructions Return in about 4 weeks (around 12/1/2017). Your Appointments 12/1/2017  8:40 AM  
Follow Up with Jo Garcia MD  
[facility] ([facility]) Appt Note: Cherelle Bundy 6 WK FU  
 [address] [place] [address] [facility] [address]  
[address] [facility] Upcoming Health Maintenance Date Due Hepatitis C Screening 1950 DTaP/Tdap/Td series (1 - Tdap) 10/18/1973 FOBT Q 1 YEAR AGE 50-75 10/18/2002 ZOSTER VACCINE AGE 60> 8/18/2012 INFLUENZA AGE 9 TO ADULT 8/1/2017 GLAUCOMA SCREENING Q2Y 10/18/2017 Pneumococcal 65+ Low/Medium Risk (1 of 2 - PCV13) 10/18/2017 MEDICARE YEARLY EXAM 10/18/2017 Allergies as of 11/3/2017  Review Complete On: 11/3/2017 By: Jono Maloney MD  
 No Known Allergies Current Immunizations  Never Reviewed No immunizations on file. Not reviewed this visit You Were Diagnosed With   
  
 Codes Comments Chronic pain of left knee    -  Primary ICD-10-CM: M25.562, Y07.43 ICD-9-CM: 719.46, 338.29 Lateral meniscus, posterior horn derangement, left     ICD-10-CM: M23.352 ICD-9-CM: 717.43 Primary osteoarthritis of left knee     ICD-10-CM: M17.12 
ICD-9-CM: 715.16 moderately severe Primary osteoarthritis of right knee     ICD-10-CM: M17.11 ICD-9-CM: 715.16 moderate Chronic left-sided low back pain with left-sided sciatica     ICD-10-CM: M54.42, G89.29 ICD-9-CM: 724.2, 724.3, 338.29 Chondromalacia patellae, left     ICD-10-CM: M22.42 
ICD-9-CM: 717.7 Vitals BP Pulse Height(growth percentile) Weight(growth percentile) SpO2 BMI  
 120/75 65 6' (1.829 m) 173 lb (78.5 kg) 99% 23.46 kg/m2 Smoking Status Current Every Day Smoker BMI and BSA Data Body Mass Index Body Surface Area  
 23.46 kg/m 2 2 m 2 Preferred Pharmacy Pharmacy Name Phone [phone] [phone] Your Updated Medication List  
  
   
This list is accurate as of: 11/3/17 10:28 AM.  Always use your most recent med list.  
  
  
  
  
 aspirin delayed-release 81 mg tablet Take  by mouth daily. baclofen 10 mg tablet Commonly known as:  LIORESAL Take 20 mg by mouth three (3) times daily. betamethasone 6 mg/mL injection Commonly known as:  CELESTONE SOLUSPAN  
0.5 mL by Intra artICUlar route once for 1 dose. bupivacaine (PF) 0.25 % (2.5 mg/mL) injection Commonly known as:  MARCAINE (PF)  
4 mL by Intra artICUlar route once for 1 dose. lisinopril-hydroCHLOROthiazide 20-12.5 mg per tablet Commonly known as:  Eric Patrick Take  by mouth daily. Omeprazole delayed release 20 mg tablet Commonly known as:  PRILOSEC D/R  
20 mg.  
  
 tiaGABine 4 mg tablet Commonly known as:  GABITRIL Take 2 Tabs by mouth two (2) times daily (with meals). TYLENOL ARTHRITIS PAIN 650 mg Octavio Whitaker Generic drug:  acetaminophen Take 650 mg by mouth every six (6) hours as needed for Pain. We Performed the Following AMB POC X-RAY KNEE 3 VIEW [99486 CPT(R)] BETAMETHASONE ACETATE & SODIUM PHOSPHATE INJECTION 3 MG EA. [ [facility]] DRAIN/INJECT LARGE JOINT/BURSA [id number] CPT(R)] PROCEDURE AUTHORIZATION TO  [KDD7207 Custom] Comments:  
 Need authorization for Euflexxa left Follow-up Instructions Return in about 4 weeks (around 12/1/2017). Patient Instructions Knee Arthritis: Exercises Your Care Instructions Here are some examples of exercises for knee arthritis. Start each exercise slowly. Ease off the exercise if you start to have pain. Your doctor or physical therapist will tell you when you can start these exercises and which ones will work best for you. How to do the exercises Knee flexion with heel slide 1. Lie on your back with your knees bent. 2. Slide your heel back by bending your affected knee as far as you can. Then hook your other foot around your ankle to help pull your heel even farther back. 3. Hold for about 6 seconds, then rest for up to 10 seconds. 4. Repeat 8 to 12 times. 5. Switch legs and repeat steps 1 through 4, even if only one knee is sore. [facility] 1. Sit with your affected leg straight and supported on the floor or a firm bed. Place a small, rolled-up towel under your knee. Your other leg should be bent, with that foot flat on the floor. 2. Tighten the thigh muscles of your affected leg by pressing the back of your knee down into the towel. 3. Hold for about 6 seconds, then rest for up to 10 seconds. 4. Repeat 8 to 12 times. 5. Switch legs and repeat steps 1 through 4, even if only one knee is sore. Straight-leg raises to the front 1. Lie on your back with your good knee bent so that your foot rests flat on the floor. Your affected leg should be straight. Make sure that your low back has a normal curve. You should be able to slip your hand in between the floor and the small of your back, with your palm touching the floor and your back touching the back of your hand. 2. Tighten the thigh muscles in your affected leg by pressing the back of your knee flat down to the floor. Hold your knee straight. 3. Keeping the thigh muscles tight and your leg straight, lift your affected leg up so that your heel is about 12 inches off the floor. Hold for about 6 seconds, then lower slowly. 4. Relax for up to 10 seconds between repetitions. 5. Repeat 8 to 12 times. 6. Switch legs and repeat steps 1 through 5, even if only one knee is sore. Active knee flexion 1. Lie on your stomach with your knees straight. If your kneecap is uncomfortable, roll up a washcloth and put it under your leg just above your kneecap. 2. Lift the foot of your affected leg by bending the knee so that you bring the foot up toward your buttock. If this motion hurts, try it without bending your knee quite as far. This may help you avoid any painful motion. 3. Slowly move your leg up and down. 4. Repeat 8 to 12 times. 5. Switch legs and repeat steps 1 through 4, even if only one knee is sore. Quadriceps stretch (facedown) 1. Lie flat on your stomach, and rest your face on the floor. 2. Wrap a towel or belt strap around the lower part of your affected leg. Then use the towel or belt strap to slowly pull your heel toward your buttock until you feel a stretch. 3. Hold for about 15 to 30 seconds, then relax your leg against the towel or belt strap. 4. Repeat 2 to 4 times. 5. Switch legs and repeat steps 1 through 4, even if only one knee is sore. Stationary exercise bike 1. If you do not have a stationary exercise bike at home, you can find one to ride at your local health club or community center. 2. Adjust the height of the bike seat so that your knee is slightly bent when your leg is extended downward. If your knee hurts when the pedal reaches the top, you can raise the seat so that your knee does not bend as much. 3. Start slowly. At first, try to do 5 to 10 minutes of cycling with little to no resistance. Then increase your time and the resistance bit by bit until you can do 20 to 30 minutes without pain. 4. If you start to have pain, rest your knee until your pain gets back to the level that is normal for you. Or cycle for less time or with less effort. Follow-up care is a key part of your treatment and safety. Be sure to make and go to all appointments, and call your doctor if you are having problems.  It's also a good idea to know your test results and keep a list of the medicines you take. Where can you learn more? Go to http://raúl-leisa.info/. Enter C159 in the search box to learn more about \"Knee Arthritis: Exercises. \" Current as of: March 21, 2017 Content Version: 11.4 © 1217-7755 [employer]. Care instructions adapted under license by [employer] (which disclaims liability or warranty for this information). If you have questions about a medical condition or this instruction, always ask your healthcare professional. [place] [address] any warranty or liability for your use of this information. Joint Injections: Care Instructions Your Care Instructions Joint injections are shots into a joint, such as the knee. They may be used to put in medicines, such as pain relievers. Or they can be used to take out fluid. Sometimes the fluid is tested in a lab. This can help find the cause of a joint problem. A corticosteroid, or steroid, shot is used to reduce inflammation in tendons or joints. It is often used to treat problems such as arthritis, tendinitis, and bursitis. Steroids can be injected directly into a painful, inflamed joint. They can also help reduce inflammation of a bursa. A bursa is a sac of fluid. It cushions and lubricates areas where tendons, ligaments, skin, muscles, or bones rub against each other. A steroid shot can sometimes help with short-term pain relief when other treatments haven't worked. If steroid shots help, pain may improve for weeks or months. Follow-up care is a key part of your treatment and safety. Be sure to make and go to all appointments, and call your doctor if you are having problems. It's also a good idea to know your test results and keep a list of the medicines you take. How can you care for yourself at home? · Put ice or a cold pack on the area for 10 to 20 minutes at a time. Put a thin cloth between the ice and your skin. · Take anti-inflammatory medicines to reduce pain, swelling, or inflammation. These include ibuprofen (Advil, Motrin) and naproxen (Aleve). Read and follow all instructions on the label. · Avoid strenuous activities for several days, especially those that put stress on the area where you got the shot. · If you have dressings over the area, keep them clean and dry. You may remove them when your doctor tells you to. When should you call for help? Call your doctor now or seek immediate medical care if: 
? · You have signs of infection, such as: 
¨ Increased pain, swelling, warmth, or redness. ¨ Red streaks leading from the site. ¨ Pus draining from the site. ¨ A fever. ? Watch closely for changes in your health, and be sure to contact your doctor if you have any problems. Where can you learn more? Go to http://raúl-leisa.info/. Enter N616 in the search box to learn more about \"Joint Injections: Care Instructions. \" Current as of: March 21, 2017 Content Version: 11.4 © 2428-9563 [employer]. Care instructions adapted under license by [employer] (which disclaims liability or warranty for this information). If you have questions about a medical condition or this instruction, always ask your healthcare professional. [address] any warranty or liability for your use of this information. Introducing [facility]! [employer] introduces [email or web address] patient portal. Now you can access parts of your medical record, email your doctor's office, and request medication refills online. 1. In your internet browser, go to [email or web address] 2. Click on the First Time User? Click Here link in the Sign In box. You will see the New Member Sign Up page. 3. Enter your [email or web address] Access Code exactly as it appears below. You will not need to use this code after youve completed the sign-up process.  If you do not sign up before the expiration date, you must request a new code. · DropMat Access Code: [email or web address] Expires: 11/20/2017  4:31 PM 
 
4. Enter the last four digits of your Social Security Number (xxxx) and Date of Birth (mm/dd/yyyy) as indicated and click Submit. You will be taken to the next sign-up page. 5. Create a DropMat ID. This will be your DropMat login ID and cannot be changed, so think of one that is secure and easy to remember. 6. Create a DropMat password. You can change your password at any time. 7. Enter your Password Reset Question and Answer. This can be used at a later time if you forget your password. 8. Enter your e-mail address. You will receive e-mail notification when new information is available in [address] 9. Click Sign Up. You can now view and download portions of your medical record. 10. Click the Download Summary menu link to download a portable copy of your medical information. If you have questions, please visit the Frequently Asked Questions section of the DropMat website. Remember, DropMat is NOT to be used for urgent needs. For medical emergencies, dial 911. Now available from your iPhone and Android! Please provide this summary of care documentation to your next provider. Your primary care clinician is listed as Bartolome Bynum. If you have any questions after today's visit, please call [phone].

## 2017-11-03 NOTE — PROGRESS NOTES
Patient: Carmencita Smith                MRN: [id number]       SSN: [id number]  YOB: 1950        AGE: 74 y.o. SEX: male    PCP: Veronica Newell MD  11/03/17    Chief Complaint   Patient presents with    Knee Pain     LEFT     HISTORY:  Carmencita Smith is a 74 y.o. male who is seen for left knee pain. He reports that his left knee pain is a result of a service related injuries. He states he has completed several courses of outpatient PT at the [facility], most recently on 10/30/17 with little relief. He is being seen by Dr. Charity Jones for his low back pain. He states his left knee pain is around a 5-6/10. He states that his left knee will sometimes give out. He notes a sharp pain radiating from his left hip all the way down his leg. Pain Assessment  11/3/2017   Location of Pain Knee   Pain Location Comment -   Location Modifiers Left   Severity of Pain 5   Quality of Pain Sharp   Quality of Pain Comment -   Duration of Pain -   Frequency of Pain Constant   Aggravating Factors Standing;Walking   Aggravating Factors Comment -   Limiting Behavior Yes   Relieving Factors Rest   Relieving Factors Comment -   Result of Injury No     Occupation, etc:  Mr. Roselyn Santacruz retired from [employer] in 2005 after 24 years in the [employer]. 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 He lives in [place] with his wife. He has 1 adult son living in [address] [place] [address] and 2 adult step children. Current weight is 173 pounds. He is 6' tall. He is not hypertensive or diabetic.      No results found for: HBA1C, HGBE8, FSX6PPIP, URL2LRZH, VKB8YNVQ  Weight Metrics 11/3/2017 10/20/2017 9/7/2017 8/23/2017 7/27/2017 6/28/2017 6/2/2016   Weight 173 lb 171 lb 9.6 oz 179 lb 179 lb 9.6 oz 179 lb 179 lb 12.8 oz 192 lb   BMI 23.46 kg/m2 23.27 kg/m2 24.97 kg/m2 25.05 kg/m2 24.97 kg/m2 25.08 kg/m2 26.79 kg/m2 Patient Active Problem List   Diagnosis Code    Primary localized osteoarthrosis, lower leg M17.10    Osteoarthritis, knee M17.10    Neck pain, acute M54.2    Postlaminectomy syndrome, cervical M96.1    Lumbago M54.5    Lumbosacral neuritis M54.17    Cervicalgia M54.2    Postlaminectomy syndrome, not elsewhere classified M96.1    Low back pain M54.5    Radiculopathy, lumbosacral region M54.17    Chronic right shoulder pain M25.511, G89.29    DDD (degenerative disc disease), cervical M50.30    Degenerative tear of glenoid labrum of right shoulder M24.111    Low back pain without sciatica M54.5    Spondylosis of lumbar region without myelopathy or radiculopathy M47.816    Lumbar neuritis M54.16    DDD (degenerative disc disease), lumbar M51.36    Lumbar discitis M46.46    Other cervical disc degeneration, unspecified cervical region M50.30    Postlaminectomy syndrome M96.1    Other articular cartilage disorders, right shoulder M24.111    Spondylolisthesis, acquired M43.10    Bulging lumbar disc M51.26    Lumbar spinal stenosis M48.061    Tear of meniscus of left knee S83.207A    Other intervertebral disc degeneration, lumbar region M51.36    Other intervertebral disc displacement, lumbar region M51.26    Cervical postlaminectomy syndrome M96.1    Cervical neuritis M54.12    Lumbar herniated disc M51.26     REVIEW OF SYSTEMS: All Below are Negative except: See HPI   Constitutional: negative for fever, chills, and weight loss. Cardiovascular: negative for chest pain, claudication, leg swelling, SOB, GATES   Gastrointestinal: Negative for pain, N/V/C/D, Blood in stool or urine, dysuria,  hematuria, incontinence, pelvic pain. Musculoskeletal: See HPI   Neurological: Negative for dizziness and weakness. Negative for headaches, Visual changes, confusion, seizures   Phychiatric/Behavioral: Negative for depression, memory loss, substance  abuse.     Extremities: Negative for hair changes, rash, or skin lesion changes. Hematologic: Negative for bleeding problems, bruising, pallor or swollen lymph  nodes   Peripheral Vascular: No calf pain, no circulation deficits. Social History     Social History    Marital status:      Spouse name: N/A    Number of children: N/A    Years of education: N/A     Occupational History    Retired      Social History Main Topics    Smoking status: Current Every Day Smoker     Packs/day: 0.25     Years: 13.00     Types: Cigarettes    Smokeless tobacco: Never Used      Comment: 9 cigarettes per day    Alcohol use Yes    Drug use: Not on file    Sexual activity: Not Currently     Partners: Female     Other Topics Concern    Not on file     Social History Narrative      No Known Allergies   Current Outpatient Prescriptions   Medication Sig    tiaGABine (GABITRIL) 4 mg tablet Take 2 Tabs by mouth two (2) times daily (with meals).  acetaminophen (TYLENOL ARTHRITIS PAIN) 650 mg CR tablet Take 650 mg by mouth every six (6) hours as needed for Pain.  Omeprazole delayed release (PRILOSEC D/R) 20 mg tablet 20 mg.    baclofen (LIORESAL) 10 mg tablet Take 20 mg by mouth three (3) times daily.  aspirin delayed-release 81 mg tablet Take  by mouth daily.  lisinopril-hydrochlorothiazide (PRINZIDE, ZESTORETIC) 20-12.5 mg per tablet Take  by mouth daily. No current facility-administered medications for this visit.        PHYSICAL EXAMINATION:  Visit Vitals    /75    Pulse 65    Ht 6' (1.829 m)    Wt 173 lb (78.5 kg)    SpO2 99%    BMI 23.46 kg/m2      ORTHO EXAMINATION:  Examination Right knee Left knee   Skin Intact Intact   Range of motion 120-0 100-5   Effusion - -   Medial joint line tenderness + +   Lateral joint line tenderness - -   Popliteal tenderness - -   Osteophytes palpable - +, medial and lateral   Nazarios - -   Patella crepitus - -   Anterior drawer - -   Lateral laxity - -   Medial laxity - -   Varus deformity - -   Valgus deformity - -   Pretibial edema - -   Calf tenderness - -   Wearing a soft knee brace  Ambulates using a tri-pod cane in his left hand    PROCEDURE:  After discussing treatment options, patient's left knee was injected with 4 cc Marcaine and 1/2 cc Celestone. Chart reviewed for the following:   July Araiza MD, have reviewed the History, Physical and updated the Allergic reactions for [address] performed immediately prior to start of procedure:  July Araiza MD, have performed the following reviews on [facility] prior to the start of the procedure:            * Patient was identified by name and date of birth   * Agreement on procedure being performed was verified  * Risks and Benefits explained to the patient  * Procedure site verified and marked as necessary  * Patient was positioned for comfort  * Consent was obtained     Time: 10:19 AM     Date of procedure: 11/3/2017    Procedure performed by:  Mark Simms MD    Mr. Timothy Jaquez tolerated the procedure well with no complications. MRI LEFT KNEE WO CONT 5/11/17  IMPRESSION:   1. Edema and fluid within ACL tendon fibers, likely ganglion cyst versus low-grade partial tear. Other major ligaments appear intact.   -Lateral meniscus possible posterior horn tear. No definite medial meniscus tear. 2. Patchy high-grade cartilage loss in the patellofemoral and medial compartments, grade 3-4. 3. Edema in the suprapatellar fat pad may be seen with quadriceps fat pad impingement. Trace joint effusion. Minimal Hoffa's fat pad edema.   -Small amount of fluid surrounding the semimembranosus tendon, possibly bursitis or tracking popliteal cyst.     RADIOGRAPHS:  XR LEFT KNEE 11/3/17  IMPRESSION:  Three views - No fractures, no effusion, L>R medial joint space narrowing, + medial and lateral osteophytes present. Moderately severe L, moderate R    IMPRESSION:      ICD-10-CM ICD-9-CM    1.  Chronic pain of left knee M25.562 719.46 AMB POC X-RAY KNEE 3 VIEW    G89.29 338.29 PROCEDURE AUTHORIZATION TO       betamethasone (CELESTONE SOLUSPAN) 6 mg/mL injection      BETAMETHASONE ACETATE & SODIUM PHOSPHATE INJECTION 3 MG EA.      DRAIN/INJECT LARGE JOINT/BURSA      bupivacaine, PF, (MARCAINE, PF,) 0.25 % (2.5 mg/mL) injection   2. Lateral meniscus, posterior horn derangement, left M23.352 717.43 PROCEDURE AUTHORIZATION TO       betamethasone (CELESTONE SOLUSPAN) 6 mg/mL injection      BETAMETHASONE ACETATE & SODIUM PHOSPHATE INJECTION 3 MG EA.      DRAIN/INJECT LARGE JOINT/BURSA      bupivacaine, PF, (MARCAINE, PF,) 0.25 % (2.5 mg/mL) injection   3. Primary osteoarthritis of left knee M17.12 715.16 PROCEDURE AUTHORIZATION TO       betamethasone (CELESTONE SOLUSPAN) 6 mg/mL injection      BETAMETHASONE ACETATE & SODIUM PHOSPHATE INJECTION 3 MG EA.      DRAIN/INJECT LARGE JOINT/BURSA      bupivacaine, PF, (MARCAINE, PF,) 0.25 % (2.5 mg/mL) injection    moderately severe   4. Primary osteoarthritis of right knee M17.11 715.16     moderate   5. Chronic left-sided low back pain with left-sided sciatica M54.42 724.2     G89.29 724.3      338.29    6. Chondromalacia patellae, left M22.42 717.7 PROCEDURE AUTHORIZATION TO       betamethasone (CELESTONE SOLUSPAN) 6 mg/mL injection      BETAMETHASONE ACETATE & SODIUM PHOSPHATE INJECTION 3 MG EA.      DRAIN/INJECT LARGE JOINT/BURSA      bupivacaine, PF, (MARCAINE, PF,) 0.25 % (2.5 mg/mL) injection     PLAN:  After discussing treatment options, patient's left knee was injected with 4 cc Marcaine and 1/2 cc Celestone. He will follow up as needed. Consider visco supplementation if pain continues.       Scribed by Yulissa Quinn ([facility]) as dictated by Bhavik Shaffer MD

## 2017-12-01 ENCOUNTER — OFFICE VISIT (OUTPATIENT)
Dept: ORTHOPEDIC SURGERY | Facility: CLINIC | Age: 67
End: 2017-12-01

## 2017-12-01 VITALS
DIASTOLIC BLOOD PRESSURE: 64 MMHG | SYSTOLIC BLOOD PRESSURE: 115 MMHG | WEIGHT: 171.6 LBS | TEMPERATURE: 97.4 F | HEART RATE: 66 BPM | HEIGHT: 72 IN | OXYGEN SATURATION: 99 % | BODY MASS INDEX: 23.24 KG/M2 | RESPIRATION RATE: 18 BRPM

## 2017-12-01 DIAGNOSIS — M23.352 LATERAL MENISCUS, POSTERIOR HORN DERANGEMENT, LEFT: ICD-10-CM

## 2017-12-01 DIAGNOSIS — M22.42 CHONDROMALACIA PATELLAE, LEFT: ICD-10-CM

## 2017-12-01 DIAGNOSIS — M17.12 PRIMARY OSTEOARTHRITIS OF LEFT KNEE: Primary | ICD-10-CM

## 2017-12-01 DIAGNOSIS — M25.562 CHRONIC PAIN OF LEFT KNEE: ICD-10-CM

## 2017-12-01 DIAGNOSIS — M25.551 RIGHT HIP PAIN: ICD-10-CM

## 2017-12-01 DIAGNOSIS — G89.29 CHRONIC PAIN OF LEFT KNEE: ICD-10-CM

## 2017-12-01 DIAGNOSIS — M70.61 TROCHANTERIC BURSITIS OF RIGHT HIP: ICD-10-CM

## 2017-12-01 RX ORDER — HYALURONATE SODIUM 10 MG/ML
2 SYRINGE (ML) INTRAARTICULAR ONCE
Qty: 2 ML | Refills: 0
Start: 2017-12-01 | End: 2017-12-01

## 2017-12-01 RX ORDER — BUPIVACAINE HYDROCHLORIDE 2.5 MG/ML
4 INJECTION, SOLUTION EPIDURAL; INFILTRATION; INTRACAUDAL ONCE
Qty: 4 ML | Refills: 0
Start: 2017-12-01 | End: 2017-12-01

## 2017-12-01 RX ORDER — BETAMETHASONE SODIUM PHOSPHATE AND BETAMETHASONE ACETATE 3; 3 MG/ML; MG/ML
6 INJECTION, SUSPENSION INTRA-ARTICULAR; INTRALESIONAL; INTRAMUSCULAR; SOFT TISSUE ONCE
Qty: 0.5 ML | Refills: 0
Start: 2017-12-01 | End: 2017-12-01

## 2017-12-01 NOTE — PROGRESS NOTES
Patient: Christianne Cornejo                MRN: [id number]       SSN: [id number]  YOB: 1950        AGE: 74 y.o. SEX: male    PCP: Shelton Costa MD  12/01/17    CC: LEFT KNEE AND RIGHT HIP PAIN     HISTORY:  Christianne Cornejo is a 74 y.o. male who is seen for left knee, right hip and low back pain. He reports increased low back and right hip pain recently. He states that he has been overlooking his right hip pain because of his knee and back issues. He reports that he injured his hip in 2010 when he fell. He reports that his left knee pain is a result of a service related injuries. He states he has completed several courses of outpatient PT at the [place], most recently on 10/30/17 with little relief. He is being seen by Dr. Moreno Panda for his low back pain. He states his left knee pain is around a 5-6/10. He states that his left knee will sometimes give out. He notes a sharp pain radiating from his left hip all the way down his leg. She is s/p cervical fusion in 2010. Pain Assessment  12/1/2017   Location of Pain Knee   Pain Location Comment -   Location Modifiers Left   Severity of Pain 6   Quality of Pain Aching; Sharp   Quality of Pain Comment -   Duration of Pain Persistent   Frequency of Pain Intermittent   Aggravating Factors Walking   Aggravating Factors Comment -   Limiting Behavior Yes   Relieving Factors Heat   Relieving Factors Comment -   Result of Injury No     Occupation, etc:  Mr. Timothy Jaquez retired from [employer] in 2005 after 24 years in the [employer]. 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 He lives in [place] with his wife. He has 1 adult son living in [place] and 2 adult step children. Current weight is 173 pounds. He is 6' tall. He is not hypertensive or diabetic.      No results found for: HBA1C, HGBE8, CYT6SKJP, LPC0JUKP, ABD5FCJB  Weight Metrics 12/1/2017 11/3/2017 10/20/2017 9/7/2017 8/23/2017 7/27/2017 6/28/2017   Weight 171 lb 9.6 oz 173 lb 171 lb 9.6 oz 179 lb 179 lb 9.6 oz 179 lb 179 lb 12.8 oz   BMI 23.27 kg/m2 23.46 kg/m2 23.27 kg/m2 24.97 kg/m2 25.05 kg/m2 24.97 kg/m2 25.08 kg/m2       Patient Active Problem List   Diagnosis Code    Primary localized osteoarthrosis, lower leg M17.10    Osteoarthritis, knee M17.10    Neck pain, acute M54.2    Postlaminectomy syndrome, cervical M96.1    Lumbago M54.5    Lumbosacral neuritis M54.17    Cervicalgia M54.2    Postlaminectomy syndrome, not elsewhere classified M96.1    Low back pain M54.5    Radiculopathy, lumbosacral region M54.17    Chronic right shoulder pain M25.511, G89.29    DDD (degenerative disc disease), cervical M50.30    Degenerative tear of glenoid labrum of right shoulder M24.111    Low back pain without sciatica M54.5    Spondylosis of lumbar region without myelopathy or radiculopathy M47.816    Lumbar neuritis M54.16    DDD (degenerative disc disease), lumbar M51.36    Lumbar discitis M46.46    Other cervical disc degeneration, unspecified cervical region M50.30    Postlaminectomy syndrome M96.1    Other articular cartilage disorders, right shoulder M24.111    Spondylolisthesis, acquired M43.10    Bulging lumbar disc M51.26    Lumbar spinal stenosis M48.061    Tear of meniscus of left knee S83.207A    Other intervertebral disc degeneration, lumbar region M51.36    Other intervertebral disc displacement, lumbar region M51.26    Cervical postlaminectomy syndrome M96.1    Cervical neuritis M54.12    Lumbar herniated disc M51.26     REVIEW OF SYSTEMS: All Below are Negative except: See HPI   Constitutional: negative for fever, chills, and weight loss.    Cardiovascular: negative for chest pain, claudication, leg swelling, SOB, GATES   Gastrointestinal: Negative for pain, N/V/C/D, Blood in stool or urine, dysuria,  hematuria, incontinence, pelvic pain.   Musculoskeletal: See HPI   Neurological: Negative for dizziness and weakness. Negative for headaches, Visual changes, confusion, seizures   Phychiatric/Behavioral: Negative for depression, memory loss, substance  abuse. Extremities: Negative for hair changes, rash, or skin lesion changes. Hematologic: Negative for bleeding problems, bruising, pallor or swollen lymph  nodes   Peripheral Vascular: No calf pain, no circulation deficits. Social History     Social History    Marital status:      Spouse name: N/A    Number of children: N/A    Years of education: N/A     Occupational History    Retired      Social History Main Topics    Smoking status: Current Every Day Smoker     Packs/day: 0.25     Years: 13.00     Types: Cigarettes    Smokeless tobacco: Never Used      Comment: 9 cigarettes per day    Alcohol use Yes    Drug use: Not on file    Sexual activity: Not Currently     Partners: Female     Other Topics Concern    Not on file     Social History Narrative      No Known Allergies   Current Outpatient Prescriptions   Medication Sig    tiaGABine (GABITRIL) 4 mg tablet Take 2 Tabs by mouth two (2) times daily (with meals).  acetaminophen (TYLENOL ARTHRITIS PAIN) 650 mg CR tablet Take 650 mg by mouth every six (6) hours as needed for Pain.  Omeprazole delayed release (PRILOSEC D/R) 20 mg tablet 20 mg.    baclofen (LIORESAL) 10 mg tablet Take 20 mg by mouth three (3) times daily.  aspirin delayed-release 81 mg tablet Take  by mouth daily.  lisinopril-hydrochlorothiazide (PRINZIDE, ZESTORETIC) 20-12.5 mg per tablet Take  by mouth daily. No current facility-administered medications for this visit.        PHYSICAL EXAMINATION:  Visit Vitals    /64    Pulse 66    Temp 97.4 °F (36.3 °C) (Oral)    Resp 18    Ht 6' (1.829 m)    Wt 171 lb 9.6 oz (77.8 kg)    SpO2 99%    BMI 23.27 kg/m2      ORTHO EXAMINATION:  Examination Right hip Left hip   Skin Intact Intact   External Rotation ROM 20 20   Internal Rotation ROM 10 10   Trochanteric tenderness +, posterior -   Hip flexion contracture - -   Antalgic gait - -   Trendelenberg sign - -   Lumbar tenderness - -   Straight leg raise - -   Calf tenderness - -   Neurovascular Intact Intact       Examination Right knee Left knee   Skin Intact Intact   Range of motion 120-0 100-5   Effusion - -   Medial joint line tenderness + +   Lateral joint line tenderness - -   Popliteal tenderness - -   Osteophytes palpable - +, medial and lateral   Nazarios - -   Patella crepitus - -   Anterior drawer - -   Lateral laxity - -   Medial laxity - -   Varus deformity - -   Valgus deformity - -   Pretibial edema - -   Calf tenderness - -   Wearing a soft knee brace  Ambulates using a tri-pod cane in his left hand    PROCEDURE:  After discussing treatment options, patient's right hip was injected with 4 cc Marcaine and 1/2 cc Celestone. After discussing treatment options, patient's left knee was injected with 2 cc of Euflexxa. Chart reviewed for the following:   Francisco Mcmahno MD, have reviewed the History, Physical and updated the Allergic reactions for [address] performed immediately prior to start of procedure:  Francisco Mcmahon MD, have performed the following reviews on [place] [address] prior to the start of the procedure:            * Patient was identified by name and date of birth   * Agreement on procedure being performed was verified  * Risks and Benefits explained to the patient  * Procedure site verified and marked as necessary  * Patient was positioned for comfort  * Consent was obtained     Time: 9:40 AM     Date of procedure: 12/1/2017    Procedure performed by:  Jie Valencia MD    Mr. Tasha Zurita tolerated the procedure well with no complications.     MRI LEFT KNEE WO CONT 5/11/17  IMPRESSION:   1. Edema and fluid within ACL tendon fibers, likely ganglion cyst versus low-grade partial tear. Other major ligaments appear intact.   -Lateral meniscus possible posterior horn tear. No definite medial meniscus tear. 2. Patchy high-grade cartilage loss in the patellofemoral and medial compartments, grade 3-4. 3. Edema in the suprapatellar fat pad may be seen with quadriceps fat pad impingement. Trace joint effusion. Minimal Hoffa's fat pad edema.   -Small amount of fluid surrounding the semimembranosus tendon, possibly bursitis or tracking popliteal cyst.     RADIOGRAPHS:  XR RIGHT HIP 12/1/17  IMPRESSION:  AP pelvis and two views - No fractures, no joint space narrowing, no osteophytes present. XR LEFT KNEE 11/3/17  IMPRESSION:  Three views - No fractures, no effusion, L>R medial joint space narrowing, + medial and lateral osteophytes present. Moderately severe L, moderate R    IMPRESSION:      ICD-10-CM ICD-9-CM    1. Primary osteoarthritis of left knee M17.12 715.16 ND DRAIN/INJECT LARGE JOINT/BURSA      EUFLEXXA INJECTION PER DOSE      sodium hyaluronate (SUPARTZ FX/HYALGAN/GENIVSC) 10 mg/mL syrg injection   2. Lateral meniscus, posterior horn derangement, left M23.352 717.43 ND DRAIN/INJECT LARGE JOINT/BURSA      EUFLEXXA INJECTION PER DOSE      sodium hyaluronate (SUPARTZ FX/HYALGAN/GENIVSC) 10 mg/mL syrg injection   3. Chronic pain of left knee M25.562 719.46 ND DRAIN/INJECT LARGE JOINT/BURSA    G89.29 338.29 EUFLEXXA INJECTION PER DOSE      sodium hyaluronate (SUPARTZ FX/HYALGAN/GENIVSC) 10 mg/mL syrg injection   4. Chondromalacia patellae, left M22.42 717.7 ND DRAIN/INJECT LARGE JOINT/BURSA      EUFLEXXA INJECTION PER DOSE      sodium hyaluronate (SUPARTZ FX/HYALGAN/GENIVSC) 10 mg/mL syrg injection     PLAN:  After discussing treatment options, patient's right hip was injected with 4 cc Marcaine and 1/2 cc Celestone. After discussing treatment options, patient's left knee was injected with 2 cc of Euflexxa. He will follow up in one week for her second Euflexxa injection. Scribed by Micki Rendon ([facility]) as dictated by Luis Smith MD

## 2017-12-01 NOTE — PATIENT INSTRUCTIONS
Knee Arthritis: Exercises  Your Care Instructions  Here are some examples of exercises for knee arthritis. Start each exercise slowly. Ease off the exercise if you start to have pain. Your doctor or physical therapist will tell you when you can start these exercises and which ones will work best for you. How to do the exercises  Knee flexion with heel slide    1. Lie on your back with your knees bent. 2. Slide your heel back by bending your affected knee as far as you can. Then hook your other foot around your ankle to help pull your heel even farther back. 3. Hold for about 6 seconds, then rest for up to 10 seconds. 4. Repeat 8 to 12 times. 5. Switch legs and repeat steps 1 through 4, even if only one knee is sore. Quad sets    1. Sit with your affected leg straight and supported on the floor or a firm bed. Place a small, rolled-up towel under your knee. Your other leg should be bent, with that foot flat on the floor. 2. Tighten the thigh muscles of your affected leg by pressing the back of your knee down into the towel. 3. Hold for about 6 seconds, then rest for up to 10 seconds. 4. Repeat 8 to 12 times. 5. Switch legs and repeat steps 1 through 4, even if only one knee is sore. Straight-leg raises to the front    1. Lie on your back with your good knee bent so that your foot rests flat on the floor. Your affected leg should be straight. Make sure that your low back has a normal curve. You should be able to slip your hand in between the floor and the small of your back, with your palm touching the floor and your back touching the back of your hand. 2. Tighten the thigh muscles in your affected leg by pressing the back of your knee flat down to the floor. Hold your knee straight. 3. Keeping the thigh muscles tight and your leg straight, lift your affected leg up so that your heel is about 12 inches off the floor. Hold for about 6 seconds, then lower slowly.   4. Relax for up to 10 seconds between repetitions. 5. Repeat 8 to 12 times. 6. Switch legs and repeat steps 1 through 5, even if only one knee is sore. Active knee flexion    1. Lie on your stomach with your knees straight. If your kneecap is uncomfortable, roll up a washcloth and put it under your leg just above your kneecap. 2. Lift the foot of your affected leg by bending the knee so that you bring the foot up toward your buttock. If this motion hurts, try it without bending your knee quite as far. This may help you avoid any painful motion. 3. Slowly move your leg up and down. 4. Repeat 8 to 12 times. 5. Switch legs and repeat steps 1 through 4, even if only one knee is sore. Quadriceps stretch (facedown)    1. Lie flat on your stomach, and rest your face on the floor. 2. Wrap a towel or belt strap around the lower part of your affected leg. Then use the towel or belt strap to slowly pull your heel toward your buttock until you feel a stretch. 3. Hold for about 15 to 30 seconds, then relax your leg against the towel or belt strap. 4. Repeat 2 to 4 times. 5. Switch legs and repeat steps 1 through 4, even if only one knee is sore. Stationary exercise bike    1. If you do not have a stationary exercise bike at home, you can find one to ride at your local health club or community center. 2. Adjust the height of the bike seat so that your knee is slightly bent when your leg is extended downward. If your knee hurts when the pedal reaches the top, you can raise the seat so that your knee does not bend as much. 3. Start slowly. At first, try to do 5 to 10 minutes of cycling with little to no resistance. Then increase your time and the resistance bit by bit until you can do 20 to 30 minutes without pain. 4. If you start to have pain, rest your knee until your pain gets back to the level that is normal for you. Or cycle for less time or with less effort. Follow-up care is a key part of your treatment and safety.  Be sure to make and go to all appointments, and call your doctor if you are having problems. It's also a good idea to know your test results and keep a list of the medicines you take. Where can you learn more? Go to http://raúl-leisa.info/. Enter C159 in the search box to learn more about \"Knee Arthritis: Exercises. \"  Current as of: March 21, 2017  Content Version: 11.4  © 2006-2017 [employer]. Care instructions adapted under license by [employer] (which disclaims liability or warranty for this information). If you have questions about a medical condition or this instruction, always ask your healthcare professional. [address] any warranty or liability for your use of this information. Hyaluronic Acid (By injection)   Hyaluronic Acid (ibm-rc-cgc-ON-ate AS-id)  Treats severe pain in your knee due to osteoarthritis. Brand Name(s): Euflexxa, Gel-One, GelSyn-3, GenVisc 850, Hyalgan, Hymovis, Monovisc, Orthovisc, Supartz FX, Visco-3   There may be other brand names for this medicine. When This Medicine Should Not Be Used: This medicine is not right for everyone. You should not receive it if you had an allergic reaction to hyaluronic acid or if you have a bleeding disorder. How to Use This Medicine:   Injectable  · Your doctor will tell you how many injections you will need. This medicine is injected into your knee joint. · A nurse or other health provider will give you this medicine. Drugs and Foods to Avoid:      Ask your doctor or pharmacist before using any other medicine, including over-the-counter medicines, vitamins, and herbal products. Warnings While Using This Medicine:   · Tell your doctor if you are pregnant or breastfeeding, or if you have any allergies, including to birds, feathers, or eggs. · Rest your knee for 48 hours after you receive an injection. Do not do strenuous, weightbearing activities, such as jogging or tennis.  Avoid activities that keep you standing for longer than 1 hour. Possible Side Effects While Using This Medicine:   Call your doctor right away if you notice any of these side effects:  · Allergic reaction: Itching or hives, swelling in your face or hands, swelling or tingling in your mouth or throat, chest tightness, trouble breathing  If you notice these less serious side effects, talk with your doctor:   · Mild increase in pain or swelling in your knee  · Pain, redness, or swelling where the medicine is injected  If you notice other side effects that you think are caused by this medicine, tell your doctor. Call your doctor for medical advice about side effects. You may report side effects to FDA at 2-328-FDA-1731  © 2017 [facility] Information is for End User's use only and may not be sold, redistributed or otherwise used for commercial purposes. The above information is an  only. It is not intended as medical advice for individual conditions or treatments. Talk to your doctor, nurse or pharmacist before following any medical regimen to see if it is safe and effective for you.

## 2017-12-08 ENCOUNTER — OFFICE VISIT (OUTPATIENT)
Dept: ORTHOPEDIC SURGERY | Facility: CLINIC | Age: 67
End: 2017-12-08

## 2017-12-08 VITALS
TEMPERATURE: 97.9 F | SYSTOLIC BLOOD PRESSURE: 114 MMHG | HEIGHT: 72 IN | RESPIRATION RATE: 18 BRPM | BODY MASS INDEX: 23.16 KG/M2 | HEART RATE: 74 BPM | DIASTOLIC BLOOD PRESSURE: 71 MMHG | OXYGEN SATURATION: 99 % | WEIGHT: 171 LBS

## 2017-12-08 DIAGNOSIS — G89.29 CHRONIC PAIN OF LEFT KNEE: ICD-10-CM

## 2017-12-08 DIAGNOSIS — M17.12 PRIMARY OSTEOARTHRITIS OF LEFT KNEE: Primary | ICD-10-CM

## 2017-12-08 DIAGNOSIS — M22.42 CHONDROMALACIA PATELLAE, LEFT: ICD-10-CM

## 2017-12-08 DIAGNOSIS — M25.562 CHRONIC PAIN OF LEFT KNEE: ICD-10-CM

## 2017-12-08 DIAGNOSIS — M23.352 LATERAL MENISCUS, POSTERIOR HORN DERANGEMENT, LEFT: ICD-10-CM

## 2017-12-08 RX ORDER — HYALURONATE SODIUM 10 MG/ML
2 SYRINGE (ML) INTRAARTICULAR ONCE
Qty: 2 ML | Refills: 0
Start: 2017-12-08 | End: 2017-12-08

## 2017-12-08 NOTE — PATIENT INSTRUCTIONS
Knee Arthritis: Exercises  Your Care Instructions  Here are some examples of exercises for knee arthritis. Start each exercise slowly. Ease off the exercise if you start to have pain. Your doctor or physical therapist will tell you when you can start these exercises and which ones will work best for you. How to do the exercises  Knee flexion with heel slide    1. Lie on your back with your knees bent. 2. Slide your heel back by bending your affected knee as far as you can. Then hook your other foot around your ankle to help pull your heel even farther back. 3. Hold for about 6 seconds, then rest for up to 10 seconds. 4. Repeat 8 to 12 times. 5. Switch legs and repeat steps 1 through 4, even if only one knee is sore. Quad sets    1. Sit with your affected leg straight and supported on the floor or a firm bed. Place a small, rolled-up towel under your knee. Your other leg should be bent, with that foot flat on the floor. 2. Tighten the thigh muscles of your affected leg by pressing the back of your knee down into the towel. 3. Hold for about 6 seconds, then rest for up to 10 seconds. 4. Repeat 8 to 12 times. 5. Switch legs and repeat steps 1 through 4, even if only one knee is sore. Straight-leg raises to the front    1. Lie on your back with your good knee bent so that your foot rests flat on the floor. Your affected leg should be straight. Make sure that your low back has a normal curve. You should be able to slip your hand in between the floor and the small of your back, with your palm touching the floor and your back touching the back of your hand. 2. Tighten the thigh muscles in your affected leg by pressing the back of your knee flat down to the floor. Hold your knee straight. 3. Keeping the thigh muscles tight and your leg straight, lift your affected leg up so that your heel is about 12 inches off the floor. Hold for about 6 seconds, then lower slowly.   4. Relax for up to 10 seconds between repetitions. 5. Repeat 8 to 12 times. 6. Switch legs and repeat steps 1 through 5, even if only one knee is sore. Active knee flexion    1. Lie on your stomach with your knees straight. If your kneecap is uncomfortable, roll up a washcloth and put it under your leg just above your kneecap. 2. Lift the foot of your affected leg by bending the knee so that you bring the foot up toward your buttock. If this motion hurts, try it without bending your knee quite as far. This may help you avoid any painful motion. 3. Slowly move your leg up and down. 4. Repeat 8 to 12 times. 5. Switch legs and repeat steps 1 through 4, even if only one knee is sore. Quadriceps stretch (facedown)    1. Lie flat on your stomach, and rest your face on the floor. 2. Wrap a towel or belt strap around the lower part of your affected leg. Then use the towel or belt strap to slowly pull your heel toward your buttock until you feel a stretch. 3. Hold for about 15 to 30 seconds, then relax your leg against the towel or belt strap. 4. Repeat 2 to 4 times. 5. Switch legs and repeat steps 1 through 4, even if only one knee is sore. Stationary exercise bike    1. If you do not have a stationary exercise bike at home, you can find one to ride at your local health club or community center. 2. Adjust the height of the bike seat so that your knee is slightly bent when your leg is extended downward. If your knee hurts when the pedal reaches the top, you can raise the seat so that your knee does not bend as much. 3. Start slowly. At first, try to do 5 to 10 minutes of cycling with little to no resistance. Then increase your time and the resistance bit by bit until you can do 20 to 30 minutes without pain. 4. If you start to have pain, rest your knee until your pain gets back to the level that is normal for you. Or cycle for less time or with less effort. Follow-up care is a key part of your treatment and safety.  Be sure to make and go to all appointments, and call your doctor if you are having problems. It's also a good idea to know your test results and keep a list of the medicines you take. Where can you learn more? Go to http://raúl-leisa.info/. Enter C159 in the search box to learn more about \"Knee Arthritis: Exercises. \"  Current as of: March 21, 2017  Content Version: 11.4  © 2006-2017 [employer]. Care instructions adapted under license by [employer] (which disclaims liability or warranty for this information). If you have questions about a medical condition or this instruction, always ask your healthcare professional. [address] any warranty or liability for your use of this information. Hyaluronic Acid (By injection)   Hyaluronic Acid (ylj-qd-dsc-ON-ate AS-id)  Treats severe pain in your knee due to osteoarthritis. Brand Name(s): Euflexxa, Gel-One, GelSyn-3, GenVisc 850, Hyalgan, Hymovis, Monovisc, Orthovisc, Supartz FX, Visco-3   There may be other brand names for this medicine. When This Medicine Should Not Be Used: This medicine is not right for everyone. You should not receive it if you had an allergic reaction to hyaluronic acid or if you have a bleeding disorder. How to Use This Medicine:   Injectable  · Your doctor will tell you how many injections you will need. This medicine is injected into your knee joint. · A nurse or other health provider will give you this medicine. Drugs and Foods to Avoid:      Ask your doctor or pharmacist before using any other medicine, including over-the-counter medicines, vitamins, and herbal products. Warnings While Using This Medicine:   · Tell your doctor if you are pregnant or breastfeeding, or if you have any allergies, including to birds, feathers, or eggs. · Rest your knee for 48 hours after you receive an injection. Do not do strenuous, weightbearing activities, such as jogging or tennis.  Avoid activities that keep you standing for longer than 1 hour. Possible Side Effects While Using This Medicine:   Call your doctor right away if you notice any of these side effects:  · Allergic reaction: Itching or hives, swelling in your face or hands, swelling or tingling in your mouth or throat, chest tightness, trouble breathing  If you notice these less serious side effects, talk with your doctor:   · Mild increase in pain or swelling in your knee  · Pain, redness, or swelling where the medicine is injected  If you notice other side effects that you think are caused by this medicine, tell your doctor. Call your doctor for medical advice about side effects. You may report side effects to FDA at 1-062-FDA-0984  © 2017 [address] Information is for End User's use only and may not be sold, redistributed or otherwise used for commercial purposes. The above information is an  only. It is not intended as medical advice for individual conditions or treatments. Talk to your doctor, nurse or pharmacist before following any medical regimen to see if it is safe and effective for you.

## 2017-12-08 NOTE — PROGRESS NOTES
Patient: Cesario Harris                MRN: [id number]       SSN: [id number]  YOB: 1950        AGE: 74 y.o. SEX: male  Body mass index is 23.19 kg/(m^2). PCP: Kenneth Dunn MD  12/08/17    Chief Complaint   Patient presents with    Knee Pain     Left     HISTORY:  Cesario Harris is a 74 y.o. male who is seen for left knee pain. ICD-10-CM ICD-9-CM    1. Primary osteoarthritis of left knee M17.12 715.16 PA DRAIN/INJECT LARGE JOINT/BURSA      EUFLEXXA INJECTION PER DOSE      sodium hyaluronate (SUPARTZ FX/HYALGAN/GENIVSC) 10 mg/mL syrg injection   2. Lateral meniscus, posterior horn derangement, left M23.352 717.43 PA DRAIN/INJECT LARGE JOINT/BURSA      EUFLEXXA INJECTION PER DOSE      sodium hyaluronate (SUPARTZ FX/HYALGAN/GENIVSC) 10 mg/mL syrg injection   3. Chronic pain of left knee M25.562 719.46 PA DRAIN/INJECT LARGE JOINT/BURSA    G89.29 338.29 EUFLEXXA INJECTION PER DOSE      sodium hyaluronate (SUPARTZ FX/HYALGAN/GENIVSC) 10 mg/mL syrg injection   4. Chondromalacia patellae, left M22.42 717.7 PA DRAIN/INJECT LARGE JOINT/BURSA      EUFLEXXA INJECTION PER DOSE      sodium hyaluronate (SUPARTZ FX/HYALGAN/GENIVSC) 10 mg/mL syrg injection     PROCEDURE:  After discussing treatment options, Mr. Tamiko Barone left knee was injected with 2 cc of Euflexxa.      Chart reviewed for the following:   Willis Rico MD, have reviewed the History, Physical and updated the Allergic reactions for [address] performed immediately prior to start of procedure:  Willis Rico MD, have performed the following reviews on Cesario Harris prior to the start of the procedure:            * Patient was identified by name and date of birth   * Agreement on procedure being performed was verified  * Risks and Benefits explained to the patient  * Procedure site verified and marked as necessary  * Patient was positioned for comfort  * Consent was obtained     Time: 8:41 AM     Date of procedure: 12/8/2017    Procedure performed by:  Jose Valencia MD    Mr. Aixa Wang tolerated the procedure well with no complications. PLAN:  After discussing treatment options, patient's left knee was injected with 2 cc of Euflexxa. Mr. Aixa Wang will follow up in one week to complete his Euflexxa injection series.       Scribed by Ravi Tobar ([address]) as dictated by Jose Valencia MD

## 2017-12-15 ENCOUNTER — OFFICE VISIT (OUTPATIENT)
Dept: ORTHOPEDIC SURGERY | Facility: CLINIC | Age: 67
End: 2017-12-15

## 2017-12-15 VITALS
BODY MASS INDEX: 22.89 KG/M2 | HEART RATE: 73 BPM | HEIGHT: 72 IN | OXYGEN SATURATION: 99 % | DIASTOLIC BLOOD PRESSURE: 74 MMHG | WEIGHT: 169 LBS | SYSTOLIC BLOOD PRESSURE: 113 MMHG

## 2017-12-15 DIAGNOSIS — M25.562 CHRONIC PAIN OF LEFT KNEE: ICD-10-CM

## 2017-12-15 DIAGNOSIS — M22.42 CHONDROMALACIA PATELLAE, LEFT: ICD-10-CM

## 2017-12-15 DIAGNOSIS — M17.12 PRIMARY OSTEOARTHRITIS OF LEFT KNEE: Primary | ICD-10-CM

## 2017-12-15 DIAGNOSIS — G89.29 CHRONIC PAIN OF LEFT KNEE: ICD-10-CM

## 2017-12-15 DIAGNOSIS — M23.352 LATERAL MENISCUS, POSTERIOR HORN DERANGEMENT, LEFT: ICD-10-CM

## 2017-12-15 RX ORDER — HYALURONATE SODIUM 10 MG/ML
2 SYRINGE (ML) INTRAARTICULAR ONCE
Qty: 2 ML | Refills: 0
Start: 2017-12-15 | End: 2017-12-15

## 2017-12-15 NOTE — PROGRESS NOTES
Patient: Doretha Sorenson                MRN: [id number]       SSN: [id number]  YOB: 1950        AGE: 74 y.o. SEX: male  Body mass index is 22.92 kg/(m^2). PCP: Adi Rico MD  12/15/17    Chief Complaint   Patient presents with    Knee Pain     LEFT     HISTORY:  Doretha Sorenson is a 74 y.o. male who is seen for left knee pain. PROCEDURE:  After discussing treatment options, Mr. Karl Florian left knee was injected with 2 cc of Euflexxa. TIME OUT performed immediately prior to start of procedure:  Mariia Schumacher MD, have performed the following reviews on Doretha Sorenson prior to the start of the procedure:            * Patient was identified by name and date of birth   * Agreement on procedure being performed was verified  * Risks and Benefits explained to the patient  * Procedure site verified and marked as necessary  * Patient was positioned for comfort  * Consent was obtained     Time: 9:43 AM     Date of procedure: 12/15/2017    Procedure performed by:  Andreia Chapin MD    Mr. Madonna Prieto tolerated the procedure well with no complications      [id number] ICD-9-CM    1. Primary osteoarthritis of left knee M17.12 715.16 KS DRAIN/INJECT LARGE JOINT/BURSA      EUFLEXXA INJECTION PER DOSE      sodium hyaluronate (SUPARTZ FX/HYALGAN/GENIVSC) 10 mg/mL syrg injection   2. Lateral meniscus, posterior horn derangement, left M23.352 717.43 KS DRAIN/INJECT LARGE JOINT/BURSA      EUFLEXXA INJECTION PER DOSE      sodium hyaluronate (SUPARTZ FX/HYALGAN/GENIVSC) 10 mg/mL syrg injection   3. Chronic pain of left knee M25.562 719.46 KS DRAIN/INJECT LARGE JOINT/BURSA    G89.29 338.29 EUFLEXXA INJECTION PER DOSE      sodium hyaluronate (SUPARTZ FX/HYALGAN/GENIVSC) 10 mg/mL syrg injection   4.  Chondromalacia patellae, left M22.42 717.7 KS DRAIN/INJECT LARGE JOINT/BURSA      EUFLEXXA INJECTION PER DOSE      sodium hyaluronate (SUPARTZ FX/HYALGAN/GENIVSC) 10 mg/mL syrg injection PLAN:  After discussing treatment options, patient's left knee was injected with 2 cc of Euflexxa. Mr. Rishi Rodriguez will follow up PRN now that he has completed his Euflexxa injection series.       Scribed by Lulú Alcala ([address]) as dictated by Cary Braga MD

## 2017-12-15 NOTE — PATIENT INSTRUCTIONS
Knee Arthritis: Exercises  Your Care Instructions  Here are some examples of exercises for knee arthritis. Start each exercise slowly. Ease off the exercise if you start to have pain. Your doctor or physical therapist will tell you when you can start these exercises and which ones will work best for you. How to do the exercises  Knee flexion with heel slide    1. Lie on your back with your knees bent. 2. Slide your heel back by bending your affected knee as far as you can. Then hook your other foot around your ankle to help pull your heel even farther back. 3. Hold for about 6 seconds, then rest for up to 10 seconds. 4. Repeat 8 to 12 times. 5. Switch legs and repeat steps 1 through 4, even if only one knee is sore. Quad sets    1. Sit with your affected leg straight and supported on the floor or a firm bed. Place a small, rolled-up towel under your knee. Your other leg should be bent, with that foot flat on the floor. 2. Tighten the thigh muscles of your affected leg by pressing the back of your knee down into the towel. 3. Hold for about 6 seconds, then rest for up to 10 seconds. 4. Repeat 8 to 12 times. 5. Switch legs and repeat steps 1 through 4, even if only one knee is sore. Straight-leg raises to the front    1. Lie on your back with your good knee bent so that your foot rests flat on the floor. Your affected leg should be straight. Make sure that your low back has a normal curve. You should be able to slip your hand in between the floor and the small of your back, with your palm touching the floor and your back touching the back of your hand. 2. Tighten the thigh muscles in your affected leg by pressing the back of your knee flat down to the floor. Hold your knee straight. 3. Keeping the thigh muscles tight and your leg straight, lift your affected leg up so that your heel is about 12 inches off the floor. Hold for about 6 seconds, then lower slowly.   4. Relax for up to 10 seconds between repetitions. 5. Repeat 8 to 12 times. 6. Switch legs and repeat steps 1 through 5, even if only one knee is sore. Active knee flexion    1. Lie on your stomach with your knees straight. If your kneecap is uncomfortable, roll up a washcloth and put it under your leg just above your kneecap. 2. Lift the foot of your affected leg by bending the knee so that you bring the foot up toward your buttock. If this motion hurts, try it without bending your knee quite as far. This may help you avoid any painful motion. 3. Slowly move your leg up and down. 4. Repeat 8 to 12 times. 5. Switch legs and repeat steps 1 through 4, even if only one knee is sore. Quadriceps stretch (facedown)    1. Lie flat on your stomach, and rest your face on the floor. 2. Wrap a towel or belt strap around the lower part of your affected leg. Then use the towel or belt strap to slowly pull your heel toward your buttock until you feel a stretch. 3. Hold for about 15 to 30 seconds, then relax your leg against the towel or belt strap. 4. Repeat 2 to 4 times. 5. Switch legs and repeat steps 1 through 4, even if only one knee is sore. Stationary exercise bike    1. If you do not have a stationary exercise bike at home, you can find one to ride at your local health club or community center. 2. Adjust the height of the bike seat so that your knee is slightly bent when your leg is extended downward. If your knee hurts when the pedal reaches the top, you can raise the seat so that your knee does not bend as much. 3. Start slowly. At first, try to do 5 to 10 minutes of cycling with little to no resistance. Then increase your time and the resistance bit by bit until you can do 20 to 30 minutes without pain. 4. If you start to have pain, rest your knee until your pain gets back to the level that is normal for you. Or cycle for less time or with less effort. Follow-up care is a key part of your treatment and safety.  Be sure to make and go to all appointments, and call your doctor if you are having problems. It's also a good idea to know your test results and keep a list of the medicines you take. Where can you learn more? Go to http://raúl-leisa.info/. Enter C159 in the search box to learn more about \"Knee Arthritis: Exercises. \"  Current as of: March 21, 2017  Content Version: 11.4  © 2006-2017 [employer]. Care instructions adapted under license by [employer] (which disclaims liability or warranty for this information). If you have questions about a medical condition or this instruction, always ask your healthcare professional. [address] any warranty or liability for your use of this information. Hyaluronic Acid (By injection)   Hyaluronic Acid (mly-cu-eeu-ON-ate AS-id)  Treats severe pain in your knee due to osteoarthritis. Brand Name(s): Euflexxa, Gel-One, GelSyn-3, GenVisc 850, Hyalgan, Hymovis, Monovisc, Orthovisc, Supartz FX, Visco-3   There may be other brand names for this medicine. When This Medicine Should Not Be Used: This medicine is not right for everyone. You should not receive it if you had an allergic reaction to hyaluronic acid or if you have a bleeding disorder. How to Use This Medicine:   Injectable  · Your doctor will tell you how many injections you will need. This medicine is injected into your knee joint. · A nurse or other health provider will give you this medicine. Drugs and Foods to Avoid:      Ask your doctor or pharmacist before using any other medicine, including over-the-counter medicines, vitamins, and herbal products. Warnings While Using This Medicine:   · Tell your doctor if you are pregnant or breastfeeding, or if you have any allergies, including to birds, feathers, or eggs. · Rest your knee for 48 hours after you receive an injection. Do not do strenuous, weightbearing activities, such as jogging or tennis.  Avoid activities that keep you standing for longer than 1 hour. Possible Side Effects While Using This Medicine:   Call your doctor right away if you notice any of these side effects:  · Allergic reaction: Itching or hives, swelling in your face or hands, swelling or tingling in your mouth or throat, chest tightness, trouble breathing  If you notice these less serious side effects, talk with your doctor:   · Mild increase in pain or swelling in your knee  · Pain, redness, or swelling where the medicine is injected  If you notice other side effects that you think are caused by this medicine, tell your doctor. Call your doctor for medical advice about side effects. You may report side effects to FDA at 9-386-FDA-1082  © 2017 [facility] Information is for End User's use only and may not be sold, redistributed or otherwise used for commercial purposes. The above information is an  only. It is not intended as medical advice for individual conditions or treatments. Talk to your doctor, nurse or pharmacist before following any medical regimen to see if it is safe and effective for you.

## 2018-01-26 ENCOUNTER — OFFICE VISIT (OUTPATIENT)
Dept: ORTHOPEDIC SURGERY | Age: 68
End: 2018-01-26

## 2018-01-26 VITALS
HEART RATE: 72 BPM | SYSTOLIC BLOOD PRESSURE: 99 MMHG | DIASTOLIC BLOOD PRESSURE: 67 MMHG | BODY MASS INDEX: 23.84 KG/M2 | HEIGHT: 72 IN | WEIGHT: 176 LBS | RESPIRATION RATE: 16 BRPM

## 2018-01-26 DIAGNOSIS — M51.26 OTHER INTERVERTEBRAL DISC DISPLACEMENT, LUMBAR REGION: ICD-10-CM

## 2018-01-26 DIAGNOSIS — M96.1 POSTLAMINECTOMY SYNDROME: ICD-10-CM

## 2018-01-26 DIAGNOSIS — M48.062 SPINAL STENOSIS OF LUMBAR REGION WITH NEUROGENIC CLAUDICATION: Primary | ICD-10-CM

## 2018-01-26 DIAGNOSIS — M51.36 DDD (DEGENERATIVE DISC DISEASE), LUMBAR: ICD-10-CM

## 2018-01-26 NOTE — PROGRESS NOTES
[facility]  [address], [place]   Phone: [phone]  Fax: [phone]        PROGRESS NOTE      HISTORY OF PRESENT ILLNESS:  The patient is a 74 y.o. male and was seen today for follow up of low back pain as well as distal LLE pain. Pt has complaints of limitations in standing and walking tolerance and describes symptoms consistent with stenosis. His chief complaint is that of left knee pain. Pt has additional c/o neck pain extending into the RUE with a diagnoses of cervical postlaminectomy syndrome. Pt has hx of cervical spine surgery performed by Dr. Sharon Stephen in 2010. He is followed by Dr. Grayson Love for his shoulder. The patient reports significant temporary relief with previous shoulder injections. Pt underwent L2/L3 epidural on 6/2/16 without relief. Pt underwent L4/5 epidural on 7/27/17 with temporary relief x 1 week. Pt underwent L4/5 epidural on 9/7/17 without benefit. Noted, I previously had him on LYRICA which he states caused GI upset. Upon review of my note, he had failed higher doses of NEURONTIN. Pt has failed CYMBALTA as it has provided no relief of his symptoms. He reports intolerance to TOPAMAX 75 mg qhs secondary to GI upset. He reports urinary frequency but denies incontinence. He denies h/o DM. An EMG from 12/8/15 revealed a study consistent with a chronic appearing cervical radiculopathy superimposed on mild carpal tunnel syndrome per report. 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 The size and extent of the abnormality appears to be stable to slightly increased in a caudal direction compared with the previous study. There is focal enhancement involving the anterolateral cord at the C3-C4 level.  Otherwise, stable lower cervical degenerative disc disease changes with right greater than left foraminal stenoses. Right upper extremity MRI from 7/2/11 per report, abnormal appearance to the glenoid, which appears slightly dysplastic and flattened, with some mild signal abnormality. Additionally, the coracoid process appears elongated. Cystic change in the adjacent humeral head, suggesting the possibility of the tentative injury. Question if findings are due to congenital dysplasia or (remote) posttraumatic deformity. Mild tendinosis of the supraspinatus and infraspinatus tendons, without tear. The labrum is degenerative in appearance, without definite tear. Moderate cartilage loss in the glenohumeral joint, particularly along the glenoid. Trace glenohumeral joint effusion, with fluid loculated in the subcoracoid recess. Approximately 5x9x5 mm focus of low T2 signal within the subcoracoid recess, which may represent and intra-articular body. Minimal edema within the subacromial, subdeltoid bursa. Moderate acromioclavicular joint osteoarthrosis, with mild impingement. Mild biceps tenosynovitis. Preliminary reading of cervical plain films revealed ACDF C3-4 hardware intact. No acute pathology identified. Degenerative disc at C5/C6 and C6/C7. Preliminary reading of lumbar plain films revealed grade 1 anterolisthesis of L4 on L5. Pan lumbar degenerative disc disease. No acute pathology identified. Lumbar spine MRI dated 5/5/17 reviewed. Per report, multiple level degenerative changes, which resulted in neuroforaminal narrowing and central canal stenosis. At L2-L3, there was a large anterior disc osteophyte complex. At L3-L4, moderate bilateral neuroforaminal narrowing. At L4-L5, there is a 1.2 mm of anterolisthesis of L4 on L5 with no definitive pars defects. Moderate sized broad-based anterior disc osteophyte complex. Mild-to-moderate central canal stenosis. At L5-S1, mild to moderate central canal stenosis. Moderate bilateral neuroforaminal narrowing.  LT knee MRI dated 5/11/17 per report revealed edema and fluid within ACL tendon fibers, likely ganglion cyst versus low-grade partial tear. Other major ligaments appear intact. Lateral meniscus possible posterior horn tear. No definite medial meniscus tear. Patchy high-grade cartilage loss in the patellofemoral and medial compartments, grade 3-4. edema in the suprapatellar fat pad may be seen with quadriceps fat pad impingement. trace joint effusion. Minimal Hoffa's fat pad edema. Small amount of fluid surrounding the semimembranosus tendon, possibly bursitis or tracking popliteal cyst. At his last clinical appointment, patient should proceed with appointment with Dr. Tejal Nath regarding left knee pain. I increased his Gabitril to 8 mg BID. Patient advised to call the office if intolerant to higher dose. The patient returns today with pain location and distribution remain unchanged. He rates pain 5/10, a slight decrease since his last visit (6/10). He reports he does not feel weakness on his LLE as he used to. He report his neck pain is tolerable. He reports he feels weakness on his RUE and pain on his right shoulder. Additionally, he reports right foot drop. Patient did not tolerated increased Gabitril 8 mg BID without relief. Note from Dr. Tejal Nath dated 11/3/17 indicating patient was seen with c/o left knee pain. He injected his knee with relief. He noted, possible tear of the lateral meniscus. Per patient, he will go back to see him prn.  reviewed. Body mass index is 23.87 kg/(m^2).        Past Medical History:   Diagnosis Date    Arthritis     Cervical postlaminectomy syndrome     Chronic neck pain     Fall     Hypertension     OA (osteoarthritis) of knee     left knee        Social History     Social History    Marital status:      Spouse name: N/A    Number of children: N/A    Years of education: N/A     Occupational History    Retired      Social History Main Topics    Smoking status: Current Every Day Smoker     Packs/day: 0.25     Years: 13.00 Types: Cigarettes    Smokeless tobacco: Never Used      Comment: 9 cigarettes per day    Alcohol use Yes    Drug use: Not on file    Sexual activity: Not Currently     Partners: Female     Other Topics Concern    Not on file     Social History Narrative       Current Outpatient Prescriptions   Medication Sig Dispense Refill    tiaGABine (GABITRIL) 4 mg tablet Take 2 Tabs by mouth two (2) times daily (with meals). 120 Tab 1    acetaminophen (TYLENOL ARTHRITIS PAIN) 650 mg CR tablet Take 650 mg by mouth every six (6) hours as needed for Pain.  Omeprazole delayed release (PRILOSEC D/R) 20 mg tablet 20 mg.      baclofen (LIORESAL) 10 mg tablet Take 20 mg by mouth three (3) times daily.  aspirin delayed-release 81 mg tablet Take  by mouth daily.  lisinopril-hydrochlorothiazide (PRINZIDE, ZESTORETIC) 20-12.5 mg per tablet Take  by mouth daily. No Known Allergies     Ambulates with a single point cane. PHYSICAL EXAMINATION    Visit Vitals    BP 99/67    Pulse 72    Resp 16    Ht 6' (1.829 m)    Wt 176 lb (79.8 kg)    BMI 23.87 kg/m2       CONSTITUTIONAL: NAD, A&O x 3  SENSATION: Intact to light touch throughout  RANGE OF MOTION: The patient has full passive range of motion in all four extremities. MOTOR:  Straight Leg Raise: Negative, bilateral       Shoulder AB/Flex Elbow Flex Wrist Ext Elbow Ext Wrist Flex Hand Intrin Tone   Right 4/5 +4/5 +4/5 +4/5 +4/5 +4/5 +4/5   Left +4/5 +4/5 +4/5 +4/5 +4/5 +4/5 +4/5                Hip Flex Knee Ext Knee Flex Ankle DF GTE Ankle PF Tone   Right +4/5 +4/5 +4/5 +4/5 +4/5 +4/5 +4/5   Left +4/5 +4/5 +4/5 +4/5 +4/5 +4/5 +4/5       ASSESSMENT   Diagnoses and all orders for this visit:    1. Spinal stenosis of lumbar region with neurogenic claudication    2. Other intervertebral disc displacement, lumbar region    3. Postlaminectomy syndrome    4.  DDD (degenerative disc disease), lumbar          IMPRESSION AND PLAN:  Patient would like to proceed conservatively. I offered to refer him to pain management, he defer at this time. Patient will wean off Gabitril 8 mg BID. Patient will [facility] his off his Gabitril 8 mg BID. I encourage patient to perform his HEP daily. I will see the patient back prn. Written by Imtiaz Huynh, as dictated by Nereyda Panda MD  I examined the patient, reviewed and agree with the note.

## 2018-01-26 NOTE — MR AVS SNAPSHOT
[address] [facility] 
 
 
 [address] [facility] 
[phone] Patient: Kika Quinteros MRN: [id number] :1950 Visit Information Date & Time Provider Department Dept. Phone Encounter #  
 2018  8:35 AM Nohemi Pratt MD [facility], [address] [facility] [place] [phone] [id number] Follow-up Instructions Return if symptoms worsen or fail to improve. Upcoming Health Maintenance Date Due Hepatitis C Screening 1950 DTaP/Tdap/Td series (1 - Tdap) 10/18/1973 FOBT Q 1 YEAR AGE 50-75 10/18/2002 ZOSTER VACCINE AGE 60> 2012 Influenza Age 5 to Adult 2017 GLAUCOMA SCREENING Q2Y 10/18/2017 Pneumococcal 65+ Low/Medium Risk (1 of 2 - PCV13) 10/18/2017 MEDICARE YEARLY EXAM 10/18/2017 Allergies as of 2018  Review Complete On: 2018 By: Nohemi Pratt MD  
 No Known Allergies Current Immunizations  Never Reviewed No immunizations on file. Not reviewed this visit You Were Diagnosed With   
  
 Codes Comments Spinal stenosis of lumbar region with neurogenic claudication    -  Primary ICD-10-CM: H83.495 
ICD-9-CM: 724.03 Other intervertebral disc displacement, lumbar region     ICD-10-CM: M51.26 
ICD-9-CM: 722.10 Postlaminectomy syndrome     ICD-10-CM: M96.1 ICD-9-CM: 722.80 DDD (degenerative disc disease), lumbar     ICD-10-CM: M51.36 
ICD-9-CM: 722.52 Vitals BP Pulse Resp Height(growth percentile) Weight(growth percentile) BMI  
 99/67 72 16 6' (1.829 m) 176 lb (79.8 kg) 23.87 kg/m2 Smoking Status Current Every Day Smoker BMI and BSA Data Body Mass Index Body Surface Area  
 23.87 kg/m 2 2.01 m 2 Preferred Pharmacy Pharmacy Name Phone [address] [place] [address] [phone] Your Updated Medication List  
  
   
 This list is accurate as of: 1/26/18  9:03 AM.  Always use your most recent med list.  
  
  
  
  
 aspirin delayed-release 81 mg tablet Take  by mouth daily. baclofen 10 mg tablet Commonly known as:  LIORESAL Take 20 mg by mouth three (3) times daily. lisinopril-hydroCHLOROthiazide 20-12.5 mg per tablet Commonly known as:  Light Spearing Take  by mouth daily. Omeprazole delayed release 20 mg tablet Commonly known as:  PRILOSEC D/R  
20 mg.  
  
 tiaGABine 4 mg tablet Commonly known as:  GABITRIL Take 2 Tabs by mouth two (2) times daily (with meals). TYLENOL ARTHRITIS PAIN 650 mg Darus Serene Generic drug:  acetaminophen Take 650 mg by mouth every six (6) hours as needed for Pain. Follow-up Instructions Return if symptoms worsen or fail to improve. Introducing \[facility] [place] [facility]\"" & HEALTH SERVICES! Prince Griffith introduces [employer] patient portal. Now you can access parts of your medical record, email your doctor's office, and request medication refills online. 1. In your internet browser, go to [email or web address] 2. Click on the First Time User? Click Here link in the Sign In box. You will see the New Member Sign Up page. 3. Enter your [employer] Access Code exactly as it appears below. You will not need to use this code after youve completed the sign-up process. If you do not sign up before the expiration date, you must request a new code. · [employer] Access Code: [email or web address] Expires: 4/26/2018  8:12 AM 
 
4. Enter the last four digits of your Social Security Number (xxxx) and Date of Birth (mm/dd/yyyy) as indicated and click Submit. You will be taken to the next sign-up page. 5. Create a Ecovisiont ID. This will be your [employer] login ID and cannot be changed, so think of one that is secure and easy to remember. 6. Create a [employer] password. You can change your password at any time. 7. Enter your Password Reset Question and Answer. This can be used at a later time if you forget your password. 8. Enter your e-mail address. You will receive e-mail notification when new information is available in [address] 9. Click Sign Up. You can now view and download portions of your medical record. 10. Click the Download Summary menu link to download a portable copy of your medical information. If you have questions, please visit the Frequently Asked Questions section of the [employer] website. Remember, [facility] is NOT to be used for urgent needs. For medical emergencies, dial 911. Now available from your iPhone and Android! Please provide this summary of care documentation to your next provider. Your primary care clinician is listed as London Hardwick. If you have any questions after today's visit, please call [phone].

## 2018-06-18 ENCOUNTER — OFFICE VISIT (OUTPATIENT)
Dept: ORTHOPEDIC SURGERY | Facility: CLINIC | Age: 68
End: 2018-06-18

## 2018-06-18 VITALS
TEMPERATURE: 98.4 F | RESPIRATION RATE: 18 BRPM | BODY MASS INDEX: 21.89 KG/M2 | SYSTOLIC BLOOD PRESSURE: 106 MMHG | HEIGHT: 72 IN | DIASTOLIC BLOOD PRESSURE: 72 MMHG | OXYGEN SATURATION: 99 % | WEIGHT: 161.6 LBS | HEART RATE: 79 BPM

## 2018-06-18 DIAGNOSIS — M17.12 PRIMARY OSTEOARTHRITIS OF LEFT KNEE: Primary | ICD-10-CM

## 2018-06-18 DIAGNOSIS — M25.562 CHRONIC PAIN OF LEFT KNEE: ICD-10-CM

## 2018-06-18 DIAGNOSIS — M25.551 RIGHT HIP PAIN: ICD-10-CM

## 2018-06-18 DIAGNOSIS — G89.29 CHRONIC PAIN OF LEFT KNEE: ICD-10-CM

## 2018-06-18 DIAGNOSIS — M25.511 CHRONIC RIGHT SHOULDER PAIN: ICD-10-CM

## 2018-06-18 DIAGNOSIS — M54.32 SCIATICA, LEFT SIDE: ICD-10-CM

## 2018-06-18 DIAGNOSIS — M70.61 TROCHANTERIC BURSITIS OF RIGHT HIP: ICD-10-CM

## 2018-06-18 DIAGNOSIS — G89.29 CHRONIC RIGHT SHOULDER PAIN: ICD-10-CM

## 2018-06-18 DIAGNOSIS — M75.51 BURSITIS OF RIGHT SHOULDER: ICD-10-CM

## 2018-06-18 RX ORDER — GABAPENTIN 300 MG/1
300 CAPSULE ORAL 3 TIMES DAILY
COMMUNITY

## 2018-06-18 RX ORDER — BUPIVACAINE HYDROCHLORIDE 2.5 MG/ML
4 INJECTION, SOLUTION EPIDURAL; INFILTRATION; INTRACAUDAL ONCE
Qty: 4 ML | Refills: 0
Start: 2018-06-18 | End: 2018-06-18

## 2018-06-18 RX ORDER — GLUCOSAMINE SULFATE 1500 MG
POWDER IN PACKET (EA) ORAL DAILY
COMMUNITY

## 2018-06-18 RX ORDER — BETAMETHASONE SODIUM PHOSPHATE AND BETAMETHASONE ACETATE 3; 3 MG/ML; MG/ML
6 INJECTION, SUSPENSION INTRA-ARTICULAR; INTRALESIONAL; INTRAMUSCULAR; SOFT TISSUE ONCE
Qty: 0.5 ML | Refills: 0
Start: 2018-06-18 | End: 2018-06-18

## 2018-06-18 NOTE — PROGRESS NOTES
Patient: Garrett Jaramillo                MRN: [id number]       SSN: [id number]  YOB: 1950        AGE: 74 y.o. SEX: male    PCP: Cuate Benavides MD  06/18/18    CC: LEFT KNEE AND RIGHT HIP PAIN     HISTORY:  Garrett Jaramillo is a 74 y.o. male who is seen for left knee, right hip and right shoulder pain. He reports pain when reaching overhead with his right shoulder. He had a fall in 2009 and has had winging of the scapula since. He is s/p cervical fusion c3-c5 with some residual nerve damage due to the fall. He reports increased low back and right hip pain recently. He states that he has been overlooking his shoulder and hip pain because of his knee and back issues. He reports that he injured his hip in 2010 when he fell. He reports that his left knee pain is a result of a service related injuries. He states he has completed several courses of outpatient PT at the [place], most recently on 10/30/17 with little relief. He is being seen by Dr. Vu Hammond for his low back pain. He states his left knee pain is around a 5-6/10. He states that his left knee will sometimes give out. He notes a sharp pain radiating from his left hip all the way down his leg. He is s/p cervical fusion in 2010. Pain Assessment  6/18/2018   Location of Pain Hip   Pain Location Comment -   Location Modifiers Right   Severity of Pain 6   Quality of Pain Sharp; Aching   Quality of Pain Comment -   Duration of Pain Persistent   Frequency of Pain Constant   Aggravating Factors Walking;Standing;Bending   Aggravating Factors Comment -   Limiting Behavior Yes   Relieving Factors Other (Comment)   Relieving Factors Comment salon pas   Result of Injury No     Occupation, etc:  Mr. Juanita Puckett retired from [employer] in 2005 after 24 years in the [employer]. He works at [employer]. 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 He lives in [place] with his wife. He has 1 adult son living in [address] [place] [address] and 2 adult step children. Current weight is 173 pounds. He is 6' tall. He is not hypertensive or diabetic.      No results found for: HBA1C, HGBE8, GEO0IUIW, GHR9HJIL, IVT5XKJB  Weight Metrics 6/18/2018 1/26/2018 12/15/2017 12/8/2017 12/1/2017 11/3/2017 10/20/2017   Weight 161 lb 9.6 oz 176 lb 169 lb 171 lb 171 lb 9.6 oz 173 lb 171 lb 9.6 oz   BMI 21.92 kg/m2 23.87 kg/m2 22.92 kg/m2 23.19 kg/m2 23.27 kg/m2 23.46 kg/m2 23.27 kg/m2       Patient Active Problem List   Diagnosis Code    Primary localized osteoarthrosis, lower leg M17.10    Osteoarthritis, knee M17.10    Neck pain, acute M54.2    Postlaminectomy syndrome, cervical M96.1    Lumbago M54.5    Lumbosacral neuritis M54.17    Cervicalgia M54.2    Postlaminectomy syndrome, not elsewhere classified M96.1    Low back pain M54.5    Radiculopathy, lumbosacral region M54.17    Chronic right shoulder pain M25.511, G89.29    DDD (degenerative disc disease), cervical M50.30    Degenerative tear of glenoid labrum of right shoulder M24.111    Low back pain without sciatica M54.5    Spondylosis of lumbar region without myelopathy or radiculopathy M47.816    Lumbar neuritis M54.16    DDD (degenerative disc disease), lumbar M51.36    Lumbar discitis M46.46    Other cervical disc degeneration, unspecified cervical region M50.30    Postlaminectomy syndrome M96.1    Other articular cartilage disorders, right shoulder M24.111    Spondylolisthesis, acquired M43.10    Bulging lumbar disc M51.26    Lumbar spinal stenosis M48.061    Tear of meniscus of left knee S83.207A    Other intervertebral disc degeneration, lumbar region M51.36    Other intervertebral disc displacement, lumbar region M51.26    Cervical postlaminectomy syndrome M96.1    Cervical neuritis M54.12    Lumbar herniated disc M51.26     REVIEW OF SYSTEMS: All Below are Negative except: See HPI   Constitutional: negative for fever, chills, and weight loss. Cardiovascular: negative for chest pain, claudication, leg swelling, SOB, GATES   Gastrointestinal: Negative for pain, N/V/C/D, Blood in stool or urine, dysuria,  hematuria, incontinence, pelvic pain. Musculoskeletal: See HPI   Neurological: Negative for dizziness and weakness. Negative for headaches, Visual changes, confusion, seizures   Phychiatric/Behavioral: Negative for depression, memory loss, substance  abuse. Extremities: Negative for hair changes, rash, or skin lesion changes. Hematologic: Negative for bleeding problems, bruising, pallor or swollen lymph  nodes   Peripheral Vascular: No calf pain, no circulation deficits. Social History     Social History    Marital status:      Spouse name: N/A    Number of children: N/A    Years of education: N/A     Occupational History    Retired      Social History Main Topics    Smoking status: Current Every Day Smoker     Packs/day: 0.25     Years: 13.00     Types: Cigarettes    Smokeless tobacco: Never Used      Comment: 9 cigarettes per day    Alcohol use Yes    Drug use: Not on file    Sexual activity: Not Currently     Partners: Female     Other Topics Concern    Not on file     Social History Narrative      No Known Allergies   Current Outpatient Prescriptions   Medication Sig    gabapentin (NEURONTIN) 300 mg capsule Take 300 mg by mouth three (3) times daily.  cholecalciferol (VITAMIN D3) 1,000 unit cap Take  by mouth daily.  Omeprazole delayed release (PRILOSEC D/R) 20 mg tablet 20 mg.    baclofen (LIORESAL) 10 mg tablet Take 20 mg by mouth three (3) times daily.  aspirin delayed-release 81 mg tablet Take  by mouth daily.  lisinopril-hydrochlorothiazide (PRINZIDE, ZESTORETIC) 20-12.5 mg per tablet Take  by mouth daily.  tiaGABine (GABITRIL) 4 mg tablet Take 2 Tabs by mouth two (2) times daily (with meals).     acetaminophen (TYLENOL ARTHRITIS PAIN) 650 mg CR tablet Take 650 mg by mouth every six (6) hours as needed for Pain. No current facility-administered medications for this visit. PHYSICAL EXAMINATION:  Visit Vitals    /72    Pulse 79    Temp 98.4 °F (36.9 °C) (Oral)    Resp 18    Ht 6' (1.829 m)    Wt 161 lb 9.6 oz (73.3 kg)    SpO2 99%    BMI 21.92 kg/m2      ORTHO EXAMINATION:  Examination Right hip Left hip   Skin Intact Intact   External Rotation ROM 20 20   Internal Rotation ROM 10 10   Trochanteric tenderness +, posterior -   Hip flexion contracture - -   Antalgic gait - -   Trendelenberg sign - -   Lumbar tenderness - -   Straight leg raise - -   Calf tenderness - -   Neurovascular Intact Intact     Examination Right knee Left knee   Skin Intact Intact   Range of motion 120-0 100-5   Effusion - -   Medial joint line tenderness + +   Lateral joint line tenderness - -   Popliteal tenderness - -   Osteophytes palpable - +, medial and lateral   Nazarios - -   Patella crepitus - -   Anterior drawer - -   Lateral laxity - -   Medial laxity - -   Varus deformity - -   Valgus deformity - -   Pretibial edema - -   Calf tenderness - -   Wearing a soft knee brace  Ambulates using a tri-pod cane in his left hand    Examination Right shoulder Left shoulder   Skin Intact Intact   Effusion - -   Biceps deformity - -   Atrophy +deltoid -   AC joint tenderness - -   Acromial tenderness + +   Biceps tenderness - -   Forward flexion/Elevation  170   Active abduction  160   External rotation ROM 10 30   Internal rotation ROM 60 90   Apprehension - -   Impingement - -   Drop Arm Test - -   Neurovascular Intact Intact   Winging of the right scapula  Limited ROM of the right arm  asymmetry and a sunken in area of the right shoulder    PROCEDURE:  After discussing treatment options, patient's right hip, right shoulder, and left knee were injected with 4 cc Marcaine and 1/2 cc Celestone.     Chart reviewed for the following:   Isa Corbin MD, have reviewed the History, Physical and updated the Allergic reactions for [address] performed immediately prior to start of procedure:  Isa Corbin MD, have performed the following reviews on [facility] prior to the start of the procedure:            * Patient was identified by name and date of birth   * Agreement on procedure being performed was verified  * Risks and Benefits explained to the patient  * Procedure site verified and marked as necessary  * Patient was positioned for comfort  * Consent was obtained     Time: 5:06 PM    Date of procedure: 6/18/2018    Procedure performed by:  Ayaan Mccoy MD    Mr. Herrera Herbert tolerated the procedure well with no complications. MRI LEFT KNEE WO CONT 5/11/17  IMPRESSION:   1. Edema and fluid within ACL tendon fibers, likely ganglion cyst versus low-grade partial tear. Other major ligaments appear intact.   -Lateral meniscus possible posterior horn tear. No definite medial meniscus tear. 2. Patchy high-grade cartilage loss in the patellofemoral and medial compartments, grade 3-4. 3. Edema in the suprapatellar fat pad may be seen with quadriceps fat pad impingement. Trace joint effusion. Minimal Hoffa's fat pad edema.   -Small amount of fluid surrounding the semimembranosus tendon, possibly bursitis or tracking popliteal cyst.     RADIOGRAPHS:  XR RIGHT HIP 12/1/17  IMPRESSION:  AP pelvis and two views - No fractures, no joint space narrowing, no osteophytes present. XR LEFT KNEE 11/3/17  IMPRESSION:  Three views - No fractures, no effusion, L>R medial joint space narrowing, + medial and lateral osteophytes present. Moderately severe L, moderate R    IMPRESSION:      ICD-10-CM ICD-9-CM    1.  Primary osteoarthritis of left knee M17.12 715.16 PROCEDURE AUTHORIZATION TO       betamethasone (CELESTONE SOLUSPAN) 6 mg/mL injection      BETAMETHASONE ACETATE & SODIUM PHOSPHATE INJECTION 3 MG EA.      DRAIN/INJECT LARGE JOINT/BURSA      bupivacaine, PF, (MARCAINE, PF,) 0.25 % (2.5 mg/mL) injection   2. Chronic pain of left knee M25.562 719.46 PROCEDURE AUTHORIZATION TO     G89.29 338.29 betamethasone (CELESTONE SOLUSPAN) 6 mg/mL injection      BETAMETHASONE ACETATE & SODIUM PHOSPHATE INJECTION 3 MG EA.      DRAIN/INJECT LARGE JOINT/BURSA      bupivacaine, PF, (MARCAINE, PF,) 0.25 % (2.5 mg/mL) injection   3. Trochanteric bursitis of right hip M70.61 726.5 betamethasone (CELESTONE SOLUSPAN) 6 mg/mL injection      BETAMETHASONE ACETATE & SODIUM PHOSPHATE INJECTION 3 MG EA.      DRAIN/INJECT LARGE JOINT/BURSA      bupivacaine, PF, (MARCAINE, PF,) 0.25 % (2.5 mg/mL) injection   4. Right hip pain M25.551 719.45 betamethasone (CELESTONE SOLUSPAN) 6 mg/mL injection      BETAMETHASONE ACETATE & SODIUM PHOSPHATE INJECTION 3 MG EA.      DRAIN/INJECT LARGE JOINT/BURSA      bupivacaine, PF, (MARCAINE, PF,) 0.25 % (2.5 mg/mL) injection   5. Chronic right shoulder pain M25.511 719.41 betamethasone (CELESTONE SOLUSPAN) 6 mg/mL injection    G89.29 338.29 BETAMETHASONE ACETATE & SODIUM PHOSPHATE INJECTION 3 MG EA.      DRAIN/INJECT LARGE JOINT/BURSA      bupivacaine, PF, (MARCAINE, PF,) 0.25 % (2.5 mg/mL) injection   6. Bursitis of right shoulder M75.51 726.10 betamethasone (CELESTONE SOLUSPAN) 6 mg/mL injection      BETAMETHASONE ACETATE & SODIUM PHOSPHATE INJECTION 3 MG EA.      DRAIN/INJECT LARGE JOINT/BURSA      bupivacaine, PF, (MARCAINE, PF,) 0.25 % (2.5 mg/mL) injection   7. Sciatica, left side M54.32 724.3      PLAN: After discussing treatment options, patient's right shoulder, left knee and right hip areas were injected with 4 cc Marcaine and 1/2 cc Celestone. Consider repeat visco supplementation if pain continues since he responded nicely to Euflexxa in the past.  He will follow up as needed.     Scribed by [facility]) as dictated by Rey Santiago MD

## 2018-06-18 NOTE — PATIENT INSTRUCTIONS
Knee Arthritis: Exercises  Your Care Instructions  Here are some examples of exercises for knee arthritis. Start each exercise slowly. Ease off the exercise if you start to have pain. Your doctor or physical therapist will tell you when you can start these exercises and which ones will work best for you. How to do the exercises  Knee flexion with heel slide    1. Lie on your back with your knees bent. 2. Slide your heel back by bending your affected knee as far as you can. Then hook your other foot around your ankle to help pull your heel even farther back. 3. Hold for about 6 seconds, then rest for up to 10 seconds. 4. Repeat 8 to 12 times. 5. Switch legs and repeat steps 1 through 4, even if only one knee is sore. Quad sets    1. Sit with your affected leg straight and supported on the floor or a firm bed. Place a small, rolled-up towel under your knee. Your other leg should be bent, with that foot flat on the floor. 2. Tighten the thigh muscles of your affected leg by pressing the back of your knee down into the towel. 3. Hold for about 6 seconds, then rest for up to 10 seconds. 4. Repeat 8 to 12 times. 5. Switch legs and repeat steps 1 through 4, even if only one knee is sore. Straight-leg raises to the front    1. Lie on your back with your good knee bent so that your foot rests flat on the floor. Your affected leg should be straight. Make sure that your low back has a normal curve. You should be able to slip your hand in between the floor and the small of your back, with your palm touching the floor and your back touching the back of your hand. 2. Tighten the thigh muscles in your affected leg by pressing the back of your knee flat down to the floor. Hold your knee straight. 3. Keeping the thigh muscles tight and your leg straight, lift your affected leg up so that your heel is about 12 inches off the floor. Hold for about 6 seconds, then lower slowly.   4. Relax for up to 10 seconds between repetitions. 5. Repeat 8 to 12 times. 6. Switch legs and repeat steps 1 through 5, even if only one knee is sore. Active knee flexion    1. Lie on your stomach with your knees straight. If your kneecap is uncomfortable, roll up a washcloth and put it under your leg just above your kneecap. 2. Lift the foot of your affected leg by bending the knee so that you bring the foot up toward your buttock. If this motion hurts, try it without bending your knee quite as far. This may help you avoid any painful motion. 3. Slowly move your leg up and down. 4. Repeat 8 to 12 times. 5. Switch legs and repeat steps 1 through 4, even if only one knee is sore. Quadriceps stretch (facedown)    1. Lie flat on your stomach, and rest your face on the floor. 2. Wrap a towel or belt strap around the lower part of your affected leg. Then use the towel or belt strap to slowly pull your heel toward your buttock until you feel a stretch. 3. Hold for about 15 to 30 seconds, then relax your leg against the towel or belt strap. 4. Repeat 2 to 4 times. 5. Switch legs and repeat steps 1 through 4, even if only one knee is sore. Stationary exercise bike    1. If you do not have a stationary exercise bike at home, you can find one to ride at your local health club or community center. 2. Adjust the height of the bike seat so that your knee is slightly bent when your leg is extended downward. If your knee hurts when the pedal reaches the top, you can raise the seat so that your knee does not bend as much. 3. Start slowly. At first, try to do 5 to 10 minutes of cycling with little to no resistance. Then increase your time and the resistance bit by bit until you can do 20 to 30 minutes without pain. 4. If you start to have pain, rest your knee until your pain gets back to the level that is normal for you. Or cycle for less time or with less effort. Follow-up care is a key part of your treatment and safety.  Be sure to make and go to all appointments, and call your doctor if you are having problems. It's also a good idea to know your test results and keep a list of the medicines you take. Where can you learn more? Go to http://raúl-leisa.info/. Enter C159 in the search box to learn more about \"Knee Arthritis: Exercises. \"  Current as of: March 21, 2017  Content Version: 11.4  © 2006-2017 [employer]. Care instructions adapted under license by [employer] (which disclaims liability or warranty for this information). If you have questions about a medical condition or this instruction, always ask your healthcare professional. [address] any warranty or liability for your use of this information. Sciatica: Exercises  Your Care Instructions  Here are some examples of typical rehabilitation exercises for your condition. Start each exercise slowly. Ease off the exercise if you start to have pain. Your doctor or physical therapist will tell you when you can start these exercises and which ones will work best for you. When you are not being active, find a comfortable position for rest. Some people are comfortable on the floor or a medium-firm bed with a small pillow under their head and another under their knees. Some people prefer to lie on their side with a pillow between their knees. Don't stay in one position for too long. Take short walks (10 to 20 minutes) every 2 to 3 hours. Avoid slopes, hills, and stairs until you feel better. Walk only distances you can manage without pain, especially leg pain. How to do the exercises  Back stretches    6. Get down on your hands and knees on the floor. 7. Relax your head and allow it to droop. Round your back up toward the ceiling until you feel a nice stretch in your upper, middle, and lower back. Hold this stretch for as long as it feels comfortable, or about 15 to 30 seconds.   8. Return to the starting position with a flat back while you are on your hands and knees. 9. Let your back sway by pressing your stomach toward the floor. Lift your buttocks toward the ceiling. 10. Hold this position for 15 to 30 seconds. 11. Repeat 2 to 4 times. Follow-up care is a key part of your treatment and safety. Be sure to make and go to all appointments, and call your doctor if you are having problems. It's also a good idea to know your test results and keep a list of the medicines you take. Where can you learn more? Go to http://raúl-leisa.info/. Enter M505 in the search box to learn more about \"Sciatica: Exercises. \"  Current as of: March 21, 2017  Content Version: 11.4  © 3940-4038 [employer]. Care instructions adapted under license by [employer] (which disclaims liability or warranty for this information). If you have questions about a medical condition or this instruction, always ask your healthcare professional. [place] [address] any warranty or liability for your use of this information. Shoulder Bursitis: Exercises  Your Care Instructions  Here are some examples of typical rehabilitation exercises for your condition. Start each exercise slowly. Ease off the exercise if you start to have pain. Your doctor or physical therapist will tell you when you can start these exercises and which ones will work best for you. How to do the exercises  Posterior stretching exercise    12. Hold the elbow of your injured arm with your other hand. 13. Use your hand to pull your injured arm gently up and across your body. You will feel a gentle stretch across the back of your injured shoulder. 14. Hold for at least 15 to 30 seconds. Then slowly lower your arm. 15. Repeat 2 to 4 times. Up-the-back stretch    Your doctor or physical therapist may want you to wait to do this stretch until you have regained most of your range of motion and strength. You can do this stretch in different ways. Hold any of these stretches for at least 15 to 30 seconds. Repeat them 2 to 4 times. 6. Put your hand in your back pocket. Let it rest there to stretch your shoulder. 7. With your other hand, hold your injured arm (palm outward) behind your back by the wrist. Pull your arm up gently to stretch your shoulder. 8. Next, put a towel over your other shoulder. Put the hand of your injured arm behind your back. Now hold the back end of the towel. With the other hand, hold the front end of the towel in front of your body. Pull gently on the front end of the towel. This will bring your hand farther up your back to stretch your shoulder. Overhead stretch    7. Standing about an arm's length away, grasp onto a solid surface. You could use a countertop, a doorknob, or the back of a sturdy chair. 8. With your knees slightly bent, bend forward with your arms straight. Lower your upper body, and let your shoulders stretch. 9. As your shoulders are able to stretch farther, you may need to take a step or two backward. 10. Hold for at least 15 to 30 seconds. Then stand up and relax. If you had stepped back during your stretch, step forward so you can keep your hands on the solid surface. 11. Repeat 2 to 4 times. Shoulder flexion (lying down)    To make a wand for this exercise, use a piece of PVC pipe or a broom handle with the broom removed. Make the wand about a foot wider than your shoulders. 6. Lie on your back, holding a wand with both hands. Your palms should face down as you hold the wand. 7. Keeping your elbows straight, slowly raise your arms over your head. Raise them until you feel a stretch in your shoulders, upper back, and chest.  8. Hold for 15 to 30 seconds. 9. Repeat 2 to 4 times. Shoulder rotation (lying down)    To make a wand for this exercise, use a piece of PVC pipe or a broom handle with the broom removed. Make the wand about a foot wider than your shoulders. 6. Lie on your back.  Hold a wand with both hands with your elbows bent and palms up. 7. Keep your elbows close to your body, and move the wand across your body toward the sore arm. 8. Hold for 8 to 12 seconds. 9. Repeat 2 to 4 times. Shoulder blade squeeze    5. Stand with your arms at your sides, and squeeze your shoulder blades together. Do not raise your shoulders up as you squeeze. 6. Hold 6 seconds. 7. Repeat 8 to 12 times. Shoulder flexor and extensor exercise    These are isometric exercises. That means you contract your muscles without actually moving. 1. Push forward (flex): Stand facing a wall or doorjamb, about 6 inches or less back. Hold your injured arm against your body. Make a closed fist with your thumb on top. Then gently push your hand forward into the wall with about 25% to 50% of your strength. Don't let your body move backward as you push. Hold for about 6 seconds. Relax for a few seconds. Repeat 8 to 12 times. 2. Push backward (extend): Stand with your back flat against a wall. Your upper arm should be against the wall, with your elbow bent 90 degrees (your hand straight ahead). Push your elbow gently back against the wall with about 25% to 50% of your strength. Don't let your body move forward as you push. Hold for about 6 seconds. Relax for a few seconds. Repeat 8 to 12 times. Scapular exercise: Wall push-ups    This exercise is best done with your fingers somewhat turned out, rather than straight up and down. 1. Stand facing a wall, about 12 inches to 18 inches away. 2. Place your hands on the wall at shoulder height. 3. Slowly bend your elbows and bring your face to the wall. Keep your back and hips straight. 4. Push back to where you started. 5. Repeat 8 to 12 times. 6. When you can do this exercise against a wall comfortably, you can try it against a counter. You can then slowly progress to the end of a couch, then to a sturdy chair, and finally to the floor.   Scapular exercise: Retraction    For this exercise, you will need elastic exercise material, such as surgical tubing or Thera-Band. 1. Put the band around a solid object at about waist level. (A bedpost will work well.) Each hand should hold an end of the band. 2. With your elbows at your sides and bent to 90 degrees, pull the band back. Your shoulder blades should move toward each other. Then move your arms back where you started. 3. Repeat 8 to 12 times. 4. If you have good range of motion in your shoulders, try this exercise with your arms lifted out to the sides. Keep your elbows at a 90-degree angle. Raise the elastic band up to about shoulder level. Pull the band back to move your shoulder blades toward each other. Then move your arms back where you started. Internal rotator strengthening exercise    1. Start by tying a piece of elastic exercise material to a doorknob. You can use surgical tubing or Thera-Band. 2. Stand or sit with your shoulder relaxed and your elbow bent 90 degrees. Your upper arm should rest comfortably against your side. Squeeze a rolled towel between your elbow and your body for comfort. This will help keep your arm at your side. 3. Hold one end of the elastic band in the hand of the painful arm. 4. Slowly rotate your forearm toward your body until it touches your belly. Slowly move it back to where you started. 5. Keep your elbow and upper arm firmly tucked against the towel roll or at your side. 6. Repeat 8 to 12 times. External rotator strengthening exercise    1. Start by tying a piece of elastic exercise material to a doorknob. You can use surgical tubing or Thera-Band. (You may also hold one end of the band in each hand.)  2. Stand or sit with your shoulder relaxed and your elbow bent 90 degrees. Your upper arm should rest comfortably against your side. Squeeze a rolled towel between your elbow and your body for comfort. This will help keep your arm at your side.   3. Hold one end of the elastic band with the hand of the painful arm. 4. Start with your forearm across your belly. Slowly rotate the forearm out away from your body. Keep your elbow and upper arm tucked against the towel roll or the side of your body until you begin to feel tightness in your shoulder. Slowly move your arm back to where you started. 5. Repeat 8 to 12 times. Follow-up care is a key part of your treatment and safety. Be sure to make and go to all appointments, and call your doctor if you are having problems. It's also a good idea to know your test results and keep a list of the medicines you take. Where can you learn more? Go to http://raúl-leisa.info/. Enter T662 in the search box to learn more about \"Shoulder Bursitis: Exercises. \"  Current as of: March 21, 2017  Content Version: 11.4  © 2419-0806 [employer]. Care instructions adapted under license by [employer] (which disclaims liability or warranty for this information). If you have questions about a medical condition or this instruction, always ask your healthcare professional. [address] any warranty or liability for your use of this information. Shoulder Stretches: Exercises  Your Care Instructions  Here are some examples of exercises for your shoulder. Start each exercise slowly. Ease off the exercise if you start to have pain. Your doctor or physical therapist will tell you when you can start these exercises and which ones will work best for you. How to do the exercises  Shoulder stretch    16.  a doorway and place one arm against the door frame. Your elbow should be a little higher than your shoulder. 17. Relax your shoulders as you lean forward, allowing your chest and shoulder muscles to stretch. You can also turn your body slightly away from your arm to stretch the muscles even more. 18. Hold for 15 to 30 seconds. 19. Repeat 2 to 4 times with each arm. Shoulder and chest stretch    9.  Shoulder and chest stretch  10. While sitting, relax your upper body so you slump slightly in your chair. 11. As you breathe in, straighten your back and open your arms out to the sides. 12. Gently pull your shoulder blades back and downward. 13. Hold for 15 to 30 seconds as your breathe normally. 14. Repeat 2 to 4 times. Overhead stretch    12. Reach up over your head with both arms. 13. Hold for 15 to 30 seconds. 14. Repeat 2 to 4 times. Follow-up care is a key part of your treatment and safety. Be sure to make and go to all appointments, and call your doctor if you are having problems. It's also a good idea to know your test results and keep a list of the medicines you take. Where can you learn more? Go to http://raúl-leisa.info/. Enter S254 in the search box to learn more about \"Shoulder Stretches: Exercises. \"  Current as of: March 21, 2017  Content Version: 11.4  © 2189-3879 [employer]. Care instructions adapted under license by [employer] (which disclaims liability or warranty for this information). If you have questions about a medical condition or this instruction, always ask your healthcare professional. [address] any warranty or liability for your use of this information.

## 2018-06-18 NOTE — PROGRESS NOTES
Chief Complaint   Patient presents with    Knee Pain     Left    Hip Pain     Right     6/10 pain.

## 2018-07-13 ENCOUNTER — OFFICE VISIT (OUTPATIENT)
Dept: ORTHOPEDIC SURGERY | Facility: CLINIC | Age: 68
End: 2018-07-13

## 2018-07-13 VITALS
HEART RATE: 74 BPM | BODY MASS INDEX: 21.94 KG/M2 | DIASTOLIC BLOOD PRESSURE: 69 MMHG | SYSTOLIC BLOOD PRESSURE: 109 MMHG | TEMPERATURE: 97.6 F | WEIGHT: 162 LBS | OXYGEN SATURATION: 100 % | HEIGHT: 72 IN

## 2018-07-13 DIAGNOSIS — G89.29 CHRONIC PAIN OF LEFT KNEE: ICD-10-CM

## 2018-07-13 DIAGNOSIS — M95.8 WINGED SCAPULA OF RIGHT SIDE: ICD-10-CM

## 2018-07-13 DIAGNOSIS — M17.12 PRIMARY OSTEOARTHRITIS OF LEFT KNEE: Primary | ICD-10-CM

## 2018-07-13 DIAGNOSIS — M25.511 CHRONIC RIGHT SHOULDER PAIN: ICD-10-CM

## 2018-07-13 DIAGNOSIS — G89.29 CHRONIC RIGHT SHOULDER PAIN: ICD-10-CM

## 2018-07-13 DIAGNOSIS — M75.51 BURSITIS OF RIGHT SHOULDER: ICD-10-CM

## 2018-07-13 DIAGNOSIS — M25.562 CHRONIC PAIN OF LEFT KNEE: ICD-10-CM

## 2018-07-13 RX ORDER — BETAMETHASONE SODIUM PHOSPHATE AND BETAMETHASONE ACETATE 3; 3 MG/ML; MG/ML
6 INJECTION, SUSPENSION INTRA-ARTICULAR; INTRALESIONAL; INTRAMUSCULAR; SOFT TISSUE ONCE
Qty: 0.5 ML | Refills: 0
Start: 2018-07-13 | End: 2018-07-13

## 2018-07-13 RX ORDER — BUPIVACAINE HYDROCHLORIDE 2.5 MG/ML
4 INJECTION, SOLUTION EPIDURAL; INFILTRATION; INTRACAUDAL ONCE
Qty: 4 ML | Refills: 0
Start: 2018-07-13 | End: 2018-07-13

## 2018-07-13 RX ORDER — HYALURONATE SODIUM 10 MG/ML
2 SYRINGE (ML) INTRAARTICULAR ONCE
Qty: 2 ML | Refills: 0
Start: 2018-07-13 | End: 2018-07-13

## 2018-07-13 NOTE — PROGRESS NOTES
Patient: Yousif Acosta                MRN: [id number]       SSN: [id number] YOB: 1950        AGE: 74 y.o. SEX: male PCP: Bong Veronica MD 
07/13/18 CC: LEFT KNEE AND RIGHT HIP PAIN  
 
HISTORY:  Yousif Acosta is a 74 y.o. male who is seen for left knee and right shoulder pain. He reports pain when reaching overhead with his right shoulder. He reports increased stiffness in his right shoulder. He had a fall in 2009 and has had winging of the scapula since. He is s/p cervical fusion c3-c5 with some residual nerve damage due to the fall. He states that he has been overlooking his shoulder and hip pain because of his knee and back issues. He reports that he injured his hip in 2010 when he fell. He reports that his left knee pain is a result of a service related injuries. He states he has completed several courses of outpatient PT at the [address] [place] [address], most recently on 10/30/17 with little relief. He states his left knee pain is around a 5-6/10. He states that his left knee will sometimes give out. He is s/p cervical fusion in 2010. He was previously seen for right hip pain. He reports continued right hip pain. He reports increased low back and right hip pain recently. He is being seen by Dr. Gabbie Ahmadi for his low back pain. He notes a sharp pain radiating from his left hip all the way down his leg. Pain Assessment  7/13/2018 Location of Pain Knee Pain Location Comment - Location Modifiers Left Severity of Pain 7 Quality of Pain Aching; [place] Quality of Pain Comment - Duration of Pain - Frequency of Pain Constant Aggravating Factors Stairs; Walking;Standing Aggravating Factors Comment - Limiting Behavior Yes Relieving Factors Nothing Relieving Factors Comment - Result of Injury No  
 
Occupation, etc:  Mr. Kenny Hallman retired from [employer] in 2005 after 24 years in the [employer]. He works at [employer].  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 He lives in [place] with his wife. He has 1 adult son living in [address] [place] [address] and 2 adult step children. He states he lost 30 pounds this year. Current weight is 163 pounds. He is 6' tall. He is not hypertensive or diabetic. No results found for: HBA1C, HGBE8, RUC6DCON, IZU0CTIL, NWC0CRMK Weight Metrics 7/13/2018 6/18/2018 1/26/2018 12/15/2017 12/8/2017 12/1/2017 11/3/2017 Weight 162 lb 161 lb 9.6 oz 176 lb 169 lb 171 lb 171 lb 9.6 oz 173 lb BMI 21.97 kg/m2 21.92 kg/m2 23.87 kg/m2 22.92 kg/m2 23.19 kg/m2 23.27 kg/m2 23.46 kg/m2 Patient Active Problem List  
Diagnosis Code  Primary localized osteoarthrosis, lower leg M17.10  
 Osteoarthritis, knee M17.10  Neck pain, acute M54.2  Postlaminectomy syndrome, cervical M96.1  Lumbago M54.5  Lumbosacral neuritis M54.17  
 Cervicalgia M54.2  Postlaminectomy syndrome, not elsewhere classified M96.1  Low back pain M54.5  Radiculopathy, lumbosacral region M54.17  Chronic right shoulder pain M25.511, G89.29  
 DDD (degenerative disc disease), cervical M50.30  Degenerative tear of glenoid labrum of right shoulder M24.111  Low back pain without sciatica M54.5  Spondylosis of lumbar region without myelopathy or radiculopathy M47.816  Lumbar neuritis M54.16  
 DDD (degenerative disc disease), lumbar M51.36  
 Lumbar discitis M46.46  
 Other cervical disc degeneration, unspecified cervical region M50.30  Postlaminectomy syndrome M96.1  Other articular cartilage disorders, right shoulder M24.111  Spondylolisthesis, acquired M43.10  Bulging lumbar disc M51.26  
 Lumbar spinal stenosis M48.061  Tear of meniscus of left knee S83.207A  Other intervertebral disc degeneration, lumbar region M51.36  
 Other intervertebral disc displacement, lumbar region M51.26  
 Cervical postlaminectomy syndrome M96.1  Cervical neuritis M54.12  
 Lumbar herniated disc M51.26  
 
REVIEW OF SYSTEMS: All Below are Negative except: See HPI Constitutional: negative for fever, chills, and weight loss. Cardiovascular: negative for chest pain, claudication, leg swelling, SOB, GATES Gastrointestinal: Negative for pain, N/V/C/D, Blood in stool or urine, dysuria,  hematuria, incontinence, pelvic pain. Musculoskeletal: See HPI Neurological: Negative for dizziness and weakness. Negative for headaches, Visual changes, confusion, seizures Phychiatric/Behavioral: Negative for depression, memory loss, substance  abuse. Extremities: Negative for hair changes, rash, or skin lesion changes. Hematologic: Negative for bleeding problems, bruising, pallor or swollen lymph  nodes Peripheral Vascular: No calf pain, no circulation deficits. Social History Social History  Marital status:  Spouse name: N/A  
 Number of children: N/A  
 Years of education: N/A Occupational History  Retired Social History Main Topics  Smoking status: Current Every Day Smoker Packs/day: 0.25 Years: 13.00 Types: Cigarettes  Smokeless tobacco: Never Used Comment: 9 cigarettes per day  Alcohol use Yes  Drug use: Not on file  Sexual activity: Not Currently Partners: Female Other Topics Concern  Not on file Social History Narrative No Known Allergies Current Outpatient Prescriptions Medication Sig  
 gabapentin (NEURONTIN) 300 mg capsule Take 300 mg by mouth three (3) times daily.  cholecalciferol (VITAMIN D3) 1,000 unit cap Take  by mouth daily.  tiaGABine (GABITRIL) 4 mg tablet Take 2 Tabs by mouth two (2) times daily (with meals).  acetaminophen (TYLENOL ARTHRITIS PAIN) 650 mg CR tablet Take 650 mg by mouth every six (6) hours as needed for Pain.   
 Omeprazole delayed release (PRILOSEC D/R) 20 mg tablet 20 mg.  
 baclofen (LIORESAL) 10 mg tablet Take 20 mg by mouth three (3) times daily.  aspirin delayed-release 81 mg tablet Take  by mouth daily.  lisinopril-hydrochlorothiazide (PRINZIDE, ZESTORETIC) 20-12.5 mg per tablet Take  by mouth daily. No current facility-administered medications for this visit. PHYSICAL EXAMINATION: 
Visit Vitals  /69 (BP 1 Location: Left arm, BP Patient Position: Sitting)  Pulse 74  Temp 97.6 °F (36.4 °C) (Oral)  Ht 6' (1.829 m)  Wt 162 lb (73.5 kg)  SpO2 100%  BMI 21.97 kg/m2 ORTHO EXAMINATION: 
Examination Right hip Left hip Skin Intact Intact External Rotation ROM 20 20 Internal Rotation ROM 10 10 Trochanteric tenderness +, posterior - Hip flexion contracture - - Antalgic gait - - Trendelenberg sign - - Lumbar tenderness - - Straight leg raise - - Calf tenderness - - Neurovascular Intact Intact Examination Right knee Left knee Skin Intact Intact Range of motion 115-0 100-5 Effusion - - Medial joint line tenderness - + Lateral joint line tenderness - - Popliteal tenderness - -  
Osteophytes palpable - +, medial and lateral  
Nazarios - - Patella crepitus - - Anterior drawer - - Lateral laxity - - Medial laxity - - Varus deformity - -  
Valgus deformity - - Pretibial edema - - Calf tenderness - - Wearing a soft knee brace Ambulates using a tri-pod cane in his left hand - sometimes Examination Right shoulder Left shoulder Skin Intact Intact Effusion - - Biceps deformity - - Atrophy +deltoid -  
AC joint tenderness - - Acromial tenderness + + Biceps tenderness - - Forward flexion/Elevation  170 Active abduction  160 External rotation ROM 10 30 Internal rotation ROM 60 90 Apprehension - - Impingement - - Drop Arm Test - - Neurovascular Intact Intact Winging of the right scapula Limited ROM of the right arm 
asymmetry and musculat atrophy right shoulder PROCEDURE:  After discussing treatment options, patient's left knee was injected with 2 cc of Euflexxa and patient's right shoulder was injected with 4 cc Marcaine and 1/2 cc Celestone. Chart reviewed for the following: 
 Edenilson Martinez MD, have reviewed the History, Physical and updated the Allergic reactions for Maxwell Alegre TIME OUT performed immediately prior to start of procedure: 
Edenilson Martinez MD, have performed the following reviews on [place] [address] prior to the start of the procedure: 
         
* Patient was identified by name and date of birth * Agreement on procedure being performed was verified * Risks and Benefits explained to the patient * Procedure site verified and marked as necessary * Patient was positioned for comfort * Consent was obtained Time: 9:05 AM 
 
Date of procedure: 7/13/2018 Procedure performed by:  Luis Smith MD 
 
Mr. Lisa Lindsey tolerated the procedure well with no complications. MRI LEFT KNEE WO CONT 5/11/17 IMPRESSION:  
1. Edema and fluid within ACL tendon fibers, likely ganglion cyst versus low-grade partial tear. Other major ligaments appear intact.  
-Lateral meniscus possible posterior horn tear. No definite medial meniscus tear. 2. Patchy high-grade cartilage loss in the patellofemoral and medial compartments, grade 3-4. 3. Edema in the suprapatellar fat pad may be seen with quadriceps fat pad impingement. Trace joint effusion. Minimal Hoffa's fat pad edema.  
-Small amount of fluid surrounding the semimembranosus tendon, possibly bursitis or tracking popliteal cyst.  
 
RADIOGRAPHS: 
XR RIGHT HIP 12/1/17 IMPRESSION:  AP pelvis and two views - No fractures, no joint space narrowing, no osteophytes present. XR LEFT KNEE 11/3/17 IMPRESSION:  Three views - No fractures, no effusion, L>R medial joint space narrowing, + medial and lateral osteophytes present. Moderately severe L, moderate R IMPRESSION:   
  ICD-10-CM ICD-9-CM 1.  Primary osteoarthritis of left knee M17.12 715.16 ME DRAIN/INJECT LARGE JOINT/BURSA EUFLEXXA INJECTION PER DOSE  
   sodium hyaluronate (SUPARTZ FX/HYALGAN/GENIVSC) 10 mg/mL syrg injection 2. Chronic pain of left knee M25.562 719.46 ME DRAIN/INJECT LARGE JOINT/BURSA  
 G89.29 338.29 EUFLEXXA INJECTION PER DOSE  
   sodium hyaluronate (SUPARTZ FX/HYALGAN/GENIVSC) 10 mg/mL syrg injection 3. Bursitis of right shoulder M75.51 726.10 betamethasone (CELESTONE SOLUSPAN) 6 mg/mL injection BETAMETHASONE ACETATE & SODIUM PHOSPHATE INJECTION 3 MG EA.  
   DRAIN/INJECT LARGE JOINT/BURSA  
   bupivacaine, PF, (MARCAINE, PF,) 0.25 % (2.5 mg/mL) injection 4. Winged scapula of right side M95.8 736.89 betamethasone (CELESTONE SOLUSPAN) 6 mg/mL injection BETAMETHASONE ACETATE & SODIUM PHOSPHATE INJECTION 3 MG EA.  
   DRAIN/INJECT LARGE JOINT/BURSA  
   bupivacaine, PF, (MARCAINE, PF,) 0.25 % (2.5 mg/mL) injection 5. Chronic right shoulder pain M25.511 719.41 betamethasone (CELESTONE SOLUSPAN) 6 mg/mL injection G89.29 338.29 BETAMETHASONE ACETATE & SODIUM PHOSPHATE INJECTION 3 MG EA.  
   DRAIN/INJECT LARGE JOINT/BURSA  
   bupivacaine, PF, (MARCAINE, PF,) 0.25 % (2.5 mg/mL) injection PLAN: After discussing treatment options, patient's left knee was injected with 2 cc of Euflexxa and patient's right shoulder was injected with 4 cc Marcaine and 1/2 cc Celestone. He will follow up in one week for Euflexxa #2.  
 
Scribed by [facility]) as dictated by Coco Zeng MD

## 2018-07-13 NOTE — PATIENT INSTRUCTIONS
Knee Arthritis: Exercises Your Care Instructions Here are some examples of exercises for knee arthritis. Start each exercise slowly. Ease off the exercise if you start to have pain. Your doctor or physical therapist will tell you when you can start these exercises and which ones will work best for you. How to do the exercises Knee flexion with heel slide 1. Lie on your back with your knees bent. 2. Slide your heel back by bending your affected knee as far as you can. Then hook your other foot around your ankle to help pull your heel even farther back. 3. Hold for about 6 seconds, then rest for up to 10 seconds. 4. Repeat 8 to 12 times. 5. Switch legs and repeat steps 1 through 4, even if only one knee is sore. [facility] 1. Sit with your affected leg straight and supported on the floor or a firm bed. Place a small, rolled-up towel under your knee. Your other leg should be bent, with that foot flat on the floor. 2. Tighten the thigh muscles of your affected leg by pressing the back of your knee down into the towel. 3. Hold for about 6 seconds, then rest for up to 10 seconds. 4. Repeat 8 to 12 times. 5. Switch legs and repeat steps 1 through 4, even if only one knee is sore. Straight-leg raises to the front 1. Lie on your back with your good knee bent so that your foot rests flat on the floor. Your affected leg should be straight. Make sure that your low back has a normal curve. You should be able to slip your hand in between the floor and the small of your back, with your palm touching the floor and your back touching the back of your hand. 2. Tighten the thigh muscles in your affected leg by pressing the back of your knee flat down to the floor. Hold your knee straight. 3. Keeping the thigh muscles tight and your leg straight, lift your affected leg up so that your heel is about 12 inches off the floor. Hold for about 6 seconds, then lower slowly.  
4. Relax for up to 10 seconds between repetitions. 5. Repeat 8 to 12 times. 6. Switch legs and repeat steps 1 through 5, even if only one knee is sore. Active knee flexion 1. Lie on your stomach with your knees straight. If your kneecap is uncomfortable, roll up a washcloth and put it under your leg just above your kneecap. 2. Lift the foot of your affected leg by bending the knee so that you bring the foot up toward your buttock. If this motion hurts, try it without bending your knee quite as far. This may help you avoid any painful motion. 3. Slowly move your leg up and down. 4. Repeat 8 to 12 times. 5. Switch legs and repeat steps 1 through 4, even if only one knee is sore. Quadriceps stretch (facedown) 1. Lie flat on your stomach, and rest your face on the floor. 2. Wrap a towel or belt strap around the lower part of your affected leg. Then use the towel or belt strap to slowly pull your heel toward your buttock until you feel a stretch. 3. Hold for about 15 to 30 seconds, then relax your leg against the towel or belt strap. 4. Repeat 2 to 4 times. 5. Switch legs and repeat steps 1 through 4, even if only one knee is sore. Stationary exercise bike 1. If you do not have a stationary exercise bike at home, you can find one to ride at your local health club or community center. 2. Adjust the height of the bike seat so that your knee is slightly bent when your leg is extended downward. If your knee hurts when the pedal reaches the top, you can raise the seat so that your knee does not bend as much. 3. Start slowly. At first, try to do 5 to 10 minutes of cycling with little to no resistance. Then increase your time and the resistance bit by bit until you can do 20 to 30 minutes without pain. 4. If you start to have pain, rest your knee until your pain gets back to the level that is normal for you. Or cycle for less time or with less effort. Follow-up care is a key part of your treatment and safety.  Be sure to make and go to all appointments, and call your doctor if you are having problems. It's also a good idea to know your test results and keep a list of the medicines you take. Where can you learn more? Go to http://raúl-leisa.info/. Enter C159 in the search box to learn more about \"Knee Arthritis: Exercises. \" Current as of: November 29, 2017 Content Version: 11.7 © 2006-2018 [employer]. Care instructions adapted under license by [employer] (which disclaims liability or warranty for this information). If you have questions about a medical condition or this instruction, always ask your healthcare professional. [address] any warranty or liability for your use of this information. Hyaluronic Acid (By injection) Hyaluronic Acid (btr-nc-iga-ON-ate AS-id) Treats severe pain in your knee due to osteoarthritis. Brand Name(s): Euflexxa, Gel-One, GelSyn-3, GenVisc 850, Hyalgan, Hymovis, Monovisc, Orthovisc, Supartz FX, Visco-3 There may be other brand names for this medicine. When This Medicine Should Not Be Used: This medicine is not right for everyone. You should not receive it if you had an allergic reaction to hyaluronic acid or if you have a bleeding disorder. How to Use This Medicine:  
Injectable · Your doctor will tell you how many injections you will need. This medicine is injected into your knee joint. · A nurse or other health provider will give you this medicine. Drugs and Foods to Avoid: Ask your doctor or pharmacist before using any other medicine, including over-the-counter medicines, vitamins, and herbal products. Warnings While Using This Medicine: · Tell your doctor if you are pregnant or breastfeeding, or if you have any allergies, including to birds, feathers, or eggs. · Rest your knee for 48 hours after you receive an injection. Do not do strenuous, weightbearing activities, such as jogging or tennis.  Avoid activities that keep you standing for longer than 1 hour. Possible Side Effects While Using This Medicine:  
Call your doctor right away if you notice any of these side effects: · Allergic reaction: Itching or hives, swelling in your face or hands, swelling or tingling in your mouth or throat, chest tightness, trouble breathing If you notice these less serious side effects, talk with your doctor: · Mild increase in pain or swelling in your knee · Pain, redness, or swelling where the medicine is injected If you notice other side effects that you think are caused by this medicine, tell your doctor. Call your doctor for medical advice about side effects. You may report side effects to FDA at 0-310-FDA-2534 © 2017 [address] Information is for End User's use only and may not be sold, redistributed or otherwise used for commercial purposes. The above information is an  only. It is not intended as medical advice for individual conditions or treatments. Talk to your doctor, nurse or pharmacist before following any medical regimen to see if it is safe and effective for you. Shoulder Blade: Exercises Your Care Instructions Here are some examples of typical exercises for your condition. Start each exercise slowly. Ease off the exercise if you start to have pain. Your doctor or physical therapist will tell you when you can start these exercises and which ones will work best for you. How to do the exercises Shoulder roll 6. Stand tall with your chin slightly tucked. Imagine that a string at the top of your head is pulling you straight up. 7. Keep your arms relaxed. All motion will be in your shoulders. 8. Shrug your shoulders up toward your ears, then up and back. [place] your shoulders down and back, like you're sliding your hands down into your back pants pockets. 9. Repeat the circles at least 2 to 4 times. 10. This exercise is also helpful anytime you want to relax.  
Lower neck and upper back stretch 6. With your arms about shoulder height, clasp your hands in front of you. 7. Drop your chin toward your chest. 
8. Reach straight forward so you are rounding your upper back. Think about pulling your shoulder blades apart. Jcarlos Ortiz feel a stretch across your upper back and shoulders. Hold for at least 6 seconds. 9. Repeat 2 to 4 times. Triceps stretch 7. Reach your arm straight up. 8. Keeping your elbow in place, bend your arm and reach your hand down behind your back. 9. With your other hand, apply gentle pressure to the bent elbow. Jcarlos Ortiz feel a stretch at the back of your upper arm and shoulder. Hold about 6 seconds. 10. Repeat 2 to 4 times with each arm. Shoulder stretch 6. Relax your shoulders. 7. Raise one arm to shoulder height, and reach it across your chest. 
8. Pull the arm slightly toward you with your other arm. This will help you get a gentle stretch. Hold for about 6 seconds. 9. Repeat 2 to 4 times. Shoulder blade squeeze 6. Sit or stand up tall with your arms at your sides. 7. Keep your shoulders relaxed and down, not shrugged. 8. Squeeze your shoulder blades together. Hold for 6 seconds, then relax. 9. Repeat 8 to 12 times. Straight-arm shoulder blade squeeze 5. Sit or stand tall. Relax your shoulders. 6. With palms down, hold your elastic tubing or band straight out in front of you. 7. Start with slight tension in the tubing or band, with your hands about shoulder-width apart. 8. Slowly pull straight out to the sides, squeezing your shoulder blades together. Keep your arms straight and at shoulder height. Slowly release. 9. Repeat 8 to 12 times. Rowing 1. [place] your elastic tubing or band at about waist height. Take one end in each hand. 2. Sit or stand with your feet hip-width apart. 3. Hold your arms straight in front of you. Adjust your distance to create slight tension in the tubing or band. 4. Slightly tuck your chin.  Relax your shoulders. 5. Without shrugging your shoulders, pull straight back. Your elbows will pass alongside your waist. 
Pull-downs 1. [place] your elastic tubing or band in the top of a closed door. Take one end in each hand. 2. Either sit or stand, depending on what is more comfortable. If you feel unsteady, sit on a chair. 3. Start with your arms up and comfortably apart, elbows straight. There should be a slight tension in the tubing or band. 4. Slightly tuck your chin, and look straight ahead. 5. Keeping your back straight, slowly pull down and back, bending your elbows. 6. Stop where your hands are level with your chin, in a \"goalpost\" position. 7. Repeat 8 to 12 times. Chest T stretch 1. Lie on your back. Raise your knees so they are bent. Plant your feet on the floor, hip-width apart. 2. Tuck your chin, and relax your shoulders. 3. Reach your arms straight out to the sides. If you don't feel a mild stretch in your shoulders and across your chest, use a foam roll or a tightly rolled blanket under your spine, from your tailbone to your head. 4. Relax in this position for at least 15 to 30 seconds while you breathe normally. Repeat 2 to 4 times. 5. As you get used to this stretch, keep adding a little more time until you are able relax in this position for 2 or 3 minutes. When you can relax for at least 2 minutes, you only need to do the exercise 1 time per session. Chest goalpost stretch 1. Lie on your back. Raise your knees so they are bent. Plant your feet on the floor, hip-width apart. 2. Tuck your chin, and relax your shoulders. 3. Reach your arms straight out to the sides. 4. Bend your arms at the elbows, with your hands pointed toward the top of your head. Your arms should make an L on either side of your head. Your palms should be facing up.  
5. If you don't feel a mild stretch in your shoulders and across your chest, use a foam roll or tightly rolled blanket under your spine, from your tailbone to your head. 6. Relax in this position for at least 15 to 30 seconds while you breathe normally. Repeat 2 to 4 times. 7. Each day you do this exercise, add a little more time until you can relax in this position for 2 or 3 minutes. When you can relax for at least 2 minutes, you only need to do the exercise 1 time per session. Follow-up care is a key part of your treatment and safety. Be sure to make and go to all appointments, and call your doctor if you are having problems. It's also a good idea to know your test results and keep a list of the medicines you take. Where can you learn more? Go to http://raúl-leisa.info/. Enter ([phone] 6212 [id number] in the search box to learn more about \"Shoulder Blade: Exercises. \" Current as of: November 29, 2017 Content Version: 11.7 © 5707-1514 [employer]. Care instructions adapted under license by [employer] (which disclaims liability or warranty for this information). If you have questions about a medical condition or this instruction, always ask your healthcare professional. [place] [address] any warranty or liability for your use of this information.

## 2018-07-20 ENCOUNTER — OFFICE VISIT (OUTPATIENT)
Dept: ORTHOPEDIC SURGERY | Facility: CLINIC | Age: 68
End: 2018-07-20

## 2018-07-20 VITALS
HEIGHT: 72 IN | BODY MASS INDEX: 22.13 KG/M2 | HEART RATE: 75 BPM | SYSTOLIC BLOOD PRESSURE: 99 MMHG | OXYGEN SATURATION: 98 % | DIASTOLIC BLOOD PRESSURE: 65 MMHG | TEMPERATURE: 98.4 F | RESPIRATION RATE: 18 BRPM | WEIGHT: 163.4 LBS

## 2018-07-20 DIAGNOSIS — M25.562 CHRONIC PAIN OF LEFT KNEE: ICD-10-CM

## 2018-07-20 DIAGNOSIS — M17.12 PRIMARY OSTEOARTHRITIS OF LEFT KNEE: Primary | ICD-10-CM

## 2018-07-20 DIAGNOSIS — G89.29 CHRONIC PAIN OF LEFT KNEE: ICD-10-CM

## 2018-07-20 RX ORDER — HYALURONATE SODIUM 10 MG/ML
2 SYRINGE (ML) INTRAARTICULAR ONCE
Qty: 2 ML | Refills: 0
Start: 2018-07-20 | End: 2018-07-20

## 2018-07-20 NOTE — PATIENT INSTRUCTIONS
Knee Arthritis: Exercises Your Care Instructions Here are some examples of exercises for knee arthritis. Start each exercise slowly. Ease off the exercise if you start to have pain. Your doctor or physical therapist will tell you when you can start these exercises and which ones will work best for you. How to do the exercises Knee flexion with heel slide 1. Lie on your back with your knees bent. 2. Slide your heel back by bending your affected knee as far as you can. Then hook your other foot around your ankle to help pull your heel even farther back. 3. Hold for about 6 seconds, then rest for up to 10 seconds. 4. Repeat 8 to 12 times. 5. Switch legs and repeat steps 1 through 4, even if only one knee is sore. iFollo 1. Sit with your affected leg straight and supported on the floor or a firm bed. Place a small, rolled-up towel under your knee. Your other leg should be bent, with that foot flat on the floor. 2. Tighten the thigh muscles of your affected leg by pressing the back of your knee down into the towel. 3. Hold for about 6 seconds, then rest for up to 10 seconds. 4. Repeat 8 to 12 times. 5. Switch legs and repeat steps 1 through 4, even if only one knee is sore. Straight-leg raises to the front 1. Lie on your back with your good knee bent so that your foot rests flat on the floor. Your affected leg should be straight. Make sure that your low back has a normal curve. You should be able to slip your hand in between the floor and the small of your back, with your palm touching the floor and your back touching the back of your hand. 2. Tighten the thigh muscles in your affected leg by pressing the back of your knee flat down to the floor. Hold your knee straight. 3. Keeping the thigh muscles tight and your leg straight, lift your affected leg up so that your heel is about 12 inches off the floor. Hold for about 6 seconds, then lower slowly.  
4. Relax for up to 10 seconds between repetitions. 5. Repeat 8 to 12 times. 6. Switch legs and repeat steps 1 through 5, even if only one knee is sore. Active knee flexion 1. Lie on your stomach with your knees straight. If your kneecap is uncomfortable, roll up a washcloth and put it under your leg just above your kneecap. 2. Lift the foot of your affected leg by bending the knee so that you bring the foot up toward your buttock. If this motion hurts, try it without bending your knee quite as far. This may help you avoid any painful motion. 3. Slowly move your leg up and down. 4. Repeat 8 to 12 times. 5. Switch legs and repeat steps 1 through 4, even if only one knee is sore. Quadriceps stretch (facedown) 1. Lie flat on your stomach, and rest your face on the floor. 2. Wrap a towel or belt strap around the lower part of your affected leg. Then use the towel or belt strap to slowly pull your heel toward your buttock until you feel a stretch. 3. Hold for about 15 to 30 seconds, then relax your leg against the towel or belt strap. 4. Repeat 2 to 4 times. 5. Switch legs and repeat steps 1 through 4, even if only one knee is sore. Stationary exercise bike 1. If you do not have a stationary exercise bike at home, you can find one to ride at your local health club or community center. 2. Adjust the height of the bike seat so that your knee is slightly bent when your leg is extended downward. If your knee hurts when the pedal reaches the top, you can raise the seat so that your knee does not bend as much. 3. Start slowly. At first, try to do 5 to 10 minutes of cycling with little to no resistance. Then increase your time and the resistance bit by bit until you can do 20 to 30 minutes without pain. 4. If you start to have pain, rest your knee until your pain gets back to the level that is normal for you. Or cycle for less time or with less effort. Follow-up care is a key part of your treatment and safety.  Be sure to make and go to all appointments, and call your doctor if you are having problems. It's also a good idea to know your test results and keep a list of the medicines you take. Where can you learn more? Go to http://raúl-leisa.info/. Enter C159 in the search box to learn more about \"Knee Arthritis: Exercises. \" Current as of: November 29, 2017 Content Version: 11.7 © 3849-7481 [employer]. Care instructions adapted under license by [employer] (which disclaims liability or warranty for this information). If you have questions about a medical condition or this instruction, always ask your healthcare professional. [address] any warranty or liability for your use of this information.

## 2018-07-20 NOTE — PROGRESS NOTES
Patient: Karin Mane                MRN: [id number]       SSN: [id number] YOB: 1950        AGE: 74 y.o. SEX: male Body mass index is 22.16 kg/(m^2). PCP: Ninfa Lares MD 
07/20/18 Chief Complaint Patient presents with  Knee Pain Left HISTORY:  Karin Mane is a 74 y.o. male who is seen for left knee pain. ICD-10-CM ICD-9-CM 1. Primary osteoarthritis of left knee M17.12 715.16 CT DRAIN/INJECT LARGE JOINT/BURSA EUFLEXXA INJECTION PER DOSE  
   sodium hyaluronate (SUPARTZ FX/HYALGAN/GENIVSC) 10 mg/mL syrg injection 2. Chronic pain of left knee M25.562 719.46 CT DRAIN/INJECT LARGE JOINT/BURSA  
 G89.29 338.29 EUFLEXXA INJECTION PER DOSE  
   sodium hyaluronate (SUPARTZ FX/HYALGAN/GENIVSC) 10 mg/mL syrg injection PROCEDURE:  After discussing treatment options, Mr. Anuradha Reno left knee was injected with 2 cc of Euflexxa. Chart reviewed for the following: 
 Gina Lindsey MD, have reviewed the History, Physical and updated the Allergic reactions for [facility] [place] TIME OUT performed immediately prior to start of procedure: 
Gina Lindsey MD, have performed the following reviews on Karin Mane prior to the start of the procedure: 
         
* Patient was identified by name and date of birth * Agreement on procedure being performed was verified * Risks and Benefits explained to the patient * Procedure site verified and marked as necessary * Patient was positioned for comfort * Consent was obtained Time: 4:57 PM  
 
Date of procedure: 7/20/2018 Procedure performed by:  Alfonso Reno MD 
 
Mr. Julio Soria tolerated the procedure well with no complications. PLAN:  After discussing treatment options, patient's left knee was injected with 2 cc of Euflexxa. Mr. Julio Soria will follow up in one week to complete his Euflexxa injection series.    
 
Scribed by Rachael Jara [facility]) as dictated by Jenny Antunez MD

## 2018-07-27 ENCOUNTER — OFFICE VISIT (OUTPATIENT)
Dept: ORTHOPEDIC SURGERY | Facility: CLINIC | Age: 68
End: 2018-07-27

## 2018-07-27 VITALS
DIASTOLIC BLOOD PRESSURE: 59 MMHG | HEART RATE: 71 BPM | BODY MASS INDEX: 22.67 KG/M2 | OXYGEN SATURATION: 98 % | TEMPERATURE: 98.7 F | SYSTOLIC BLOOD PRESSURE: 108 MMHG | RESPIRATION RATE: 18 BRPM | HEIGHT: 72 IN | WEIGHT: 167.4 LBS

## 2018-07-27 DIAGNOSIS — G89.29 CHRONIC PAIN OF LEFT KNEE: ICD-10-CM

## 2018-07-27 DIAGNOSIS — M17.12 PRIMARY OSTEOARTHRITIS OF ONE KNEE, LEFT: Primary | ICD-10-CM

## 2018-07-27 DIAGNOSIS — M25.562 CHRONIC PAIN OF LEFT KNEE: ICD-10-CM

## 2018-07-27 RX ORDER — HYALURONATE SODIUM 10 MG/ML
2 SYRINGE (ML) INTRAARTICULAR ONCE
Qty: 2 ML | Refills: 0
Start: 2018-07-27 | End: 2018-07-27

## 2018-07-27 NOTE — PROGRESS NOTES
Patient: Enoc Pinedo                MRN: [id number]       SSN: [id number] YOB: 1950        AGE: 74 y.o. SEX: male Body mass index is 22.7 kg/(m^2). PCP: Elza Rudolph MD 
07/27/18 Chief Complaint Patient presents with  Knee Pain Left HISTORY:  Enoc Pinedo is a 74 y.o. male who is seen for left knee pain. PROCEDURE:  After discussing treatment options, Mr. Pandora Curling left knee was injected with 2 cc of Euflexxa. TIME OUT performed immediately prior to start of procedure: 
Orlando Rodriguez MD, have performed the following reviews on Enoc Pinedo prior to the start of the procedure: 
         
* Patient was identified by name and date of birth * Agreement on procedure being performed was verified * Risks and Benefits explained to the patient * Procedure site verified and marked as necessary * Patient was positioned for comfort * Consent was obtained Time: 9:01 AM  
 
Date of procedure: 7/27/2018 Procedure performed by:  Domo Sosa MD 
 
Mr. Hedy Wylie tolerated the procedure well with no complications ICD-10-CM ICD-9-CM 1. Primary osteoarthritis of one knee, left M17.12 715.16 DE DRAIN/INJECT LARGE JOINT/BURSA EUFLEXXA INJECTION PER DOSE  
   sodium hyaluronate (SUPARTZ FX/HYALGAN/GENIVSC) 10 mg/mL syrg injection 2. Chronic pain of left knee M25.562 719.46 DE DRAIN/INJECT LARGE JOINT/BURSA  
 G89.29 338.29 EUFLEXXA INJECTION PER DOSE  
   sodium hyaluronate (SUPARTZ FX/HYALGAN/GENIVSC) 10 mg/mL syrg injection PLAN:  After discussing treatment options, patient's left knee was injected with 2 cc of Euflexxa. Mr. Hedy Wylie will follow up PRN now that he has completed his Euflexxa injection series.    
 
Scribed by [place] Aldridge [facility]) as dictated by Domo Sosa

## 2019-09-20 ENCOUNTER — OFFICE VISIT (OUTPATIENT)
Dept: ORTHOPEDIC SURGERY | Facility: CLINIC | Age: 69
End: 2019-09-20

## 2019-09-20 VITALS
OXYGEN SATURATION: 97 % | RESPIRATION RATE: 16 BRPM | BODY MASS INDEX: 22.08 KG/M2 | WEIGHT: 163 LBS | HEART RATE: 64 BPM | SYSTOLIC BLOOD PRESSURE: 122 MMHG | TEMPERATURE: 97.7 F | HEIGHT: 72 IN | DIASTOLIC BLOOD PRESSURE: 76 MMHG

## 2019-09-20 DIAGNOSIS — G89.29 CHRONIC RIGHT SHOULDER PAIN: ICD-10-CM

## 2019-09-20 DIAGNOSIS — G89.29 CHRONIC PAIN OF LEFT KNEE: ICD-10-CM

## 2019-09-20 DIAGNOSIS — M75.51 CHRONIC BURSITIS OF RIGHT SHOULDER: ICD-10-CM

## 2019-09-20 DIAGNOSIS — M70.61 TROCHANTERIC BURSITIS OF RIGHT HIP: Primary | ICD-10-CM

## 2019-09-20 DIAGNOSIS — M25.511 CHRONIC RIGHT SHOULDER PAIN: ICD-10-CM

## 2019-09-20 DIAGNOSIS — M25.551 RIGHT HIP PAIN: ICD-10-CM

## 2019-09-20 DIAGNOSIS — M17.12 PRIMARY OSTEOARTHRITIS OF LEFT KNEE: ICD-10-CM

## 2019-09-20 DIAGNOSIS — M25.562 CHRONIC PAIN OF LEFT KNEE: ICD-10-CM

## 2019-09-20 RX ORDER — GABAPENTIN 300 MG/1
300 CAPSULE ORAL 3 TIMES DAILY
COMMUNITY

## 2019-09-20 RX ORDER — BETAMETHASONE SODIUM PHOSPHATE AND BETAMETHASONE ACETATE 3; 3 MG/ML; MG/ML
6 INJECTION, SUSPENSION INTRA-ARTICULAR; INTRALESIONAL; INTRAMUSCULAR; SOFT TISSUE ONCE
Qty: 0.5 ML | Refills: 0
Start: 2019-09-20 | End: 2019-09-20

## 2019-09-20 NOTE — PROGRESS NOTES
1. Have you been to the ER, urgent care clinic since your last visit? Hospitalized since your last visit? NO    2. Have you seen or consulted any other health care providers outside of the [address] [place] [address] since your last visit? Include any pap smears or colon screening.  NO

## 2019-09-20 NOTE — PROGRESS NOTES
Patient: Nneka Stock                MRN: [id number]       SSN: [id number]  YOB: 1950        AGE: 79 y.o. SEX: male    PCP: Adriana Reyes MD  09/20/19    CC: LEFT KNEE, RIGHT SHOULDER, RIGHT HIP PAIN    HISTORY:  Nneka Stock is a 79 y.o. male who is seen for increased left knee, right shoulder, and right hip pain. He states that his left knee will sometimes give out. He has been experiencing knee pain for the past several years. He notes pain with standing, walking, and stair climbing. He experiences startup pain after sitting. He is also seen for right shoulder pain. He reports pain when reaching overhead with his right shoulder. He reports increased stiffness in his right shoulder. He had a fall in 2009 and has had winging of the scapula since. He is s/p cervical fusion c3-c5 in 2010 with some residual nerve damage due to the fall. He states that he has been overlooking his shoulder and hip pain because of his knee and back issues. He is also seen for right hip pain. He reports continued right hip pain. He reports increased low back and right hip pain recently. He used Salonpas on his right hip 1 month ago and developed a reaction to it causing dark discoloration. He is being seen by Dr. Adriana Jackson for his low back pain. He notes a sharp pain radiating from his left hip all the way down his leg. He reports that he injured his hip in 2010 when he fell. He reports that his left knee pain is a result of a service related injuries. He states he has completed several courses of outpatient PT at the [place], most recently on 10/30/17 with little relief. Pain Assessment  9/20/2019   Location of Pain Knee   Pain Location Comment -   Location Modifiers Right;Left   Severity of Pain 8   Quality of Pain Throbbing; Sharp   Quality of Pain Comment -   Duration of Pain Persistent   Frequency of Pain Constant   Aggravating Factors Walking;Standing   Aggravating Factors Comment -   Limiting Behavior Yes   Relieving Factors Other (Comment)   Relieving Factors Comment Epsome Salt baths   Result of Injury Yes   Work-Related Injury No   Type of Injury Other (Comment)   Type of Injury Comment Army injury     Occupation, etc:  Mr. Matt Balderas retired from [employer] in 2005 after 24 years in the [employer]. He works at [employer]. 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 He lives in [place] with his wife. He has 1 adult son living in [place] and 2 adult step children. He states he lost 30 pounds this year. Current weight is 163 pounds. He is 6' tall. He is not hypertensive or diabetic.      No results found for: HBA1C, HGBE8, IWA5ARLE, DIJ6BCMF, UTM3LMOL  Weight Metrics 9/20/2019 7/27/2018 7/20/2018 7/13/2018 6/18/2018 1/26/2018 12/15/2017   Weight 163 lb 167 lb 6.4 oz 163 lb 6.4 oz 162 lb 161 lb 9.6 oz 176 lb 169 lb   BMI 22.11 kg/m2 22.7 kg/m2 22.16 kg/m2 21.97 kg/m2 21.92 kg/m2 23.87 kg/m2 22.92 kg/m2       Patient Active Problem List   Diagnosis Code    Primary localized osteoarthrosis, lower leg M17.10    Osteoarthritis, knee M17.10    Neck pain, acute M54.2    Postlaminectomy syndrome, cervical M96.1    Lumbago M54.5    Lumbosacral neuritis M54.17    Cervicalgia M54.2    Postlaminectomy syndrome, not elsewhere classified M96.1    Low back pain M54.5    Radiculopathy, lumbosacral region M54.17    Chronic right shoulder pain M25.511, G89.29    DDD (degenerative disc disease), cervical M50.30    Degenerative tear of glenoid labrum of right shoulder M24.111    Low back pain without sciatica M54.5    Spondylosis of lumbar region without myelopathy or radiculopathy M47.816    Lumbar neuritis M54.16    DDD (degenerative disc disease), lumbar M51.36    Lumbar discitis M46.46    Other cervical disc degeneration, unspecified cervical region M50.30    Postlaminectomy syndrome M96.1    Other articular cartilage disorders, right shoulder M24.111    Spondylolisthesis, acquired M43.10    Bulging lumbar disc M51.26    Lumbar spinal stenosis M48.061    Tear of meniscus of left knee S83.207A    Other intervertebral disc degeneration, lumbar region M51.36    Other intervertebral disc displacement, lumbar region M51.26    Cervical postlaminectomy syndrome M96.1    Cervical neuritis M54.12    Lumbar herniated disc M51.26     REVIEW OF SYSTEMS: All Below are Negative except: See HPI   Constitutional: negative for fever, chills, and weight loss. Cardiovascular: negative for chest pain, claudication, leg swelling, SOB, GATES   Gastrointestinal: Negative for pain, N/V/C/D, Blood in stool or urine, dysuria,  hematuria, incontinence, pelvic pain. Musculoskeletal: See HPI   Neurological: Negative for dizziness and weakness. Negative for headaches, Visual changes, confusion, seizures   Phychiatric/Behavioral: Negative for depression, memory loss, substance  abuse. Extremities: Negative for hair changes, rash, or skin lesion changes. Hematologic: Negative for bleeding problems, bruising, pallor or swollen lymph  nodes   Peripheral Vascular: No calf pain, no circulation deficits.     Social History     Socioeconomic History    Marital status:      Spouse name: Not on file    Number of children: Not on file    Years of education: Not on file    Highest education level: Not on file   Occupational History    Occupation: Retired   Social Needs    Financial resource strain: Not on file    Food insecurity:     Worry: Not on file     Inability: Not on file   Poliana needs:     Medical: Not on file     Non-medical: Not on file   Tobacco Use    Smoking status: Current Every Day Smoker     Packs/day: 0.25     Years: 13.00     Pack years: 3.25     Types: Cigarettes    Smokeless tobacco: Never Used    Tobacco comment: 9 cigarettes per day   Substance and Sexual Activity    Alcohol use: Yes    Drug use: Not on file    Sexual activity: Not Currently     Partners: Female   Lifestyle    Physical activity:     Days per week: Not on file     Minutes per session: Not on file    Stress: Not on file   Relationships    Social connections:     Talks on phone: Not on file     Gets together: Not on file     Attends Taoism service: Not on file     Active member of club or organization: Not on file     Attends meetings of clubs or organizations: Not on file     Relationship status: Not on file    Intimate partner violence:     Fear of current or ex partner: Not on file     Emotionally abused: Not on file     Physically abused: Not on file     Forced sexual activity: Not on file   Other Topics Concern    Not on file   Social History Narrative    Not on file      No Known Allergies   Current Outpatient Medications   Medication Sig    gabapentin (NEURONTIN) 300 mg capsule Take 300 mg by mouth three (3) times daily.  gabapentin (NEURONTIN) 300 mg capsule Take 300 mg by mouth three (3) times daily.  cholecalciferol (VITAMIN D3) 1,000 unit cap Take  by mouth daily.  acetaminophen (TYLENOL ARTHRITIS PAIN) 650 mg CR tablet Take 650 mg by mouth every six (6) hours as needed for Pain.  Omeprazole delayed release (PRILOSEC D/R) 20 mg tablet 20 mg.    baclofen (LIORESAL) 10 mg tablet Take 20 mg by mouth three (3) times daily.  aspirin delayed-release 81 mg tablet Take  by mouth daily.  lisinopril-hydrochlorothiazide (PRINZIDE, ZESTORETIC) 20-12.5 mg per tablet Take  by mouth daily. No current facility-administered medications for this visit.        PHYSICAL EXAMINATION:  Visit Vitals  /76 (BP 1 Location: Left arm, BP Patient Position: Sitting)   Pulse 64   Temp 97.7 °F (36.5 °C) (Oral)   Resp 16   Ht 6' (1.829 m)   Wt 163 lb (73.9 kg)   SpO2 97%   BMI 22.11 kg/m²      ORTHO EXAMINATION:  Examination Right hip Left hip   Skin Intact, dark discoloration lateral Intact   External Rotation ROM 20 20   Internal Rotation ROM 10 10   Trochanteric tenderness +, posterior -   Hip flexion contracture - -   Antalgic gait - -   Trendelenberg sign - -   Lumbar tenderness - -   Straight leg raise - -   Calf tenderness - -   Neurovascular Intact Intact     Examination Right knee Left knee   Skin Intact Intact   Range of motion 115-0 100-5   Effusion - -   Medial joint line tenderness - +   Lateral joint line tenderness - -   Popliteal tenderness - -   Osteophytes palpable - +, medial and lateral   Nazarios - -   Patella crepitus - -   Anterior drawer - -   Lateral laxity - -   Medial laxity - -   Varus deformity - -   Valgus deformity - -   Pretibial edema - -   Calf tenderness - -   Wearing a soft knee brace  Ambulates using a tri-pod cane in his left hand - sometimes    Examination Right shoulder Left shoulder   Skin Intact Intact   Effusion - -   Biceps deformity - -   Atrophy +deltoid -   AC joint tenderness - -   Acromial tenderness + +   Biceps tenderness - -   Forward flexion/Elevation  170   Active abduction  160   External rotation ROM 10 30   Internal rotation ROM 60 90   Apprehension - -   Impingement - -   Drop Arm Test - -   Neurovascular Intact Intact   Winging of the right scapula  Limited ROM of the right arm  asymmetry and musculat atrophy right shoulder    Chart reviewed for the following:   ILeonela MD, have reviewed the History, Physical and updated the Allergic reactions for [address] performed immediately prior to start of procedure:  Manuel Paz MD, have performed the following reviews on [facility] prior to the start of the procedure:            * Patient was identified by name and date of birth   * Agreement on procedure being performed was verified  * Risks and Benefits explained to the patient  * Procedure site verified and marked as necessary  * Patient was positioned for comfort  * Consent was obtained     Time: 9:20 AM    Date of procedure: 9/20/2019    Procedure performed by:  Garima Su MD    Mr. Osito James tolerated the procedure well with no complications. MRI LEFT KNEE WO CONT 5/11/17  IMPRESSION:   1. Edema and fluid within ACL tendon fibers, likely ganglion cyst versus low-grade partial tear. Other major ligaments appear intact.   -Lateral meniscus possible posterior horn tear. No definite medial meniscus tear. 2. Patchy high-grade cartilage loss in the patellofemoral and medial compartments, grade 3-4. 3. Edema in the suprapatellar fat pad may be seen with quadriceps fat pad impingement. Trace joint effusion. Minimal Hoffa's fat pad edema.   -Small amount of fluid surrounding the semimembranosus tendon, possibly bursitis or tracking popliteal cyst.     RADIOGRAPHS:  XR RIGHT HIP 12/1/17  IMPRESSION:  AP pelvis and two views - No fractures, no joint space narrowing, no osteophytes present. XR LEFT KNEE 11/3/17  IMPRESSION:  Three views - No fractures, no effusion, L>R medial joint space narrowing, + medial and lateral osteophytes present. Moderately severe L, moderate R    XR RIGHT SHOULDER 6/30/11 [facility]  IMPRESSION:  Three views - No fractures, mild acromioclavicular narrowing, no glenohumeral narrowing, no calcific densities, cystic changes    IMPRESSION:      ICD-10-CM ICD-9-CM    1. Trochanteric bursitis of right hip M70.61 726.5 betamethasone (CELESTONE SOLUSPAN) 6 mg/mL injection      BETAMETHASONE ACETATE & SODIUM PHOSPHATE INJECTION 3 MG EA.      DRAIN/INJECT LARGE JOINT/BURSA   2. Right hip pain M25.551 719.45 betamethasone (CELESTONE SOLUSPAN) 6 mg/mL injection      BETAMETHASONE ACETATE & SODIUM PHOSPHATE INJECTION 3 MG EA.      DRAIN/INJECT LARGE JOINT/BURSA   3.  Chronic bursitis of right shoulder M75.51 726.10 betamethasone (CELESTONE SOLUSPAN) 6 mg/mL injection      BETAMETHASONE ACETATE & SODIUM PHOSPHATE INJECTION 3 MG EA.      DRAIN/INJECT LARGE JOINT/BURSA   4. Chronic right shoulder pain M25.511 719.41 betamethasone (CELESTONE SOLUSPAN) 6 mg/mL injection    G89.29 338.29 BETAMETHASONE ACETATE & SODIUM PHOSPHATE INJECTION 3 MG EA.      DRAIN/INJECT LARGE JOINT/BURSA   5. Primary osteoarthritis of left knee M17.12 715.16 betamethasone (CELESTONE SOLUSPAN) 6 mg/mL injection      BETAMETHASONE ACETATE & SODIUM PHOSPHATE INJECTION 3 MG EA.      DRAIN/INJECT LARGE JOINT/BURSA      PROCEDURE AUTHORIZATION TO    6. Chronic pain of left knee M25.562 719.46 betamethasone (CELESTONE SOLUSPAN) 6 mg/mL injection    G89.29 338.29 BETAMETHASONE ACETATE & SODIUM PHOSPHATE INJECTION 3 MG EA.      DRAIN/INJECT LARGE JOINT/BURSA      PROCEDURE AUTHORIZATION TO      PLAN: After discussing treatment options, patient's right lateral hip, right shoulder, and left knee were injected with 4 cc Marcaine and 1/2 cc Celestone. Consider visco supplementation if pain continues. There is no need for surgery at this time. He will follow up as needed.      Scribed by Darin House  (Noé Gonzales) as dictated by Nasir Bloom MD

## 2019-10-18 ENCOUNTER — OFFICE VISIT (OUTPATIENT)
Dept: ORTHOPEDIC SURGERY | Facility: CLINIC | Age: 69
End: 2019-10-18

## 2019-10-18 VITALS
BODY MASS INDEX: 22.51 KG/M2 | TEMPERATURE: 98 F | SYSTOLIC BLOOD PRESSURE: 118 MMHG | OXYGEN SATURATION: 100 % | WEIGHT: 166.2 LBS | HEART RATE: 78 BPM | RESPIRATION RATE: 18 BRPM | HEIGHT: 72 IN | DIASTOLIC BLOOD PRESSURE: 74 MMHG

## 2019-10-18 DIAGNOSIS — G89.29 CHRONIC PAIN OF LEFT KNEE: ICD-10-CM

## 2019-10-18 DIAGNOSIS — M95.8 WINGED SCAPULA OF RIGHT SIDE: ICD-10-CM

## 2019-10-18 DIAGNOSIS — M25.511 CHRONIC RIGHT SHOULDER PAIN: ICD-10-CM

## 2019-10-18 DIAGNOSIS — M54.2 CERVICAL PAIN: ICD-10-CM

## 2019-10-18 DIAGNOSIS — M17.12 PRIMARY OSTEOARTHRITIS OF LEFT KNEE: ICD-10-CM

## 2019-10-18 DIAGNOSIS — M25.562 CHRONIC PAIN OF LEFT KNEE: ICD-10-CM

## 2019-10-18 DIAGNOSIS — G89.29 CHRONIC RIGHT SHOULDER PAIN: ICD-10-CM

## 2019-10-18 DIAGNOSIS — M75.51 CHRONIC BURSITIS OF RIGHT SHOULDER: ICD-10-CM

## 2019-10-18 DIAGNOSIS — M54.12 CERVICAL RADICULOPATHY: ICD-10-CM

## 2019-10-18 DIAGNOSIS — M75.01 ADHESIVE CAPSULITIS OF RIGHT SHOULDER: Primary | ICD-10-CM

## 2019-10-18 RX ORDER — HYALURONATE SODIUM 10 MG/ML
2 SYRINGE (ML) INTRAARTICULAR ONCE
Qty: 2 ML | Refills: 0
Start: 2019-10-18 | End: 2019-10-18

## 2019-10-18 RX ORDER — BETAMETHASONE SODIUM PHOSPHATE AND BETAMETHASONE ACETATE 3; 3 MG/ML; MG/ML
6 INJECTION, SUSPENSION INTRA-ARTICULAR; INTRALESIONAL; INTRAMUSCULAR; SOFT TISSUE ONCE
Qty: 0.5 ML | Refills: 0
Start: 2019-10-18 | End: 2019-10-18

## 2019-10-18 NOTE — PROGRESS NOTES
Patient: Zee Abad                MRN: [id number]       SSN: [id number]  YOB: 1950        AGE: 81 y.o. SEX: male    PCP: Vidal Claudio MD  10/18/19    CC: LEFT KNEE, RIGHT SHOULDER AND NECK PAIN    HISTORY:  Zee Abad is a 81 y.o. male who is seen for left knee, right shoulder, and neck pain. He reports pain when reaching overhead with his right shoulder. He thinks he slept wrong last night and aggravated his neck pain. His neck pain radiates into his right shoulder. He reports increased stiffness in his right shoulder. He had a fall in 2009 and has had winging of the scapula since. He is s/p cervical fusion c3-c5 in 2010 with some residual nerve damage due to the fall. He states that he has been overlooking his shoulder and hip pain because of his knee and back issues. He is also seen for left knee pain. He states that his left knee will sometimes give out. He has been experiencing knee pain for the past several years. He notes pain with standing, walking, and stair climbing. He experiences startup pain after sitting. He is also seen for right hip pain. He reports continued right hip pain. He reports increased low back and right hip pain recently. He used Salonpas on his right hip 1 month ago and developed a reaction to it causing dark discoloration. He is being seen by Dr. Sharon Brewer for his low back pain. He notes a sharp pain radiating from his left hip all the way down his leg. He reports that he injured his hip in 2010 when he fell. He reports that his left knee pain is a result of a service related injuries. He states he has completed several courses of outpatient PT at the [place], most recently on 10/30/17 with little relief. Pain Assessment  10/18/2019   Location of Pain Knee   Pain Location Comment -   Location Modifiers Left   Severity of Pain 8   Quality of Pain Sharp; Aching   Quality of Pain Comment -   Duration of Pain Persistent   Frequency of Pain Constant   Aggravating Factors Walking;Standing;Bending   Aggravating Factors Comment -   Limiting Behavior Yes   Relieving Factors Other (Comment)   Relieving Factors Comment Voltaren gel   Result of Injury No   Work-Related Injury -   Type of Injury -   Type of Injury Comment -     Occupation, etc:  Mr. Juan J Hartley retired from [employer] in 2005 after 24 years in the [employer]. He works at [employer]. 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 He lives in [place] with his wife. He has 1 adult son living in [address] [place] [address] and 2 adult step children. He states he lost 30 pounds this year. Current weight is 163 pounds. He is 6' tall. He is not hypertensive or diabetic.      No results found for: HBA1C, HGBE8, PSW3KXIM, FWR1CHZX, XVK6GWCD  Weight Metrics 10/18/2019 9/20/2019 7/27/2018 7/20/2018 7/13/2018 6/18/2018 1/26/2018   Weight 166 lb 3.2 oz 163 lb 167 lb 6.4 oz 163 lb 6.4 oz 162 lb 161 lb 9.6 oz 176 lb   BMI 22.54 kg/m2 22.11 kg/m2 22.7 kg/m2 22.16 kg/m2 21.97 kg/m2 21.92 kg/m2 23.87 kg/m2       Patient Active Problem List   Diagnosis Code    Primary localized osteoarthrosis, lower leg M17.10    Osteoarthritis, knee M17.10    Neck pain, acute M54.2    Postlaminectomy syndrome, cervical M96.1    Lumbago M54.5    Lumbosacral neuritis M54.17    Cervicalgia M54.2    Postlaminectomy syndrome, not elsewhere classified M96.1    Low back pain M54.5    Radiculopathy, lumbosacral region M54.17    Chronic right shoulder pain M25.511, G89.29    DDD (degenerative disc disease), cervical M50.30    Degenerative tear of glenoid labrum of right shoulder M24.111    Low back pain without sciatica M54.5    Spondylosis of lumbar region without myelopathy or radiculopathy M47.816    Lumbar neuritis M54.16    DDD (degenerative disc disease), lumbar M51.36    Lumbar discitis M46.46    Other cervical disc degeneration, unspecified cervical region M50.30    Postlaminectomy syndrome M96.1    Other articular cartilage disorders, right shoulder M24.111    Spondylolisthesis, acquired M43.10    Bulging lumbar disc M51.26    Lumbar spinal stenosis M48.061    Tear of meniscus of left knee S83.207A    Other intervertebral disc degeneration, lumbar region M51.36    Other intervertebral disc displacement, lumbar region M51.26    Cervical postlaminectomy syndrome M96.1    Cervical neuritis M54.12    Lumbar herniated disc M51.26     REVIEW OF SYSTEMS: All Below are Negative except: See HPI   Constitutional: negative for fever, chills, and weight loss. Cardiovascular: negative for chest pain, claudication, leg swelling, SOB, GATES   Gastrointestinal: Negative for pain, N/V/C/D, Blood in stool or urine, dysuria,  hematuria, incontinence, pelvic pain. Musculoskeletal: See HPI   Neurological: Negative for dizziness and weakness. Negative for headaches, Visual changes, confusion, seizures   Phychiatric/Behavioral: Negative for depression, memory loss, substance  abuse. Extremities: Negative for hair changes, rash, or skin lesion changes. Hematologic: Negative for bleeding problems, bruising, pallor or swollen lymph  nodes   Peripheral Vascular: No calf pain, no circulation deficits.     Social History     Socioeconomic History    Marital status:      Spouse name: Not on file    Number of children: Not on file    Years of education: Not on file    Highest education level: Not on file   Occupational History    Occupation: Retired   Social Needs    Financial resource strain: Not on file    Food insecurity:     Worry: Not on file     Inability: Not on file   Oh My Green! needs:     Medical: Not on file     Non-medical: Not on file   Tobacco Use    Smoking status: Current Every Day Smoker     Packs/day: 0.25     Years: 13.00     Pack years: 3.25     Types: Cigarettes    Smokeless tobacco: Never Used    Tobacco comment: 9 cigarettes per day   Substance and Sexual Activity    Alcohol use: Yes    Drug use: Not on file    Sexual activity: Not Currently     Partners: Female   Lifestyle    Physical activity:     Days per week: Not on file     Minutes per session: Not on file    Stress: Not on file   Relationships    Social connections:     Talks on phone: Not on file     Gets together: Not on file     Attends [employer] service: Not on file     Active member of club or organization: Not on file     Attends meetings of clubs or organizations: Not on file     Relationship status: Not on file    Intimate partner violence:     Fear of current or ex partner: Not on file     Emotionally abused: Not on file     Physically abused: Not on file     Forced sexual activity: Not on file   Other Topics Concern    Not on file   Social History Narrative    Not on file      No Known Allergies   Current Outpatient Medications   Medication Sig    gabapentin (NEURONTIN) 300 mg capsule Take 300 mg by mouth three (3) times daily.  cholecalciferol (VITAMIN D3) 1,000 unit cap Take  by mouth daily.  acetaminophen (TYLENOL ARTHRITIS PAIN) 650 mg CR tablet Take 650 mg by mouth every six (6) hours as needed for Pain.  Omeprazole delayed release (PRILOSEC D/R) 20 mg tablet 20 mg.    baclofen (LIORESAL) 10 mg tablet Take 20 mg by mouth three (3) times daily.  aspirin delayed-release 81 mg tablet Take  by mouth daily.  lisinopril-hydrochlorothiazide (PRINZIDE, ZESTORETIC) 20-12.5 mg per tablet Take  by mouth daily.  gabapentin (NEURONTIN) 300 mg capsule Take 300 mg by mouth three (3) times daily. No current facility-administered medications for this visit.        PHYSICAL EXAMINATION:  Visit Vitals  /74   Pulse 78   Temp 98 °F (36.7 °C) (Oral)   Resp 18   Ht 6' (1.829 m)   Wt 166 lb 3.2 oz (75.4 kg)   SpO2 100%   BMI 22.54 kg/m²      ORTHO EXAMINATION:  Examination Right hip Left hip   Skin Intact, dark discoloration lateral Intact   External Rotation ROM 20 20   Internal Rotation ROM 10 10   Trochanteric tenderness +, posterior -   Hip flexion contracture - -   Antalgic gait - -   Trendelenberg sign - -   Lumbar tenderness - -   Straight leg raise - -   Calf tenderness - -   Neurovascular Intact Intact     Examination Right knee Left knee   Skin Intact Intact   Range of motion 115-0 100-5   Effusion - -   Medial joint line tenderness - +   Lateral joint line tenderness - -   Popliteal tenderness - -   Osteophytes palpable - +, medial and lateral   Nazarios - -   Patella crepitus - -   Anterior drawer - -   Lateral laxity - -   Medial laxity - -   Varus deformity - -   Valgus deformity - -   Pretibial edema - -   Calf tenderness - -   Wearing a soft knee brace  Ambulates using a tri-pod cane in his left hand - sometimes    Examination Right shoulder Left shoulder   Skin Intact Intact   Effusion - -   Biceps deformity - -   Atrophy +deltoid -   AC joint tenderness - -   Acromial tenderness + -   Biceps tenderness - -   Forward flexion/Elevation  170   Active abduction  160   External rotation ROM 10 30   Internal rotation ROM 60 90   Apprehension - -   Impingement - -   Drop Arm Test - -   Neurovascular Intact Intact   Winging of the right scapula  Limited ROM of the right arm  asymmetry and musculat atrophy right shoulder    Examination Neck   Skin Intact   Tenderness + right paracervical   Tightness + right paracervical   Flexion Decreased 25%   Extension Decreased 25%   Lateral bend left Normal   Lateral bend right Normal   Masses -   Biceps reflex Normal   Triceps reflex Normal   Brachioradialis reflex Normal         Chart reviewed for the following:   I, Senia Cooper MD, have reviewed the History, Physical and updated the Allergic reactions for [address] performed immediately prior to start of procedure:  Tamra Flowers MD, have performed the following reviews on [place] [address] prior to the start of the procedure:            * Patient was identified by name and date of birth   * Agreement on procedure being performed was verified  * Risks and Benefits explained to the patient  * Procedure site verified and marked as necessary  * Patient was positioned for comfort  * Consent was obtained     Time: 9:00 AM    Date of procedure: 10/18/2019    Procedure performed by:  Wade Pressley MD    Mr. Alex Julien tolerated the procedure well with no complications. MRI LEFT KNEE WO CONT 5/11/17  IMPRESSION:   1. Edema and fluid within ACL tendon fibers, likely ganglion cyst versus low-grade partial tear. Other major ligaments appear intact.   -Lateral meniscus possible posterior horn tear. No definite medial meniscus tear. 2. Patchy high-grade cartilage loss in the patellofemoral and medial compartments, grade 3-4. 3. Edema in the suprapatellar fat pad may be seen with quadriceps fat pad impingement. Trace joint effusion. Minimal Hoffa's fat pad edema.   -Small amount of fluid surrounding the semimembranosus tendon, possibly bursitis or tracking popliteal cyst.     RADIOGRAPHS:  XR RIGHT HIP 12/1/17  IMPRESSION:  AP pelvis and two views - No fractures, no joint space narrowing, no osteophytes present. XR LEFT KNEE 11/3/17  IMPRESSION:  Three views - No fractures, no effusion, L>R medial joint space narrowing, + medial and lateral osteophytes present. Moderately severe L, moderate R    XR RIGHT SHOULDER 6/30/11 VAZQUEZ  IMPRESSION:  Three views - No fractures, mild acromioclavicular narrowing, no glenohumeral narrowing, no calcific densities, cystic changes    IMPRESSION:      ICD-10-CM ICD-9-CM    1.  Adhesive capsulitis of right shoulder M75.01 726.0 betamethasone (CELESTONE SOLUSPAN) 6 mg/mL injection      BETAMETHASONE ACETATE & SODIUM PHOSPHATE INJECTION 3 MG EA.      DRAIN/INJECT LARGE JOINT/BURSA 2. Primary osteoarthritis of left knee M17.12 715.16 MN DRAIN/INJECT LARGE JOINT/BURSA      EUFLEXXA INJECTION PER DOSE      sodium hyaluronate (SUPARTZ FX/HYALGAN/GENIVSC) 10 mg/mL syrg injection   3. Chronic pain of left knee M25.562 719.46 MN DRAIN/INJECT LARGE JOINT/BURSA    G89.29 338.29 EUFLEXXA INJECTION PER DOSE      sodium hyaluronate (SUPARTZ FX/HYALGAN/GENIVSC) 10 mg/mL syrg injection   4. Chronic bursitis of right shoulder M75.51 726.10 betamethasone (CELESTONE SOLUSPAN) 6 mg/mL injection      BETAMETHASONE ACETATE & SODIUM PHOSPHATE INJECTION 3 MG EA.      DRAIN/INJECT LARGE JOINT/BURSA   5. Chronic right shoulder pain M25.511 719.41 betamethasone (CELESTONE SOLUSPAN) 6 mg/mL injection    G89.29 338.29 BETAMETHASONE ACETATE & SODIUM PHOSPHATE INJECTION 3 MG EA.      DRAIN/INJECT LARGE JOINT/BURSA   6. Cervical pain M54.2 723.1 betamethasone (CELESTONE SOLUSPAN) 6 mg/mL injection      BETAMETHASONE ACETATE & SODIUM PHOSPHATE INJECTION 3 MG EA. INJECT TRIGGER POINT, 1 OR 2   7. Cervical radiculopathy M54.12 723.4 betamethasone (CELESTONE SOLUSPAN) 6 mg/mL injection      BETAMETHASONE ACETATE & SODIUM PHOSPHATE INJECTION 3 MG EA. INJECT TRIGGER POINT, 1 OR 2   8. 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 There is no need for surgery at this time. He will follow up in one week for Euflexxa #2.     Scribed by Lidia Oconnor  ([address]) as dictated by Nereyda Jaquez MD

## 2019-10-18 NOTE — PROGRESS NOTES
Verbal order given by Dr. Saba Mantle to draw up 4 mL 0.25% Sensorcaine and 0.5 mL 30 mg/5mL Betamethasone x 2

## 2019-10-25 ENCOUNTER — OFFICE VISIT (OUTPATIENT)
Dept: ORTHOPEDIC SURGERY | Facility: CLINIC | Age: 69
End: 2019-10-25

## 2019-10-25 VITALS
BODY MASS INDEX: 22.75 KG/M2 | HEIGHT: 72 IN | SYSTOLIC BLOOD PRESSURE: 98 MMHG | DIASTOLIC BLOOD PRESSURE: 66 MMHG | HEART RATE: 85 BPM | WEIGHT: 168 LBS | TEMPERATURE: 97.7 F | OXYGEN SATURATION: 100 % | RESPIRATION RATE: 18 BRPM

## 2019-10-25 DIAGNOSIS — G89.29 CHRONIC PAIN OF LEFT KNEE: ICD-10-CM

## 2019-10-25 DIAGNOSIS — M17.12 PRIMARY OSTEOARTHRITIS OF LEFT KNEE: Primary | ICD-10-CM

## 2019-10-25 DIAGNOSIS — M25.562 CHRONIC PAIN OF LEFT KNEE: ICD-10-CM

## 2019-10-25 RX ORDER — HYALURONATE SODIUM 10 MG/ML
2 SYRINGE (ML) INTRAARTICULAR ONCE
Qty: 2 ML | Refills: 0
Start: 2019-10-25 | End: 2019-10-25

## 2019-10-25 NOTE — PROGRESS NOTES
Patient: Enzo Hurt                MRN: [id number]       SSN: [id number]  YOB: 1950        AGE: 81 y.o. SEX: male  Body mass index is 22.78 kg/m². PCP: Stephan Stone MD  10/25/19    Chief Complaint   Patient presents with    Knee Pain     Left     HISTORY:  Enzo Hurt is a 81 y.o. male who is seen for left knee pain. ICD-10-CM ICD-9-CM    1. Primary osteoarthritis of left knee M17.12 715.16 MS DRAIN/INJECT LARGE JOINT/BURSA      EUFLEXXA INJECTION PER DOSE      sodium hyaluronate (SUPARTZ FX/HYALGAN/GENIVSC) 10 mg/mL syrg injection   2. Chronic pain of left knee M25.562 719.46 MS DRAIN/INJECT LARGE JOINT/BURSA    G89.29 338.29 EUFLEXXA INJECTION PER DOSE      sodium hyaluronate (SUPARTZ FX/HYALGAN/GENIVSC) 10 mg/mL syrg injection       Chart reviewed for the following:   Guera Earl MD, have reviewed the History, Physical and updated the Allergic reactions for [address] performed immediately prior to start of procedure:  Guera Earl MD, have performed the following reviews on Enzo Hurt prior to the start of the procedure:            * Patient was identified by name and date of birth   * Agreement on procedure being performed was verified  * Risks and Benefits explained to the patient  * Procedure site verified and marked as necessary  * Patient was positioned for comfort  * Consent was obtained     Time: 5:20 PM     Date of procedure: 10/25/2019    Procedure performed by:  Matt Decker MD    Mr. Bam Callaway tolerated the procedure well with no complications. PLAN:  After discussing treatment options, patient's left knee was injected with 2 cc of Euflexxa. Mr. Bam Callaway will follow up in one week to continue his visco supplementation injection series.       Scribed by Brii Mccracken ([address]) as dictated by Matt Decker MD

## 2019-11-01 ENCOUNTER — OFFICE VISIT (OUTPATIENT)
Dept: ORTHOPEDIC SURGERY | Facility: CLINIC | Age: 69
End: 2019-11-01

## 2019-11-01 VITALS
HEART RATE: 68 BPM | SYSTOLIC BLOOD PRESSURE: 123 MMHG | RESPIRATION RATE: 16 BRPM | WEIGHT: 169.4 LBS | DIASTOLIC BLOOD PRESSURE: 73 MMHG | TEMPERATURE: 96.9 F | BODY MASS INDEX: 22.94 KG/M2 | HEIGHT: 72 IN | OXYGEN SATURATION: 99 %

## 2019-11-01 DIAGNOSIS — M17.12 PRIMARY OSTEOARTHRITIS OF LEFT KNEE: Primary | ICD-10-CM

## 2019-11-01 DIAGNOSIS — G89.29 CHRONIC PAIN OF LEFT KNEE: ICD-10-CM

## 2019-11-01 DIAGNOSIS — M25.562 CHRONIC PAIN OF LEFT KNEE: ICD-10-CM

## 2019-11-01 RX ORDER — HYALURONATE SODIUM 10 MG/ML
2 SYRINGE (ML) INTRAARTICULAR ONCE
Qty: 2 ML | Refills: 0
Start: 2019-11-01 | End: 2019-11-01

## 2019-11-01 NOTE — PROGRESS NOTES
Patient: Isabel Quiñones                MRN: [id number]       SSN: [id number]  YOB: 1950        AGE: 81 y.o. SEX: male  Body mass index is 22.97 kg/m². PCP: Shonna Hazel MD  11/01/19    Chief Complaint   Patient presents with    Knee Pain     left knee pain     HISTORY:  Isabel Quiñones is a 81 y.o. male who is seen for left knee pain. TIME OUT performed immediately prior to start of procedure:  Db Godfrey MD, have performed the following reviews on Isabel Quiñones prior to the start of the procedure:            * Patient was identified by name and date of birth   * Agreement on procedure being performed was verified  * Risks and Benefits explained to the patient  * Procedure site verified and marked as necessary  * Patient was positioned for comfort  * Consent was obtained     Time: 8:40 AM     Date of procedure: 11/1/2019    Procedure performed by:  Adeel Martin MD    Mr. Merlyn Beckford tolerated the procedure well with no complications      [id number] ICD-9-CM    1. Primary osteoarthritis of left knee M17.12 715.16 ME DRAIN/INJECT LARGE JOINT/BURSA      EUFLEXXA INJECTION PER DOSE      sodium hyaluronate (SUPARTZ FX/HYALGAN/GENIVSC) 10 mg/mL syrg injection   2. Chronic pain of left knee M25.562 719.46 ME DRAIN/INJECT LARGE JOINT/BURSA    G89.29 338.29 EUFLEXXA INJECTION PER DOSE      sodium hyaluronate (SUPARTZ FX/HYALGAN/GENIVSC) 10 mg/mL syrg injection     PLAN:  After discussing treatment options, patient's left knee was injected with 2 cc of Euflexxa. Mr. Merlyn Beckford will follow up PRN now that he has completed his visco supplementation injection series.     Scribed by Ailyn Benson ([address]) as dictated by Adeel Martin MD

## 2019-11-01 NOTE — PROGRESS NOTES
1. Have you been to the ER, urgent care clinic since your last visit? Hospitalized since your last visit? No    2. Have you seen or consulted any other health care providers outside of the [address] [place] [address] since your last visit? Include any pap smears or colon screening.  No

## 2022-03-18 ENCOUNTER — OFFICE VISIT (OUTPATIENT)
Dept: ORTHOPEDIC SURGERY | Age: 72
End: 2022-03-18
Payer: MEDICARE

## 2022-03-18 VITALS — HEART RATE: 102 BPM | OXYGEN SATURATION: 96 % | TEMPERATURE: 97.3 F

## 2022-03-18 DIAGNOSIS — M17.12 PRIMARY OSTEOARTHRITIS OF LEFT KNEE: Primary | ICD-10-CM

## 2022-03-18 DIAGNOSIS — M25.562 CHRONIC PAIN OF LEFT KNEE: ICD-10-CM

## 2022-03-18 DIAGNOSIS — M54.50 LUMBAR PAIN: ICD-10-CM

## 2022-03-18 DIAGNOSIS — M25.551 CHRONIC PAIN OF RIGHT HIP: ICD-10-CM

## 2022-03-18 DIAGNOSIS — M70.61 TROCHANTERIC BURSITIS, RIGHT HIP: ICD-10-CM

## 2022-03-18 DIAGNOSIS — G89.29 CHRONIC PAIN OF RIGHT HIP: ICD-10-CM

## 2022-03-18 DIAGNOSIS — G89.29 CHRONIC PAIN OF LEFT KNEE: ICD-10-CM

## 2022-03-18 PROCEDURE — G8427 DOCREV CUR MEDS BY ELIG CLIN: HCPCS | Performed by: SPECIALIST

## 2022-03-18 PROCEDURE — G8536 NO DOC ELDER MAL SCRN: HCPCS | Performed by: SPECIALIST

## 2022-03-18 PROCEDURE — 20610 DRAIN/INJ JOINT/BURSA W/O US: CPT | Performed by: SPECIALIST

## 2022-03-18 PROCEDURE — G8421 BMI NOT CALCULATED: HCPCS | Performed by: SPECIALIST

## 2022-03-18 PROCEDURE — 73562 X-RAY EXAM OF KNEE 3: CPT | Performed by: SPECIALIST

## 2022-03-18 PROCEDURE — 99213 OFFICE O/P EST LOW 20 MIN: CPT | Performed by: SPECIALIST

## 2022-03-18 PROCEDURE — 1101F PT FALLS ASSESS-DOCD LE1/YR: CPT | Performed by: SPECIALIST

## 2022-03-18 PROCEDURE — G8432 DEP SCR NOT DOC, RNG: HCPCS | Performed by: SPECIALIST

## 2022-03-18 PROCEDURE — 3017F COLORECTAL CA SCREEN DOC REV: CPT | Performed by: SPECIALIST

## 2022-03-18 RX ORDER — BETAMETHASONE SODIUM PHOSPHATE AND BETAMETHASONE ACETATE 3; 3 MG/ML; MG/ML
3 INJECTION, SUSPENSION INTRA-ARTICULAR; INTRALESIONAL; INTRAMUSCULAR; SOFT TISSUE ONCE
Status: CANCELLED | OUTPATIENT
Start: 2022-03-18 | End: 2022-03-18

## 2022-03-18 RX ORDER — BETAMETHASONE SODIUM PHOSPHATE AND BETAMETHASONE ACETATE 3; 3 MG/ML; MG/ML
3 INJECTION, SUSPENSION INTRA-ARTICULAR; INTRALESIONAL; INTRAMUSCULAR; SOFT TISSUE ONCE
Status: COMPLETED | OUTPATIENT
Start: 2022-03-18 | End: 2022-03-18

## 2022-03-18 RX ADMIN — BETAMETHASONE SODIUM PHOSPHATE AND BETAMETHASONE ACETATE 3 MG: 3; 3 INJECTION, SUSPENSION INTRA-ARTICULAR; INTRALESIONAL; INTRAMUSCULAR; SOFT TISSUE at 09:45

## 2022-03-18 NOTE — PROGRESS NOTES
Patient: Stephen Peterson                MRN: [id number]       SSN: [id number]  YOB: 1950        AGE: 73 y.o. SEX: male    PCP: Luciana Hazel MD  03/18/22    CC: LEFT KNEE AND RIGHT HIP PAIN    HISTORY:  Stephen Peterson is a 73 y.o. male who is seen for left knee and right hip pain. He completed an Euflexxa series on 11/1/19 but his pains have returned. He has been experiencing knee pain for the past several years. His 69 yo brother told him he needed to go back to the car or he'd be in a wheelchair when he took him to his colonoscopy. He states that his left knee will sometimes give out. He notes pain with standing, walking, and stair climbing. He experiences startup pain after sitting. He says his quality of life isn't great right now due to his pains. He is also seen for right hip and back pain. He reports increased low back and right hip pain recently. He used Salonpas on his right hip 1 month ago and developed a reaction to it causing dark discoloration. He is being seen by Dr. Emliy Beth for his low back pain. He notes a sharp pain radiating from his left hip all the way down his leg. He reports that he injured his hip in 2010 when he fell. He reports that his left knee pain is a result of a service related injuries. He states he has completed several courses of outpatient PT at the [facility], most recently on 10/30/17 with little relief. He was previously seen for right shoulder and neck pain. He reports pain when reaching overhead with his right shoulder. He had a fall in 2009 and has had winging of the scapula since. He is s/p cervical fusion c3-c5 in 2010 with some residual nerve damage due to the fall.      Pain Assessment  3/18/2022   Location of Pain Knee   Pain Location Comment -   Location Modifiers Left   Severity of Pain 8   Quality of Pain Sharp   Quality of Pain Comment -   Duration of Pain Persistent   Frequency of Pain Constant   Aggravating Factors Bending;Walking;Standing   Aggravating Factors Comment -   Limiting Behavior -   Relieving Factors -   Relieving Factors Comment -   Result of Injury -   Work-Related Injury -   Type of Injury -   Type of Injury Comment -     Occupation, etc:  Mr. Casa Goode retired from [employer] in 2005 after 24 years in [employer]. He works at [employer]. 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 He lives in [place] with his wife. He has 1 adult son living in [place] and 2 adult step children. He states he lost 30 pounds this year. Current weight is 169 pounds. He is 6' tall. He is not hypertensive or diabetic.      No results found for: HBA1C, ILW7RBGJ, MAY3ORIM, TNL0YBGE  Weight Metrics 11/1/2019 10/25/2019 10/18/2019 9/20/2019 7/27/2018 7/20/2018 7/13/2018   Weight 169 lb 6.4 oz 168 lb 166 lb 3.2 oz 163 lb 167 lb 6.4 oz 163 lb 6.4 oz 162 lb   BMI 22.97 kg/m2 22.78 kg/m2 22.54 kg/m2 22.11 kg/m2 22.7 kg/m2 22.16 kg/m2 21.97 kg/m2       Patient Active Problem List   Diagnosis Code    Primary localized osteoarthrosis, lower leg M17.10    Osteoarthritis, knee M17.10    Neck pain, acute M54.2    Postlaminectomy syndrome, cervical M96.1    Lumbago M54.50    Lumbosacral neuritis M54.17    Cervicalgia M54.2    Postlaminectomy syndrome, not elsewhere classified M96.1    Low back pain M54.50    Radiculopathy, lumbosacral region M54.17    Chronic right shoulder pain M25.511, G89.29    DDD (degenerative disc disease), cervical M50.30    Degenerative tear of glenoid labrum of right shoulder M24.111    Low back pain without sciatica M54.50    Spondylosis of lumbar region without myelopathy or radiculopathy M47.816    Lumbar neuritis M54.16    DDD (degenerative disc disease), lumbar M51.36    Lumbar discitis M46.46    Other cervical disc degeneration, unspecified cervical region M50.30    Postlaminectomy syndrome M96.1    Other articular cartilage disorders, right shoulder M24.111    Spondylolisthesis, acquired M43.10    Bulging lumbar disc M51.26    Lumbar spinal stenosis M48.061    Tear of meniscus of left knee S83.207A    Other intervertebral disc degeneration, lumbar region M51.36    Other intervertebral disc displacement, lumbar region M51.26    Cervical postlaminectomy syndrome M96.1    Cervical neuritis M54.12    Lumbar herniated disc M51.26     REVIEW OF SYSTEMS: All Below are Negative except: See HPI   Constitutional: negative for fever, chills, and weight loss. Cardiovascular: negative for chest pain, claudication, leg swelling, SOB, GATES   Gastrointestinal: Negative for pain, N/V/C/D, Blood in stool or urine, dysuria,  hematuria, incontinence, pelvic pain. Musculoskeletal: See HPI   Neurological: Negative for dizziness and weakness. Negative for headaches, Visual changes, confusion, seizures   Phychiatric/Behavioral: Negative for depression, memory loss, substance  abuse. Extremities: Negative for hair changes, rash, or skin lesion changes. Hematologic: Negative for bleeding problems, bruising, pallor or swollen lymph  nodes   Peripheral Vascular: No calf pain, no circulation deficits. Social History     Socioeconomic History    Marital status:      Spouse name: Not on file    Number of children: Not on file    Years of education: Not on file    Highest education level: Not on file   Occupational History    Occupation: Retired   Tobacco Use    Smoking status: Current Every Day Smoker     Packs/day: 0.25     Years: 13.00     Pack years: 3.25     Types: Cigarettes    Smokeless tobacco: Never Used    Tobacco comment: 9 cigarettes per day   Substance and Sexual Activity    Alcohol use:  Yes    Drug use: Not on file    Sexual activity: Not Currently     Partners: Female   Other Topics Concern    Not on file   Social History Narrative    Not on file     Social Determinants of Health     Financial Resource Strain:     Difficulty of Paying Living Expenses: Not on file   Food Insecurity:     Worried About Running Out of Food in the Last Year: Not on file    Pola of Food in the Last Year: Not on file   Transportation Needs:     Lack of Transportation (Medical): Not on file    Lack of Transportation (Non-Medical): Not on file   Physical Activity:     Days of Exercise per Week: Not on file    Minutes of Exercise per Session: Not on file   Stress:     Feeling of Stress : Not on file   Social Connections:     Frequency of Communication with Friends and Family: Not on file    Frequency of Social Gatherings with Friends and Family: Not on file    Attends Anabaptism Services: Not on file    Active Member of [address] [place] [address] [employer] or Organizations: Not on file    Attends Club or Organization Meetings: Not on file    Marital Status: Not on file   Intimate Partner Violence:     Fear of Current or Ex-Partner: Not on file    Emotionally Abused: Not on file    Physically Abused: Not on file    Sexually Abused: Not on file   Housing Stability:     Unable to Pay for Housing in the Last Year: Not on file    Number of [place] in the Last Year: Not on file    Unstable Housing in the Last Year: Not on file      No Known Allergies   Current Outpatient Medications   Medication Sig    gabapentin (NEURONTIN) 300 mg capsule Take 300 mg by mouth three (3) times daily.  gabapentin (NEURONTIN) 300 mg capsule Take 300 mg by mouth three (3) times daily.  cholecalciferol (VITAMIN D3) 1,000 unit cap Take  by mouth daily.  acetaminophen (TYLENOL ARTHRITIS PAIN) 650 mg CR tablet Take 650 mg by mouth every six (6) hours as needed for Pain.  Omeprazole delayed release (PRILOSEC D/R) 20 mg tablet 20 mg.    baclofen (LIORESAL) 10 mg tablet Take 20 mg by mouth three (3) times daily.       aspirin delayed-release 81 mg tablet Take  by mouth daily.      lisinopril-hydrochlorothiazide (PRINZIDE, ZESTORETIC) 20-12.5 mg per tablet Take  by mouth daily. No current facility-administered medications for this visit.       PHYSICAL EXAMINATION:  Visit Vitals  Pulse (!) 102   Temp 97.3 °F (36.3 °C) (Temporal)   SpO2 96%      ORTHO EXAMINATION:  Examination Right knee Left knee   Skin Intact Intact   Range of motion 115-0 100-5   Effusion - -   Medial joint line tenderness - +   Lateral joint line tenderness - -   Popliteal tenderness - -   Osteophytes palpable - +, medial and lateral   Nazarios - -   Patella crepitus - -   Anterior drawer - -   Lateral laxity - -   Medial laxity - -   Varus deformity - -   Valgus deformity - -   Pretibial edema - -   Calf tenderness - -   Ambulates using a tri-pod cane in his left hand  Walking forward flexed at waist    Examination Right hip Left hip   Skin Intact, healing burn from heating pad  Intact   External Rotation ROM 20 20   Internal Rotation ROM 10 10   Trochanteric tenderness - -   Hip flexion contracture - -   Antalgic gait - -   Trendelenberg sign - -   Lumbar tenderness - -   Straight leg raise - -   Calf tenderness - -   Neurovascular Intact Intact      Chart reviewed for the following:   I, Keysha Vee MD, have reviewed the History, Physical and updated the Allergic reactions for [address] performed immediately prior to start of procedure:  Lucius Schwab, MD, have performed the following reviews on [place] [address] prior to the start of the procedure:            * Patient was identified by name and date of birth   * Agreement on procedure being performed was verified  * Risks and Benefits explained to the patient  * Procedure site verified and marked as necessary  * Patient was positioned for comfort  * Consent was obtained     Time: 9:36 AM     Date of procedure: 3/18/2022    Procedure performed by:  Keysha Vee MD    Mr. Isreal Phillips tolerated the procedure well with no complications. MRI LEFT KNEE WO CONT 5/11/17  IMPRESSION:   1. Edema and fluid within ACL tendon fibers, likely ganglion cyst versus low-grade partial tear. Other major ligaments appear intact.   -Lateral meniscus possible posterior horn tear. No definite medial meniscus tear. 2. Patchy high-grade cartilage loss in the patellofemoral and medial compartments, grade 3-4. 3. Edema in the suprapatellar fat pad may be seen with quadriceps fat pad impingement. Trace joint effusion. Minimal Hoffa's fat pad edema.   -Small amount of fluid surrounding the semimembranosus tendon, possibly bursitis or tracking popliteal cyst.     RADIOGRAPHS:  XR LEFT KNEE 3/18/22 [facility]  IMPRESSION:  Three views with bilateral knees on AP view - No fractures,  effusion, moderately severe joint space narrowing, + osteophytes present. Kellgren Stan grade 3. XR RIGHT HIP 12/1/17  IMPRESSION:  AP pelvis and two views - No fractures, no joint space narrowing, no osteophytes present. XR LEFT KNEE 11/3/17  IMPRESSION:  Three views - No fractures, no effusion, L>R medial joint space narrowing, + medial and lateral osteophytes present. Moderately severe L, moderate R    XR RIGHT SHOULDER 6/30/11 [facility]  IMPRESSION:  Three views - No fractures, mild acromioclavicular narrowing, no glenohumeral narrowing, no calcific densities, cystic changes    IMPRESSION:      ICD-10-CM ICD-9-CM    1. Primary osteoarthritis of left knee  M17.12 715.16 AMB POC X-RAY KNEE 3 VIEW      betamethasone (CELESTONE) injection 3 mg      DRAIN/INJECT LARGE JOINT/BURSA      REFERRAL TO PHYSICAL THERAPY      PROCEDURE AUTHORIZATION TO    2. Chronic pain of left knee  M25.562 719.46 AMB POC X-RAY KNEE 3 VIEW    G89.29 338.29 REFERRAL TO PHYSICAL THERAPY      PROCEDURE AUTHORIZATION TO    3.  Trochanteric bursitis, right hip  M70.61 726.5 DRAIN/INJECT LARGE JOINT/BURSA      betamethasone (CELESTONE) injection 3 mg      REFERRAL TO PHYSICAL THERAPY   4. Chronic pain of right hip  M25.551 719.45 DRAIN/INJECT LARGE JOINT/BURSA    G89.29 338.29 betamethasone (CELESTONE) injection 3 mg      REFERRAL TO PHYSICAL THERAPY   5. Lumbar pain  M54.50 724.2 REFERRAL TO SPINE SURGERY      REFERRAL TO PHYSICAL THERAPY     PLAN: Consider visco supplementation if pain continues. He will start a brief course of outpatient aqua physical therapy. After discussing treatment options, patient's right lateral hip and left knee were injected with 4 cc Marcaine and 1/2 cc Celestone. There is no need for surgery at this time. He will follow up at the spine center. He will follow up as needed.      Scribed by Bill Weir MD  [address]) as dictated by Bill Weir MD

## 2022-03-25 ENCOUNTER — PATIENT MESSAGE (OUTPATIENT)
Dept: ORTHOPEDIC SURGERY | Age: 72
End: 2022-03-25

## 2022-03-29 NOTE — TELEPHONE ENCOUNTER
From: Severino Ga  To: Pita Gunn MD  Sent: 3/25/2022 5:25 PM EDT  Subject: Got a voice mail message     The referral that office sent for aquatic physical therapy must have been sent to the wrong place. They [facility]'t [facility] aquatic at [facility]. There phone number is .

## 2022-04-08 ENCOUNTER — HOSPITAL ENCOUNTER (OUTPATIENT)
Dept: PHYSICAL THERAPY | Age: 72
Discharge: HOME | End: 2022-04-08
Attending: SPECIALIST
Payer: MEDICARE

## 2022-04-08 PROCEDURE — 97110 THERAPEUTIC EXERCISES: CPT

## 2022-04-08 PROCEDURE — 97162 PT EVAL MOD COMPLEX 30 MIN: CPT

## 2022-04-08 NOTE — PROGRESS NOTES
[facility]  [address] [facility] [address]  [place], [address]  [phone] fax    Plan of Care/ Statement of Necessity for Physical Therapy Services    Patient name: Gabo Castro Start of Care: 2022   Referral source: Lucretia Genao MD : 1950    Medical Diagnosis: Unilateral primary osteoarthritis, left knee [M17.12]  Pain in left knee [M25.562]  Other chronic pain [G89.29]  Trochanteric bursitis, right hip [M70.61]  Pain in right hip [M25.551]  Low back pain, unspecified [M54.50]  Payor: VA MEDICARE / Plan: VA MEDICARE PART A & B / Product Type: Medicare /  Onset Date:    Treatment Diagnosis: LBP, right hip pain, left knee pain, difficulty walking   Prior Hospitalization: see medical history Provider#: [id number]   Medications: Verified on Patient summary List    Comorbidities: OA, fibromyalgia, HTN, +tobacco use, right shoulder pain/dislocation, LBP   Prior Level of Function: functionally I with ADLs, cane/walking stick with gait      The Plan of Care and following information is based on the information from the initial evaluation. Assessment/ key information: 74 y/o male presents with c/o LBP, right hip pain and left knee and difficulty walking. The pt presents with unsteady gait with rotated body and limited foot clearance with use of walking stick. The pt reports pain initially started years ago in the [employer] with no specific injury. He reports most of his issues started after falling while doing yard work in . Since that time he reports gradual worsening of symptoms. The pt demonstrates flexed trunk, +scoliosis, right scapular winging. Decreased lumbar AROM with c/o pain, limited B hip mobility and ROM with right hip pain, limited strength of B LEs, limited flexibility for B LEs. The pt will benefit from PT to address the aforementioned impairments.       Evaluation Complexity History HIGH Complexity :3+ comorbidities / personal factors will impact the outcome/ POC ; Examination MEDIUM Complexity : 3 Standardized tests and measures addressing body structure, function, activity limitation and / or participation in recreation  ;Presentation MEDIUM Complexity : Evolving with changing characteristics  ; Clinical Decision Making MEDIUM Complexity : FOTO score of 26-74  Overall Complexity Rating: MEDIUM  Problem List: pain affecting function, decrease ROM, decrease strength, impaired gait/ balance, decrease ADL/ functional abilitiies, decrease activity tolerance and decrease flexibility/ joint mobility   Treatment Plan may include any combination of the following: Therapeutic exercise, Therapeutic activities, Neuromuscular re-education, Physical agent/modality, Gait/balance training, Manual therapy, Aquatic therapy, Patient education and Self Care training  Patient / Family readiness to learn indicated by: asking questions and trying to perform skills  Persons(s) to be included in education: patient (P)  Barriers to Learning/Limitations: None  Patient Goal (s): To improve walking and some level of fitness  Patient Self Reported Health Status: fair  Rehabilitation Potential: fair    Short Term Goals: To be accomplished in 1 weeks:   1. The pt will be I and compliant with HEP     Long Term Goals: To be accomplished in 4 weeks:   1. Improve FOTO to the predicted outcome for improved ability for functional tasks. 2. The pt will demonstrate 4/5 B glute med MMT for improved ability for functional tasks   3. The pt will report a little difficulty with walking 1 block. 4. The pt will report at least 50% improvement to improve tolerance to daily activities. Frequency / Duration: Patient to be seen 2 times per week for 4 weeks.     Patient/ Caregiver education and instruction: Diagnosis, prognosis, self care, activity modification and exercises   [x]  Plan of care has been reviewed with PTA    Certification Period: 4-8-22 to 5-7-22  George Greer oDmenico, PT 4/8/2022 7:39 AM    ________________________________________________________________________    I certify that the above Therapy Services are being furnished while the patient is under my care. I agree with the treatment plan and certify that this therapy is necessary.     [de-identified] Signature:____________________  Date:____________Time: _________     Nataliia Gloria MD  Please sign and return to [facility] [address]  [place], [facility] [address]  [phone] fax

## 2022-04-08 NOTE — PROGRESS NOTES
PT DAILY TREATMENT NOTE     Patient Name: Karlene Francois  Date:2022  : 1950  [x]  Patient  Verified  Payor: VA MEDICARE / Plan: VA MEDICARE PART A & B / Product Type: Medicare /    [id number]  Out time:08  Total Treatment Time (min): 40  Visit #: 1 of 8    Medicare/BCBS Only   Total Timed Codes (min):  15 1:1 Treatment Time:  40       Treatment Area: Unilateral primary osteoarthritis, left knee [M17.12]  Pain in left knee [M25.562]  Other chronic pain [G89.29]  Trochanteric bursitis, right hip [M70.61]  Pain in right hip [M25.551]  Low back pain, unspecified [M54.50]    SUBJECTIVE  Pain Level (0-10 scale): 7  Any medication changes, allergies to medications, adverse drug reactions, diagnosis change, or new procedure performed?: [x] No    [] Yes (see summary sheet for update)  Subjective functional status/changes:   [] No changes reported       OBJECTIVE    Modality rationale:    Min Type Additional Details    [] Estim:  []Unatt       []IFC  []Premod                        []Other:  []w/ice   []w/heat  Position:  Location:    [] Estim: []Att    []TENS instruct  []NMES                    []Other:  []w/US   []w/ice   []w/heat  Position:  Location:    []  Traction: [] Cervical       []Lumbar                       [] Prone          []Supine                       []Intermittent   []Continuous Lbs:  [] before manual  [] after manual    []  Ultrasound: []Continuous   [] Pulsed                           []1MHz   []3MHz W/cm2:  Location:    []  Iontophoresis with dexamethasone         Location: [] Take home patch   [] In clinic    []  Ice     []  heat  []  Ice massage  []  Laser   []  Anodyne Position:  Location:    []  Laser with stim  []  Other:  Position:  Location:    []  Vasopneumatic Device    []  Right     []  Left  Pre-treatment girth:  Post-treatment girth:  Measured at (location):  Pressure:       [] lo [] med [] hi   Temperature: [] lo [] med [] hi   [] Skin assessment post-treatment: []intact []redness- no adverse reaction    []redness - adverse reaction:     25 min [x]Eval                  []Re-Eval       15 min Therapeutic Exercise:  [] See flow sheet :HEP   Rationale: increase ROM and increase strength to improve the patients ability to perform ADL      With   [] TE   [] TA   [] neuro   [] other: Patient Education: [x] Review HEP    [] Progressed/Changed HEP based on:   [] positioning   [] body mechanics   [] transfers   [] heat/ice application    [] other:      Other Objective/Functional Measures:       Pain Level (0-10 scale) post treatment: 7    ASSESSMENT/Changes in Function:      Patient will continue to benefit from skilled PT services to modify and progress therapeutic interventions, address functional mobility deficits, address ROM deficits, address strength deficits, analyze and address soft tissue restrictions, analyze and cue movement patterns, analyze and modify body mechanics/ergonomics and assess and modify postural abnormalities to attain remaining goals. [x]  See Plan of Care  []  See progress note/recertification  []  See Discharge Summary         Progress towards goals / Updated goals:    Short Term Goals: To be accomplished in 1 weeks:   1. The pt will be I and compliant with HEP   IE- issued HEP  Long Term Goals: To be accomplished in 4 weeks:   1. Improve FOTO to the predicted outcome for improved ability for functional tasks. IE- 36   2. The pt will demonstrate 4/5 B glute med MMT for improved ability for functional tasks   IE- 3-/5   3. The pt will report a little difficulty with walking 1 block. IE- limited a lot   4. The pt will report at least 50% improvement to improve tolerance to daily activities.     IE- worsening     PLAN  []  Upgrade activities as tolerated     [x]  Continue plan of care  []  Update interventions per flow sheet       []  Discharge due to:_  []  Other:_      Karrie Barragan, PT 4/8/2022  7:38 AM    Future Appointments   Date Time Provider Shilo Cruzi   4/8/2022  7:45 AM Mikala Page, Nunu Bell, PT [id number]

## 2022-04-13 ENCOUNTER — HOSPITAL ENCOUNTER (OUTPATIENT)
Dept: PHYSICAL THERAPY | Age: 72
Discharge: HOME | End: 2022-04-13
Attending: SPECIALIST
Payer: MEDICARE

## 2022-04-13 PROCEDURE — 97110 THERAPEUTIC EXERCISES: CPT

## 2022-04-13 NOTE — PROGRESS NOTES
PT DAILY TREATMENT NOTE     Patient Name: Winston Sanford  Date:2022  : 1950  [x]  Patient  Verified    Payor: [place] MEDICARE / Plan: [place] MEDICARE PART A & B / Product Type: Medicare /    In time:10:39  Out time:11:22  Total Treatment Time (min): 43  Visit #: 2 of 8    Medicare/BCBS Only   Total Timed Codes (min):  43 1:1 Treatment Time:  37       Treatment Area: Unilateral primary osteoarthritis, left knee [M17.12]  Pain in left knee [M25.562]  Other chronic pain [G89.29]  Trochanteric bursitis, right hip [M70.61]  Pain in right hip [M25.551]  Low back pain, unspecified [M54.50]    SUBJECTIVE  Pain Level (0-10 scale): /10  Any medication changes, allergies to medications, adverse drug reactions, diagnosis change, or new procedure performed?: [x] No    [] Yes (see summary sheet for update)  Subjective functional status/changes:   [] No changes reported  Pt late for appointment. OBJECTIVE    43 min Therapeutic Exercise:  [x] See flow sheet :   Rationale: increase ROM and increase strength to improve the patients ability to perform ADL's. With   [x] TE   [] TA   [] neuro   [] other: Patient Education: [x] Review HEP    [] Progressed/Changed HEP based on:   [] positioning   [] body mechanics   [] transfers   [] heat/ice application    [] other:      Other Objective/Functional Measures: Initiated exercises per flow sheet. Pt reports equal pain between low back, Right hip and Left knee. Forward/rounded posture. Limited Right hip mobility. Pain Level (0-10 scale) post treatment: 5/10    ASSESSMENT/Changes in Function: First F/U visit. Pt fully participated in treatment and is motivated. Pt reported feeling a little better post-treatment. Visually appeared to stand more erect post-treatment. Possible right hip flex contracture. Continue PT to decrease mm/fascia impairments, increase stability/strength and improve posture to improve ease of performing functional activities.     Patient will continue to benefit from skilled PT services to modify and progress therapeutic interventions, address functional mobility deficits, address ROM deficits, address strength deficits, analyze and address soft tissue restrictions, analyze and cue movement patterns and analyze and modify body mechanics/ergonomics to attain remaining goals. [x]  See Plan of Care  []  See progress note/recertification  []  See Discharge Summary         Progress towards goals / Updated goals:  Short Term Goals: To be accomplished in 1 weeks:              1. The pt will be I and compliant with HEP              IE- issued HEP   Current: Partial HEP compliance. 4/13/2022  Long Term Goals: To be accomplished in 4 weeks:              1. Improve FOTO to the predicted outcome for improved ability for functional tasks. IE- 39              2. The pt will demonstrate 4/5 B glute med MMT for improved ability for functional tasks              IE- 3-/5              3. The pt will report a little difficulty with walking 1 block. IE- limited a lot              4. The pt will report at least 50% improvement to improve tolerance to daily activities.                       IE- worsening     PLAN  []  Upgrade activities as tolerated     [x]  Continue plan of care  []  Update interventions per flow sheet       []  Discharge due to:_  []  Other:_      Arvell Poli, PTA 4/13/2022  10:39 AM    Future Appointments   Date Time Provider Shilo Anderson   4/15/2022  9:15 AM Essie Kaufman, PT [facility]   4/19/2022  8:30 AM Patricia Villalobos, PTA [facility]   4/22/2022  7:00 AM Michelle Abad, PTA [facility]   4/26/2022  7:45 AM Patricia Villalobos, PTA [facility]   4/29/2022  7:00 AM Essie Kaufman, PT [facility]   5/3/2022  7:45 AM Liz Breath Nova Lockdany, PT [facility]

## 2022-04-15 ENCOUNTER — HOSPITAL ENCOUNTER (OUTPATIENT)
Dept: PHYSICAL THERAPY | Age: 72
Discharge: HOME | End: 2022-04-15
Attending: SPECIALIST
Payer: MEDICARE

## 2022-04-15 PROCEDURE — 97110 THERAPEUTIC EXERCISES: CPT

## 2022-04-15 PROCEDURE — 97112 NEUROMUSCULAR REEDUCATION: CPT

## 2022-04-15 NOTE — PROGRESS NOTES
PT DAILY TREATMENT NOTE     Patient Name: Andrae Phillips  Date:4/15/2022  : 1950  [x]  Patient  Verified  Payor: [place] MEDICARE / Plan: VA MEDICARE PART A & B / Product Type: Medicare /    In PENS:[id number]  Out time:1009  Total Treatment Time (min): 52  Visit #: 3 of 8    Medicare/BCBS Only   Total Timed Codes (min):  49 1:1 Treatment Time:  52       Treatment Area: Unilateral primary osteoarthritis, left knee [M17.12]  Pain in left knee [M25.562]  Other chronic pain [G89.29]  Trochanteric bursitis, right hip [M70.61]  Pain in right hip [M25.551]  Low back pain, unspecified [M54.50]    SUBJECTIVE  Pain Level (0-10 scale): 7  Any medication changes, allergies to medications, adverse drug reactions, diagnosis change, or new procedure performed?: [x] No    [] Yes (see summary sheet for update)  Subjective functional status/changes:   [] No changes reported  The pt reports he did okay after last session. He reports he will be going to the chiropractor today. OBJECTIVE      41 min Therapeutic Exercise:  [x] See flow sheet :   Rationale: increase ROM and increase strength to improve the patients ability to perform ADL      8 min Neuromuscular Re-education:  [x]  See flow sheet : romberg, march, fall outs   Rationale: increase strength, improve balance and increase proprioception  to improve the patients ability to perform functional tasks. With   [] TE   [] TA   [] neuro   [] other: Patient Education: [x] Review HEP    [] Progressed/Changed HEP based on:   [] positioning   [] body mechanics   [] transfers   [] heat/ice application    [] other:      Other Objective/Functional Measures: added sit to stands, clams, bent knee fall outs      Pain Level (0-10 scale) post treatment: 6    ASSESSMENT/Changes in Function: limited left hip mobility is noted with therex. Good effort noted with treatment. Decreased pain noted post treatment. + cues with therex.      Patient will continue to benefit from skilled PT services to modify and progress therapeutic interventions, address functional mobility deficits, address ROM deficits, address strength deficits, analyze and address soft tissue restrictions, analyze and cue movement patterns, analyze and modify body mechanics/ergonomics and assess and modify postural abnormalities to attain remaining goals. []  See Plan of Care  []  See progress note/recertification  []  See Discharge Summary         Progress towards goals / Updated goals:  Short Term Goals: To be accomplished in 1 weeks:              3. The pt will be I and compliant with HEP              IE- issued HEP              Current: Partial HEP compliance. 4/13/2022  Long Term Goals: To be accomplished in 4 weeks:              7. Improve FOTO to the predicted outcome for improved ability for functional tasks.               KV- 36              2. The pt will demonstrate 4/5 B glute med MMT for improved ability for functional tasks              LH- 3-/5              3. The pt will report a little difficulty with walking 1 block.              IE- limited a lot              4. The pt will report at least 50% improvement to improve tolerance to daily activities.                       TH- worsening    Current: limited change 4-15-22    PLAN  []  Upgrade activities as tolerated     [x]  Continue plan of care  []  Update interventions per flow sheet       []  Discharge due to:_  []  Other:_      Marjorie Das, PT 4/15/2022  9:23 AM    Future Appointments   Date Time Provider Shilo Anderson   4/19/2022  8:30 AM Austyn Yee, PTA [facility]   4/22/2022  7:00 AM Saeed Soliman, PTA [facility]   4/26/2022  7:45 AM Austyn Yee, PTA [facility]   4/29/2022  7:00 AM Rajwinder Muse, PT [facility]   5/3/2022  7:45 AM Jose Nesbitt, PT [facility]

## 2022-04-19 ENCOUNTER — HOSPITAL ENCOUNTER (OUTPATIENT)
Dept: PHYSICAL THERAPY | Age: 72
Discharge: HOME | End: 2022-04-19
Attending: SPECIALIST
Payer: MEDICARE

## 2022-04-19 PROCEDURE — 97110 THERAPEUTIC EXERCISES: CPT

## 2022-04-19 PROCEDURE — 97112 NEUROMUSCULAR REEDUCATION: CPT

## 2022-04-19 NOTE — PROGRESS NOTES
PT DAILY TREATMENT NOTE     Patient Name: Tessa Palma  Date:2022  : 1950  [x]  Patient  Verified  Payor: VA MEDICARE / Plan: VA MEDICARE PART A & B / Product Type: Medicare /    In time:8:30  Out time:9:10  Total Treatment Time (min): 40  Visit #: 4 of 8    Medicare/BCBS Only   Total Timed Codes (min):  40 1:1 Treatment Time:  40       Treatment Area: Unilateral primary osteoarthritis, left knee [M17.12]  Pain in left knee [M25.562]  Other chronic pain [G89.29]  Trochanteric bursitis, right hip [M70.61]  Pain in right hip [M25.551]  Low back pain, unspecified [M54.50]    SUBJECTIVE  Pain Level (0-10 scale): 7/10  Any medication changes, allergies to medications, adverse drug reactions, diagnosis change, or new procedure performed?: [x] No    [] Yes (see summary sheet for update)  Subjective functional status/changes:   [] No changes reported  Pt reports no new complaints of pain. Pt reports compliance with HEP. OBJECTIVE    30 min Therapeutic Exercise:  [x] See flow sheet :   Rationale: increase ROM and increase strength to improve the patients ability to perform ADLs with increased ease. 10 min Neuromuscular Re-education:  [x]  See flow sheet :   Rationale: increase strength, improve coordination and increase proprioception  to improve the patients ability to perform ADLs with increased ease. With   [] TE   [] TA   [] neuro   [] other: Patient Education: [x] Review HEP    [] Progressed/Changed HEP based on:   [] positioning   [] body mechanics   [] transfers   [] heat/ice application    [] other:      Other Objective/Functional Measures: Pt demonstrates poor posture and gait mechanics when ambulating within treatment area. Pain Level (0-10 scale) post treatment: 7/10    ASSESSMENT/Changes in Function: Pt demonstrates poor postural awareness and safety awareness when ambulating within treatment area.      Patient will continue to benefit from skilled PT services to modify and progress therapeutic interventions, address functional mobility deficits, address ROM deficits, address strength deficits, analyze and address soft tissue restrictions, analyze and cue movement patterns, analyze and modify body mechanics/ergonomics and assess and modify postural abnormalities to attain remaining goals. []  See Plan of Care  []  See progress note/recertification  []  See Discharge Summary         Progress towards goals / Updated goals:  Short Term Goals: To be accomplished in 1 weeks:              3. The pt will be I and compliant with HEP              IE- issued HEP              EXONECB: Partial HEP compliance. 4/13/2022  Long Term Goals: To be accomplished in 4 weeks:              6. Improve FOTO to the predicted outcome for improved ability for functional tasks.               WJ- 36              2. The pt will demonstrate 4/5 B glute med MMT for improved ability for functional tasks              LZ- 3-/5              3. The pt will report a little difficulty with walking 1 block.              IE- limited a lot              4. The pt will report at least 50% improvement to improve tolerance to daily activities.                       WA- worsening               Current: limited change 4-15-22       PLAN  []  Upgrade activities as tolerated     []  Continue plan of care  []  Update interventions per flow sheet       []  Discharge due to:_  []  Other:_      Selma Proper, PTA 4/19/2022  8:35 AM    Future Appointments   Date Time Provider Shilo Anderson   4/22/2022  7:00 AM [facility]   4/26/2022  7:45 AM Mariangel Morrell, PTA [facility]   4/29/2022  7:00 AM Jeffrey Oneill, PT [facility]   5/3/2022  7:45 AM Vashti Leggett, PT [facility]

## 2022-04-21 ENCOUNTER — APPOINTMENT (OUTPATIENT)
Dept: PHYSICAL THERAPY | Age: 72
End: 2022-04-21
Attending: SPECIALIST
Payer: MEDICARE

## 2022-04-22 ENCOUNTER — HOSPITAL ENCOUNTER (OUTPATIENT)
Dept: PHYSICAL THERAPY | Age: 72
Discharge: HOME | End: 2022-04-22
Attending: SPECIALIST
Payer: MEDICARE

## 2022-04-22 PROCEDURE — 97112 NEUROMUSCULAR REEDUCATION: CPT

## 2022-04-22 PROCEDURE — 97110 THERAPEUTIC EXERCISES: CPT

## 2022-04-22 NOTE — PROGRESS NOTES
PT DAILY TREATMENT NOTE     Patient Name: Irma Al  Date:2022  : 1950  [x]  Patient  Verified  Payor: VA MEDICARE / Plan: VA MEDICARE PART A & B / Product Type: Medicare /    In time:7:00  Out time:7:49  Total Treatment Time (min): 49  Visit #: 5 of 8    Medicare/BCBS Only   Total Timed Codes (min):  49 1:1 Treatment Time:  52       Treatment Area: Unilateral primary osteoarthritis, left knee [M17.12]  Pain in left knee [M25.562]  Other chronic pain [G89.29]  Trochanteric bursitis, right hip [M70.61]  Pain in right hip [M25.551]  Low back pain, unspecified [M54.50]    SUBJECTIVE  Pain Level (0-10 scale): 7/10  Any medication changes, allergies to medications, adverse drug reactions, diagnosis change, or new procedure performed?: [x] No    [] Yes (see summary sheet for update)  Subjective functional status/changes:   [] No changes reported  \"Tired this morning. Pain is the same. \"    OBJECTIVE    39 min Therapeutic Exercise:  [x] See flow sheet :   Rationale: increase ROM and increase strength to improve the patients ability to perform ADL's. 10 min Neuromuscular Re-education:  [x]  See flow sheet : Marching in // bars, romberg 30\"x2, bridges. Rationale: increase strength, improve coordination, improve balance and increase proprioception  to improve the patients ability to perform functional activities. With   [x] TE   [] TA   [] neuro   [] other: Patient Education: [x] Review HEP    [] Progressed/Changed HEP based on:   [] positioning   [] body mechanics   [] transfers   [] heat/ice application    [] other:      Other Objective/Functional Measures: Added 1/2 prone hip extension, LTR and bridges to increase core/hip strength and trunk mobility. Pain Level (0-10 scale) post treatment: 6/10    ASSESSMENT/Changes in Function: Pt reports \"limited a lot\" difficulty walking 1 block. Able to stand more erect today with Romberg stance however fatigues quickly.  Reports low back and Right hip pain increases with prolonged standing/walking. Challenged with bridges, performed ~50% ROM and low back pain increased. Continue PT to increase hip/back mobility, increase strength/stability to improve ease of performing functional activities and walking. Patient will continue to benefit from skilled PT services to modify and progress therapeutic interventions, address functional mobility deficits, address ROM deficits, address strength deficits, analyze and address soft tissue restrictions, analyze and cue movement patterns, analyze and modify body mechanics/ergonomics and address imbalance/dizziness to attain remaining goals. [x]  See Plan of Care  []  See progress note/recertification  []  See Discharge Summary         Progress towards goals / Updated goals:  Short Term Goals: To be accomplished in 1 weeks:              4. The pt will be I and compliant with HEP              IE- issued HEP              FRIWPHO: Partial HEP compliance. 4/13/2022  Long Term Goals: To be accomplished in 4 weeks:              5. Improve FOTO to the predicted outcome for improved ability for functional tasks.               QO- 36              2. The pt will demonstrate 4/5 B glute med MMT for improved ability for functional tasks              FD- 3-/5              3. The pt will report a little difficulty with walking 1 block.              IE- limited a lot   Current: No change, limited a lot. 4/22/2022              4. The pt will report at least 50% improvement to improve tolerance to daily activities.                       DM- worsening               UOUMKFV: limited change 4-15-22    PLAN  []  Upgrade activities as tolerated     [x]  Continue plan of care  []  Update interventions per flow sheet       []  Discharge due to:_  []  Other:_      Jaylene Chapa PTA 4/22/2022  7:04 AM    Future Appointments   Date Time Provider Shilo Anderson   4/26/2022  7:45 [facility] 4/29/2022  7:00 AM Yo Nunez, PT [facility]   5/3/2022  7:45 AM Rainey Claude [facility]oon[facility]

## 2022-04-26 ENCOUNTER — HOSPITAL ENCOUNTER (OUTPATIENT)
Dept: PHYSICAL THERAPY | Age: 72
Discharge: HOME | End: 2022-04-26
Attending: SPECIALIST
Payer: MEDICARE

## 2022-04-26 PROCEDURE — 97110 THERAPEUTIC EXERCISES: CPT

## 2022-04-26 PROCEDURE — 97112 NEUROMUSCULAR REEDUCATION: CPT

## 2022-04-26 NOTE — PROGRESS NOTES
PT DAILY TREATMENT NOTE     Patient Name: Donna Robles  Date:2022  : 1950  [x]  Patient  Verified  Payor: VA MEDICARE / Plan: VA MEDICARE PART A & B / Product Type: Medicare /    In time:7:45  Out time:8:26  Total Treatment Time (min): 41  Visit #: 6 of 8    Medicare/BCBS Only   Total Timed Codes (min):  41 1:1 Treatment Time:  41       Treatment Area: Unilateral primary osteoarthritis, left knee [M17.12]  Pain in left knee [M25.562]  Other chronic pain [G89.29]  Trochanteric bursitis, right hip [M70.61]  Pain in right hip [M25.551]  Low back pain, unspecified [M54.50]    SUBJECTIVE  Pain Level (0-10 scale): 7/10  Any medication changes, allergies to medications, adverse drug reactions, diagnosis change, or new procedure performed?: [x] No    [] Yes (see summary sheet for update)  Subjective functional status/changes:   [] No changes reported  Pt reports he always has pain. Pt reports compliance with HEP. OBJECTIVE     31 min Therapeutic Exercise:  [x] See flow sheet :   Rationale: increase ROM and increase strength to improve the patients ability to perform ADLs with increased ease. 10 min Neuromuscular Re-education:  [x]  See flow sheet :   Rationale: increase strength, improve coordination and increase proprioception  to improve the patients ability to perform ADLs with increased ease. With   [] TE   [] TA   [] neuro   [] other: Patient Education: [x] Review HEP    [] Progressed/Changed HEP based on:   [] positioning   [] body mechanics   [] transfers   [] heat/ice application    [] other:      Other Objective/Functional Measures: Pt continues to have poor standing posture. Pain Level (0-10 scale) post treatment: 6/10    ASSESSMENT/Changes in Function: Pt continues to ambulate with poor gait mechanics. Pt demonstrates improved static standing balance but continues to have difficulty standing upright.      Patient will continue to benefit from skilled PT services to modify and progress therapeutic interventions, address functional mobility deficits, address ROM deficits, address strength deficits, analyze and address soft tissue restrictions, analyze and cue movement patterns, analyze and modify body mechanics/ergonomics and assess and modify postural abnormalities to attain remaining goals. []  See Plan of Care  []  See progress note/recertification  []  See Discharge Summary         Progress towards goals / Updated goals:  Short Term Goals: To be accomplished in 1 weeks:              7. The pt will be I and compliant with HEP              IE- issued HEP              ASFXFJJ: Partial HEP compliance. 4/13/2022  Long Term Goals: To be accomplished in 4 weeks:              0. Improve FOTO to the predicted outcome for improved ability for functional tasks.               VO- 36              2. The pt will demonstrate 4/5 B glute med MMT for improved ability for functional tasks              QT- 3-/5              3. The pt will report a little difficulty with walking 1 block.              IE- limited a lot              Current: No change, limited a lot. 4/22/2022              4. The pt will report at least 50% improvement to improve tolerance to daily activities.                       FG- worsening               ISVIXLF: limited change 4-15-22       PLAN  []  Upgrade activities as tolerated     [x]  Continue plan of care  []  Update interventions per flow sheet       []  Discharge due to:_  []  Other:_      Rajiv Contreras, PTA 4/26/2022  7:55 AM    Future Appointments   Date Time Provider Shilo Anderson   4/29/2022  7:00 AM Yo Nunez, PT [facility]   5/3/2022  7:45 AM Sarabjit Scott, PT [facility]

## 2022-04-28 ENCOUNTER — APPOINTMENT (OUTPATIENT)
Dept: PHYSICAL THERAPY | Age: 72
End: 2022-04-28
Attending: SPECIALIST
Payer: MEDICARE

## 2022-04-29 ENCOUNTER — HOSPITAL ENCOUNTER (OUTPATIENT)
Dept: PHYSICAL THERAPY | Age: 72
Discharge: HOME | End: 2022-04-29
Attending: SPECIALIST
Payer: MEDICARE

## 2022-04-29 PROCEDURE — 97110 THERAPEUTIC EXERCISES: CPT

## 2022-04-29 PROCEDURE — 97112 NEUROMUSCULAR REEDUCATION: CPT

## 2022-04-29 NOTE — PROGRESS NOTES
PT DAILY TREATMENT NOTE     Patient Name: Leon Cea  Date:2022  : 1950  [x]  Patient  Verified  Payor: VA MEDICARE / Plan: VA MEDICARE PART A & B / Product Type: Medicare /    In WQXV:[id number]  Out time:0745  Total Treatment Time (min): 46  Visit #: 7 of 8    Medicare/BCBS Only   Total Timed Codes (min):  46 1:1 Treatment Time:  55       Treatment Area: Unilateral primary osteoarthritis, left knee [M17.12]  Pain in left knee [M25.562]  Other chronic pain [G89.29]  Trochanteric bursitis, right hip [M70.61]  Pain in right hip [M25.551]  Low back pain, unspecified [M54.50]    SUBJECTIVE  Pain Level (0-10 scale): 7  Any medication changes, allergies to medications, adverse drug reactions, diagnosis change, or new procedure performed?: [x] No    [] Yes (see summary sheet for update)  Subjective functional status/changes:   [] No changes reported  The pt reports he is feeling pain and the cold seems to be contributing today. OBJECTIVE      31 min Therapeutic Exercise:  [x] See flow sheet :   Rationale: increase ROM and increase strength to improve the patients ability to perform ADL       15 min Neuromuscular Re-education:  [x]  See flow sheet :   Rationale: increase strength, improve coordination, improve balance and increase proprioception  to improve the patients ability to perform ADL            With   [] TE   [] TA   [] neuro   [] other: Patient Education: [x] Review HEP    [] Progressed/Changed HEP based on:   [] positioning   [] body mechanics   [] transfers   [] heat/ice application    [] other:      Other Objective/Functional Measures: increased reps per flow sheet     Pain Level (0-10 scale) post treatment: 6    ASSESSMENT/Changes in Function: The pt demonstrates decreased pain with following treatment. Anticipate discharge following next session.      Patient will continue to benefit from skilled PT services to modify and progress therapeutic interventions, address functional mobility deficits, address strength deficits, analyze and cue movement patterns, assess and modify postural abnormalities and address imbalance/dizziness to attain remaining goals. []  See Plan of Care  []  See progress note/recertification  []  See Discharge Summary         Progress towards goals / Updated goals:  Short Term Goals: To be accomplished in 1 weeks:              7. The pt will be I and compliant with HEP              IE- issued HEP               [id number]: Limited compliance 4-29-22  Long Term Goals: To be accomplished in 4 weeks:              1. Improve FOTO to the predicted outcome for improved ability for functional tasks.               CG- 36              2. The pt will demonstrate 4/5 B glute med MMT for improved ability for functional tasks              LO- 3-/5              3. The pt will report a little difficulty with walking 1 block.              IE- limited a lot              Current: No change, limited a lot. 4/22/2022              4. The pt will report at least 50% improvement to improve tolerance to daily activities.                       MK- worsening               SRPMTDR: minor improvement 4-29-22    PLAN  []  Upgrade activities as tolerated     [x]  Continue plan of care  []  Update interventions per flow sheet       []  Discharge due to:_  []  Other:_      Lilliam Braga, PT 4/29/2022  7:05 AM    Future Appointments   Date Time Provider Shilo Anderson   5/3/2022  7:45 AM [facility]

## 2022-05-03 ENCOUNTER — HOSPITAL ENCOUNTER (OUTPATIENT)
Dept: PHYSICAL THERAPY | Age: 72
Discharge: HOME | End: 2022-05-03
Attending: SPECIALIST
Payer: MEDICARE

## 2022-05-03 PROCEDURE — 97112 NEUROMUSCULAR REEDUCATION: CPT

## 2022-05-03 PROCEDURE — 97110 THERAPEUTIC EXERCISES: CPT

## 2022-05-03 NOTE — PROGRESS NOTES
Physical Therapy Discharge Instructions      [facility]  [address]  [place], [address]  [phone] fax    Patient: Lobo Jolley  : 1950      Continue Home Exercise Program 1-2 times per day for 2-3 weeks, then decrease to 3-5 times per week      Continue with    [x] Ice  as needed 2-3 times per day     [x] Heat           Follow up with MD:     [] Upon completion of therapy     [x] As needed      Recommendations:     [x]   Return to activity with home program    []   Return to activity with the following modifications:       []Post Rehab Program    []Join Independent aquatic program     []Return to/join local gym        Additional Comments:            Noman Costa, PT 5/3/2022 8:33 AM Yes

## 2022-05-03 NOTE — PROGRESS NOTES
PT DAILY TREATMENT NOTE     Patient Name: Karlene Francois  Date:5/3/2022  : 1950  [x]  Patient  Verified  Payor: VA MEDICARE / Plan: VA MEDICARE PART A & B / Product Type: Medicare /    In IGJZ:[id number]  Out RRYB:[id number]  Total Treatment Time (min): 50  Visit #: 8 of 8    Medicare/BCBS Only   Total Timed Codes (min):  50 1:1 Treatment Time:  39       Treatment Area: Unilateral primary osteoarthritis, left knee [M17.12]  Pain in left knee [M25.562]  Other chronic pain [G89.29]  Trochanteric bursitis, right hip [M70.61]  Pain in right hip [M25.551]  Low back pain, unspecified [M54.50]    SUBJECTIVE  Pain Level (0-10 scale): 7  Any medication changes, allergies to medications, adverse drug reactions, diagnosis change, or new procedure performed?: [x] No    [] Yes (see summary sheet for update)  Subjective functional status/changes:   [] No changes reported  'My pain is always a 7.\"  The pt reports limited overall change to date with PT.     OBJECTIVE       35 min Therapeutic Exercise:  [x]? See flow sheet :   Rationale: increase ROM and increase strength to improve the patients ability to perform ADL     15 min Neuromuscular Re-education:  [x]? See flow sheet :   Rationale: increase strength, improve coordination, improve balance and increase proprioception  to improve the patients ability to perform ADL        With   [] TE   [] TA   [] neuro   [] other: Patient Education: [x] Review HEP    [] Progressed/Changed HEP based on:   [] positioning   [] body mechanics   [] transfers   [] heat/ice application    [] other:      Other Objective/Functional Measures: increased reps with sit to stands and hip x3     Pain Level (0-10 scale) post treatment: 6    ASSESSMENT/Changes in Function:  The pt demonstrates limited overall change with PT to date with land based PT.  He did meet the predicted FOTO goal. He is interested in aquatic PT trial. He will be transferred to [facility] for reassessment and to determine if aquatics is appropriate for the patient at their location. Patient will continue to benefit from skilled PT services to modify and progress therapeutic interventions, address functional mobility deficits, address ROM deficits, address strength deficits, analyze and address soft tissue restrictions, analyze and cue movement patterns, assess and modify postural abnormalities and address imbalance/dizziness to attain remaining goals. []  See Plan of Care  []  See progress note/recertification  []  See Discharge Summary         Progress towards goals / Updated goals:  Short Term Goals: To be accomplished in 1 weeks:              5. The pt will be I and compliant with HEP              IE- issued HEP               SUJAFGN: TMVMTTV compliance 4-29-22  Long Term Goals: To be accomplished in 4 weeks:              1. Improve FOTO to the predicted outcome for improved ability for functional tasks.               IE- 36   Current: goal MET              2. The pt will demonstrate 4/5 B glute med MMT for improved ability for functional tasks              IE- 3-/5              Current: no change 5-3-22              3. The pt will report a little difficulty with walking 1 block.              IE- limited a lot              Current: No change, limited a lot. 4/22/2022              4. The pt will report at least 50% improvement to improve tolerance to daily activities. Sebas Ibarra              NI- worsening               Current: minimal progress 5-3-22    PLAN  []  Upgrade activities as tolerated     []  Continue plan of care  []  Update interventions per flow sheet       []  Discharge due to:_  [x]  Other:_ Pt transfer to [facility] for assessment and possible aquatic PT if appropriate     Jayshree Velasquez, PT 5/3/2022  12:38 PM    No future appointments.

## 2022-05-03 NOTE — PROGRESS NOTES
[facility]  [address] [facility] [address]  [place], [address]  [phone] fax    Physician Update      Patient name: Tiffanie Ritchie Start of Care: 2022   Referral source: Supa Preciado MD : 1950               Medical Diagnosis: Unilateral primary osteoarthritis, left knee [M17.12]  Pain in left knee [M25.562]  Other chronic pain [G89.29]  Trochanteric bursitis, right hip [M70.61]  Pain in right hip [M25.551]  Low back pain, unspecified [M54.50]  Payor: VA MEDICARE / Plan: VA MEDICARE PART A & B / Product Type: Medicare /  Onset Date:               Treatment Diagnosis: LBP, right hip pain, left knee pain, difficulty walking   Prior Hospitalization: see medical history Provider#: [id number]   Medications: Verified on Patient summary List    Comorbidities: OA, fibromyalgia, HTN, +tobacco use, right shoulder pain/dislocation, LBP   Prior Level of Function: functionally I with ADLs, cane/walking stick with gait                              Visits from Start of Care: 8    Missed Visits: 0       The pt demonstrates limited overall change with PT to date with land based PT. He did meet the predicted FOTO goal. He is interested in aquatic PT trial. At this time the pt will be transferred to [facility] for reassessment and to determine if aquatics is appropriate for the patient at their location. Progress towards goals:  Short Term Goals: To be accomplished in 1 weeks:              1. The pt will be I and compliant with HEP              IE- issued HEP               ZJFKXCX: FFAHFZF compliance 22  Long Term Goals: To be accomplished in 4 weeks:              1. Improve FOTO to the predicted outcome for improved ability for functional tasks.               IE- 36   Current: goal MET              2.  The pt will demonstrate 4/5 B glute med MMT for improved ability for functional tasks              IE- 3-/5              Current: no change 5-3-22              3. The pt will report a little difficulty with walking 1 block.              IE- limited a lot              Current: No change, limited a lot. 4/22/2022              4. The pt will report at least 50% improvement to improve tolerance to daily activities.                       CF- worsening               Current: minimal progress 5-3-22        ASSESSMENT/RECOMMENDATIONS:  []Continue therapy per initial plan/protocol at a frequency of  [x]Continue therapy with the following recommended changes:_Transfer to [place] as above  []Discontinue therapy progressing towards or have reached established goals  []Discontinue therapy due to lack of appreciable progress towards goals  []Discontinue therapy due to lack of attendance or compliance  []Await Physician's recommendations/decisions regarding therapy  []Other:________________________________________________________________    Thank you for this referral.   Jessica Stewart, PT 5/3/2022 12:50 PM  NOTE TO PHYSICIAN:  [address] TO [facility]: [phone]  If you are unable to process this request in 24 hours please contact our office: [phone] I have read the above report and request that my patient continue as recommended. ? I have read the above report and request that my patient continue therapy with the following changes/special instructions:___________________________________________________________  ? I have read the above report and request that my patient be discharged from therapy.     [de-identified] Signature:____________Date:_________TIME:________     Michael Medina MD  ** Signature, Date and Time must be completed for valid certification **

## 2022-05-27 ENCOUNTER — HOSPITAL ENCOUNTER (OUTPATIENT)
Dept: PHYSICAL THERAPY | Age: 72
Discharge: HOME | End: 2022-05-27
Attending: SPECIALIST
Payer: MEDICARE

## 2022-05-27 PROCEDURE — 97530 THERAPEUTIC ACTIVITIES: CPT

## 2022-05-27 NOTE — PROGRESS NOTES
PT DAILY TREATMENT NOTE     Patient Name: Reji Rose  Date:2022  : 1950  [x]  Patient  Verified  Payor: VA MEDICARE / Plan: VA MEDICARE PART A & B / Product Type: Medicare /    In time: 10:35  Out time:11:16  Total Treatment Time (min): 41  Visit #: 1 of 10    Medicare/BCBS Only   Total Timed Codes (min):  41 1:1 Treatment Time:  41       Treatment Area: Unilateral primary osteoarthritis, left knee [M17.12]  Pain in left knee [M25.562]  Other chronic pain [G89.29]  Trochanteric bursitis, right hip [M70.61]  Pain in right hip [M25.551]  Low back pain, unspecified [M54.50]    SUBJECTIVE  Pain Level (0-10 scale): 6 knees and LBP  Any medication changes, allergies to medications, adverse drug reactions, diagnosis change, or new procedure performed?: [x] No    [] Yes (see summary sheet for update)  Subjective functional status/changes:   [] No changes reported  Continues to have pain and impaired gait/balance. OBJECTIVE    41 min Therapeutic Activity:  [x]  See flow sheet : goal assessment, pt education on how aquatic therapy can help with pain and mobility, pt education and showing the pt the therapy pool   Rationale: increase ROM, increase strength, improve coordination and increase proprioception  to improve the patients ability to tolerate ADLs          With   [] TE   [] TA   [] neuro   [] other: Patient Education: [x] Review HEP    [] Progressed/Changed HEP based on:   [] positioning   [] body mechanics   [] transfers   [] heat/ice application    [] other:      Other Objective/Functional Measures: see goals     Pain Level (0-10 scale) post treatment: 6    ASSESSMENT/Changes in Function: See re-certification. Pt returns to therapy after being transferred from our [facility] location for aquatic therapy. He continues to have increased LBP and B hip/knee pain along with impaired gait and balance.  His right LE tends to drag with gait and has difficulty with maintaining erect posture in standing. Pt states he is only able to attend aquatic therapy 1x a week because of his schedule and will plan on doing land 1x a week as well. We will continue therapy to improve his deficits and safety with mobility. Patient will continue to benefit from skilled PT services to modify and progress therapeutic interventions, address functional mobility deficits, address ROM deficits, address strength deficits, analyze and address soft tissue restrictions, analyze and cue movement patterns, analyze and modify body mechanics/ergonomics, assess and modify postural abnormalities, address imbalance/dizziness and instruct in home and community integration to attain remaining goals. []  See Plan of Care  []  See progress note/recertification  []  See Discharge Summary         Progress towards goals / Updated goals:  Short Term Goals: To be accomplished in 1 weeks:              1. The pt will be I and compliant with HEP              IE- issued HEP               Current: continued limited compliance  Long Term Goals: To be accomplished in 4 weeks:              1. Improve FOTO to the predicted outcome for improved ability for functional tasks.               IE- 36              Current: goal MET              2. The pt will demonstrate 4/5 B glute med MMT for improved ability for functional tasks              IE- 3-/5              Current: no change, continued weakness in the B LEs              3. The pt will report a little difficulty with walking 1 block.              IE- limited a lot              Current: not met, increased difficulty with walking to the therapy pool.               4. The pt will report at least 50% improvement to improve tolerance to daily activities.                       EV- worsening               Current: not met, continues to have limited activity tolerance and mobility.      PLAN  [x]  Upgrade activities as tolerated     [x]  Continue plan of care  [x]  Update interventions per flow sheet []  Discharge due to:_  []  Other:_      Nuha Abraham, PT 5/27/2022  10:35 AM    Future Appointments   Date Time Provider Shilo Anderson   6/7/2022  2:30 PM POOL RESOURCE [facility]   6/9/2022 11:15 AM Nestor Miller, [facility]   6/14/2022  2:30 PM POOL RESOURCE [facility]   6/16/2022 11:15 AM Casa Monet, [facility]   6/21/2022  2:30 PM POOL RESOURCE [facility]   6/23/2022 11:15 AM John Holloway [facility]   6/28/2022  2:00 PM POOL RESOURCE [facility]   6/30/2022 11:15 AM Errol, [address]

## 2022-05-27 NOTE — PROGRESS NOTES
[facility] - [place] [address] [place] [address]  [phone] fax    Continued Plan of Care/ Re-certification for Physical Therapy Services                       Patient name: Maxwell Meier Start of Care: 2022   Referral source: Jamel Butler MD : 1950               Medical Diagnosis: Unilateral primary osteoarthritis, left knee [M17.12]  Pain in left knee [M25.562]  Other chronic pain [G89.29]  Trochanteric bursitis, right hip [M70.61]  Pain in right hip [M25.551]  Low back pain, unspecified [M54.50]  Payor: VA MEDICARE / Plan: VA MEDICARE PART A & B / Product Type: Medicare /  Onset Date:               Treatment Diagnosis: LBP, right hip pain, left knee pain, difficulty walking   Prior Hospitalization: see medical history Provider#: [id number]   Medications: Verified on Patient summary List    Comorbidities: OA, fibromyalgia, HTN, +tobacco use, right shoulder pain/dislocation, LBP   Prior Level of Function: functionally I with ADLs, cane/walking stick with gait  Visits from Start of Care: 9    Missed Visits: 0    The Plan of Care and following information is based on the patient's current status:  Goal: The pt will be I and compliant with HEP  Status at last note/certification: not met, continued limited compliance  Current Status: not met    Goal: Improve FOTO to the predicted outcome for improved ability for functional tasks. Status at last note/certification: goal MET  Current Status: met    Goal: The pt will demonstrate 4/5 B glute med MMT for improved ability for functional tasks  Status at last note/certification: no change, continued weakness in the B LEs  Current Status: not met    Goal: The pt will report a little difficulty with walking 1 block. Status at last note/certification: not met, increased difficulty with walking to the therapy pool.    Current Status: not met    Goal: The pt will report at least 50% improvement to improve tolerance to daily activities. Status at last note/certification: not met, continues to have limited activity tolerance and mobility. Current Status: not met    Key functional changes:   No significant changes secondary to being transferred to the [facility] for aquatic therapy. Problems/ barriers to goal attainment: none     Problem List: pain affecting function, decrease ROM, decrease strength, impaired gait/ balance, decrease ADL/ functional abilitiies, decrease activity tolerance, decrease flexibility/ joint mobility and decrease transfer abilities    Treatment Plan: Therapeutic exercise, Therapeutic activities, Neuromuscular re-education, Physical agent/modality, Gait/balance training, Manual therapy, Aquatic therapy, Patient education, Self Care training, Functional mobility training, Home safety training and Stair training     Patient Goal (s) has been updated and includes: \"be able to stand up taller\"     Goals for this certification period to be accomplished in 10 treatments:  1. Pt will tolerate a full aquatic therapy session with no increased pain/symptoms to improve ease of mobility. Re-certification: will establish in upcoming aquatic sessions  2. Pt will be able to ambulate to or from the therapy pool with no rest breaks to improve activity tolerance. Re-certification: needed several rest breaks because of pain and fatigue  3. Pt will report at least 50% improvement to improve endurance needed for ADLs. Re-certification: continues to have limited activity tolerance and mobility. Frequency / Duration: Patient to be seen 2 times per week for 10 treatments:    Assessment / Recommendations:  Pt returns to therapy after being transferred from our [facility] location for aquatic therapy. He continues to have increased LBP and B hip/knee pain along with impaired gait and balance.  His right LE tends to drag with gait and has difficulty with maintaining erect posture in standing. Pt states he is only able to attend aquatic therapy 1x a week because of his schedule and will plan on doing land 1x a week as well. We will continue therapy to improve his deficits and safety with mobility. Certification Period: 5/27/2022-6/25/2022    Tomy Grisel, PT 5/27/2022 12:24 PM    ________________________________________________________________________  I certify that the above Therapy Services are being furnished while the patient is under my care. I agree with the treatment plan and certify that this therapy is necessary. [] I have read the above and request that my patient continue as recommended.   [] I have read the above report and request that my patient continue therapy with the following changes/special instructions: ______________________________________  [] I have read the above report and request that my patient be discharged from therapy    Physician's Signature:____________Date:_________TIME:________     Noni Garcias MD  ** Signature, Date and Time must be completed for valid certification **    Please sign and return to [facility] - [address]  [phone] fax

## 2022-06-10 NOTE — PROGRESS NOTES
[facility] - [address]  [address] [facility], [address]  [phone] fax    Discharge Summary                            Patient name: Maxwell Callejas Start of Care: 2022   Referral source: Damaris Butler MD : 1950               Medical Diagnosis: Unilateral primary osteoarthritis, left knee [M17.12]  Pain in left knee [M25.562]  Other chronic pain [G89.29]  Trochanteric bursitis, right hip [M70.61]  Pain in right hip [M25.551]  Low back pain, unspecified [M54.50]  Payor: VA MEDICARE / Plan: VA MEDICARE PART A & B / Product Type: Medicare /  Onset Date:               Treatment Diagnosis: LBP, right hip pain, left knee pain, difficulty walking   Prior Hospitalization: see medical history Provider#: [id number]   Medications: Verified on Patient summary List    Comorbidities: OA, fibromyalgia, HTN, +tobacco use, right shoulder pain/dislocation, LBP   Prior Level of Function: functionally I with ADLs, cane/walking stick with gait  Visits from Start of Care: 9    Missed Visits: 0  Reporting Period : 2022 to 2022    Goal: Pt will tolerate a full aquatic therapy session with no increased pain/symptoms to improve ease of mobility. Status at last note/certification: unable to reassess  Status at discharge: not met    Goal: Pt will be able to ambulate to or from the therapy pool with no rest breaks to improve activity tolerance. Status at last note/certification: unable to reassess  Status at discharge: not met    Goal: Pt will report at least 50% improvement to improve endurance needed for ADLs. Status at last note/certification: unable to reassess  Status at discharge: not met    Assessment/Summary of care:   Pt was seen for 9 therapy sessions. The pt's last therapy appointment was on 2022. Pt was transferred to our [place] location for aquatic therapy.  Per pt's chart, pt requested d/c secondary to the walk from the clinic to the pool being too far. Pt is therefore d/c'ed from therapy and unable to reassess goals above.      RECOMMENDATIONS:  [x]Discontinue therapy: []Patient has reached or is progressing toward set goals      [x]Patient is non-compliant or has abdicated      []Due to lack of appreciable progress towards set goals    Dulce Yu, PT 6/10/2022 10:58 AM

## (undated) DEVICE — NEEDLE EPI 18GA L3.5IN CLR HUB TUOHY W/ WNG PERIFIX

## (undated) DEVICE — SET EPI 18GA L3.5IN TUOHY NDL W/ 20GA CLS TIP NYL CATH

## (undated) DEVICE — (D)BNDG ADHESIVE FABRIC 3/4X3 -- DISC BY MFR USE ITEM 357960

## (undated) DEVICE — Device: Brand: MEDEX

## (undated) DEVICE — (D)SYR 10ML 1/5ML GRAD NSAF -- PKGING CHANGE USE ITEM 338027